# Patient Record
Sex: MALE | Race: WHITE | NOT HISPANIC OR LATINO | Employment: OTHER | URBAN - METROPOLITAN AREA
[De-identification: names, ages, dates, MRNs, and addresses within clinical notes are randomized per-mention and may not be internally consistent; named-entity substitution may affect disease eponyms.]

---

## 2018-05-18 LAB — CARCINOEMBRYONIC ANTIGEN (HISTORICAL): 3.2

## 2018-06-18 LAB
ABSOL LYMPHOCYTES (HISTORICAL): 0.7 K/UL (ref 0.5–4)
ANION GAP SERPL CALCULATED.3IONS-SCNC: 9 MMOL/L (ref 5–14)
BASOPHILS # BLD AUTO: 0 K/UL (ref 0–0.1)
BASOPHILS # BLD AUTO: 1 % (ref 0–1)
BUN SERPL-MCNC: 20 MG/DL (ref 5–25)
CALCIUM SERPL-MCNC: 8.6 MG/DL (ref 8.4–10.2)
CHLORIDE SERPL-SCNC: 101 MEQ/L (ref 97–108)
CO2 SERPL-SCNC: 27 MMOL/L (ref 22–30)
CREATINE, SERUM (HISTORICAL): 1 MG/DL (ref 0.7–1.5)
DEPRECATED RDW RBC AUTO: 12.9 %
EGFR (HISTORICAL): >60 ML/MIN/1.73 M2
EOSINOPHIL # BLD AUTO: 0.2 K/UL (ref 0–0.4)
EOSINOPHIL NFR BLD AUTO: 3 % (ref 0–6)
GLUCOSE SERPL-MCNC: 109 MG/DL (ref 70–99)
HCT VFR BLD AUTO: 40.6 % (ref 41–53)
HGB BLD-MCNC: 13.6 G/DL (ref 13.5–17.5)
LYMPHOCYTES NFR BLD AUTO: 11 % (ref 25–45)
MCH RBC QN AUTO: 32.5 PG (ref 26–34)
MCHC RBC AUTO-ENTMCNC: 33.4 % (ref 31–36)
MCV RBC AUTO: 97 FL (ref 80–100)
MONOCYTES # BLD AUTO: 0.8 K/UL (ref 0.2–0.9)
MONOCYTES NFR BLD AUTO: 12 % (ref 1–10)
NEUTROPHILS ABS COUNT (HISTORICAL): 4.8 K/UL (ref 1.8–7.8)
NEUTS SEG NFR BLD AUTO: 73 % (ref 45–65)
PLATELET # BLD AUTO: 182 K/MCL (ref 150–450)
POTASSIUM SERPL-SCNC: 4 MEQ/L (ref 3.6–5)
RBC # BLD AUTO: 4.18 M/MCL (ref 4.5–5.9)
SODIUM SERPL-SCNC: 137 MEQ/L (ref 137–147)
WBC # BLD AUTO: 6.5 K/MCL (ref 4.5–11)

## 2018-08-24 ENCOUNTER — TELEPHONE (OUTPATIENT)
Dept: FAMILY MEDICINE CLINIC | Facility: CLINIC | Age: 83
End: 2018-08-24

## 2018-08-24 NOTE — TELEPHONE ENCOUNTER
Patient called he would like to know if he had the T dap done he can be reached at [facility]

## 2018-08-26 ENCOUNTER — APPOINTMENT (EMERGENCY)
Dept: CT IMAGING | Facility: HOSPITAL | Age: 83
End: 2018-08-26
Payer: MEDICARE

## 2018-08-26 ENCOUNTER — OFFICE VISIT (OUTPATIENT)
Dept: URGENT CARE | Age: 83
End: 2018-08-26
Payer: MEDICARE

## 2018-08-26 ENCOUNTER — HOSPITAL ENCOUNTER (EMERGENCY)
Facility: HOSPITAL | Age: 83
Discharge: HOME | End: 2018-08-26
Attending: EMERGENCY MEDICINE | Admitting: EMERGENCY MEDICINE
Payer: MEDICARE

## 2018-08-26 VITALS
SYSTOLIC BLOOD PRESSURE: 190 MMHG | OXYGEN SATURATION: 97 % | DIASTOLIC BLOOD PRESSURE: 90 MMHG | TEMPERATURE: 97.9 F | WEIGHT: 167 LBS | HEIGHT: 70 IN | HEART RATE: 60 BPM | BODY MASS INDEX: 23.91 KG/M2 | RESPIRATION RATE: 16 BRPM

## 2018-08-26 VITALS
BODY MASS INDEX: 23.82 KG/M2 | WEIGHT: 166 LBS | DIASTOLIC BLOOD PRESSURE: 103 MMHG | HEART RATE: 53 BPM | TEMPERATURE: 98.1 F | RESPIRATION RATE: 18 BRPM | SYSTOLIC BLOOD PRESSURE: 195 MMHG | OXYGEN SATURATION: 98 %

## 2018-08-26 DIAGNOSIS — I16.0 HYPERTENSIVE URGENCY: Primary | ICD-10-CM

## 2018-08-26 DIAGNOSIS — I10 HYPERTENSION, UNSPECIFIED TYPE: ICD-10-CM

## 2018-08-26 LAB
ALBUMIN SERPL BCP-MCNC: 4 G/DL (ref 3–5.2)
ALP SERPL-CCNC: 90 U/L (ref 43–122)
ALT SERPL W P-5'-P-CCNC: 42 U/L (ref 9–52)
ANION GAP SERPL CALCULATED.3IONS-SCNC: 8 MMOL/L (ref 5–14)
APTT PPP: 26 SECONDS (ref 23–34)
AST SERPL W P-5'-P-CCNC: 35 U/L (ref 17–59)
BILIRUB SERPL-MCNC: 0.6 MG/DL
BILIRUB UR QL STRIP: NEGATIVE
BUN SERPL-MCNC: 21 MG/DL (ref 5–25)
CALCIUM SERPL-MCNC: 8.8 MG/DL (ref 8.4–10.2)
CHLORIDE SERPL-SCNC: 102 MMOL/L (ref 97–108)
CLARITY UR: CLEAR
CO2 SERPL-SCNC: 29 MMOL/L (ref 22–30)
COLOR UR: NORMAL
CREAT SERPL-MCNC: 0.88 MG/DL (ref 0.7–1.5)
EOSINOPHIL # BLD AUTO: 0.42 THOUSAND/UL (ref 0–0.4)
EOSINOPHIL NFR BLD MANUAL: 7 % (ref 0–6)
ERYTHROCYTE [DISTWIDTH] IN BLOOD BY AUTOMATED COUNT: 12.9 %
GFR SERPL CREATININE-BSD FRML MDRD: 78 ML/MIN/1.73SQ M
GLUCOSE SERPL-MCNC: 96 MG/DL (ref 70–99)
GLUCOSE UR STRIP-MCNC: NEGATIVE MG/DL
HCT VFR BLD AUTO: 40.8 % (ref 41–53)
HGB BLD-MCNC: 13.9 G/DL (ref 13.5–17.5)
HGB UR QL STRIP.AUTO: NEGATIVE
INR PPP: 1.06 (ref 0.89–1.1)
KETONES UR STRIP-MCNC: NEGATIVE MG/DL
LEUKOCYTE ESTERASE UR QL STRIP: NEGATIVE
LYMPHOCYTES # BLD AUTO: 1.14 THOUSAND/UL (ref 0.5–4)
LYMPHOCYTES # BLD AUTO: 19 % (ref 20–50)
MCH RBC QN AUTO: 32.6 PG (ref 26.8–34.3)
MCHC RBC AUTO-ENTMCNC: 34.2 G/DL (ref 31.4–37.4)
MCV RBC AUTO: 95 FL (ref 80–100)
MONOCYTES # BLD AUTO: 0.6 THOUSAND/UL (ref 0.2–0.9)
MONOCYTES NFR BLD AUTO: 10 % (ref 1–10)
NEUTS BAND NFR BLD MANUAL: 3 % (ref 0–8)
NEUTS SEG # BLD: 3.84 THOUSAND/UL (ref 1.8–7.8)
NEUTS SEG NFR BLD AUTO: 61 %
NITRITE UR QL STRIP: NEGATIVE
PH UR STRIP.AUTO: 5 [PH] (ref 4.5–8)
PLATELET # BLD AUTO: 178 THOUSANDS/UL (ref 150–450)
PLATELET BLD QL SMEAR: ADEQUATE
PMV BLD AUTO: 8.1 FL (ref 8.9–12.7)
POTASSIUM SERPL-SCNC: 4.2 MMOL/L (ref 3.6–5)
PROT SERPL-MCNC: 6.9 G/DL (ref 5.9–8.4)
PROT UR STRIP-MCNC: NEGATIVE MG/DL
PROTHROMBIN TIME: 11.2 SECONDS (ref 9.5–11.6)
RBC # BLD AUTO: 4.28 MILLION/UL (ref 4.5–5.9)
RBC MORPH BLD: NORMAL
SODIUM SERPL-SCNC: 139 MMOL/L (ref 137–147)
SP GR UR STRIP.AUTO: 1.01 (ref 1–1.04)
TOTAL CELLS COUNTED SPEC: 100
TROPONIN I SERPL-MCNC: <0.01 NG/ML (ref 0–0.03)
UROBILINOGEN UA: NEGATIVE MG/DL
WBC # BLD AUTO: 6 THOUSAND/UL (ref 4.31–10.16)

## 2018-08-26 PROCEDURE — 80053 COMPREHEN METABOLIC PANEL: CPT | Performed by: EMERGENCY MEDICINE

## 2018-08-26 PROCEDURE — 85610 PROTHROMBIN TIME: CPT | Performed by: EMERGENCY MEDICINE

## 2018-08-26 PROCEDURE — 85027 COMPLETE CBC AUTOMATED: CPT | Performed by: EMERGENCY MEDICINE

## 2018-08-26 PROCEDURE — 93005 ELECTROCARDIOGRAM TRACING: CPT

## 2018-08-26 PROCEDURE — 99213 OFFICE O/P EST LOW 20 MIN: CPT | Performed by: PHYSICIAN ASSISTANT

## 2018-08-26 PROCEDURE — 36415 COLL VENOUS BLD VENIPUNCTURE: CPT | Performed by: EMERGENCY MEDICINE

## 2018-08-26 PROCEDURE — 85730 THROMBOPLASTIN TIME PARTIAL: CPT | Performed by: EMERGENCY MEDICINE

## 2018-08-26 PROCEDURE — 70450 CT HEAD/BRAIN W/O DYE: CPT

## 2018-08-26 PROCEDURE — 84484 ASSAY OF TROPONIN QUANT: CPT | Performed by: EMERGENCY MEDICINE

## 2018-08-26 PROCEDURE — 85007 BL SMEAR W/DIFF WBC COUNT: CPT | Performed by: EMERGENCY MEDICINE

## 2018-08-26 PROCEDURE — 99284 EMERGENCY DEPT VISIT MOD MDM: CPT

## 2018-08-26 PROCEDURE — G0463 HOSPITAL OUTPT CLINIC VISIT: HCPCS | Performed by: PHYSICIAN ASSISTANT

## 2018-08-26 RX ORDER — METOPROLOL TARTRATE 50 MG/1
TABLET, FILM COATED ORAL
Status: COMPLETED
Start: 2018-08-26 | End: 2018-08-26

## 2018-08-26 RX ORDER — SIMVASTATIN 20 MG
20 TABLET ORAL
COMMUNITY
End: 2019-01-16 | Stop reason: SDUPTHER

## 2018-08-26 RX ORDER — METOPROLOL TARTRATE 50 MG/1
50 TABLET, FILM COATED ORAL ONCE
Status: COMPLETED | OUTPATIENT
Start: 2018-08-26 | End: 2018-08-26

## 2018-08-26 RX ORDER — OMEPRAZOLE 20 MG/1
20 CAPSULE, DELAYED RELEASE ORAL DAILY
COMMUNITY
End: 2021-08-25

## 2018-08-26 RX ORDER — METOPROLOL TARTRATE 50 MG/1
25 TABLET, FILM COATED ORAL EVERY 12 HOURS SCHEDULED
COMMUNITY
End: 2019-06-21 | Stop reason: SDUPTHER

## 2018-08-26 RX ORDER — UREA 10 %
400 LOTION (ML) TOPICAL DAILY
COMMUNITY

## 2018-08-26 RX ORDER — LISINOPRIL AND HYDROCHLOROTHIAZIDE 20; 12.5 MG/1; MG/1
1 TABLET ORAL DAILY
COMMUNITY
End: 2018-10-25 | Stop reason: HOSPADM

## 2018-08-26 RX ORDER — ASPIRIN 81 MG/1
81 TABLET ORAL DAILY
COMMUNITY
End: 2018-08-27

## 2018-08-26 RX ADMIN — METOPROLOL TARTRATE 50 MG: 50 TABLET ORAL at 18:00

## 2018-08-26 RX ADMIN — METOPROLOL TARTRATE 50 MG: 50 TABLET, FILM COATED ORAL at 18:00

## 2018-08-26 NOTE — PROGRESS NOTES
[facility]        NAME: Ron Deleon is a 80 y o  male  : 1932    MRN: [id number]  DATE: 2018  TIME: 3:35 PM    Assessment and Plan   Hypertensive urgency [I16 0]  1  Hypertensive urgency  Transfer to other facility         Patient Instructions       Follow up with PCP in 3-5 days  Proceed to  ER if symptoms worsen  Chief Complaint     Chief Complaint   Patient presents with    Hypertension     felt ill at 0200; took bp at home over 200;  feels a little better but bp still high         History of Present Illness       80year-old male presents with high blood pressure problems  Patient reports this morning around 1 or 2 o'clock in the morning he was awoke in and was not feeling that well at time  Went to use the restroom like he usually does and felt like he was off and having trouble with his balance  Patient thought that he could just sleep it off so he went back to bed and woke up this morning still feeling off thinking was kind of foggy and mild headache  Took his blood pressure realize it was the 200s/100s  He took his morning medications and then was tracking it through the course of the day  Blood pressures have not got below 170 is  Patient presents in the clinic still feeling very foggy the type of thought process and a mild headache  No blurry vision double vision  No ringing of the ears or decreased hearing  No nausea vomiting  No dizziness  No chest pain shortness of breath  Hypertension   This is a chronic problem  The current episode started today  The problem has been waxing and waning since onset  The problem is uncontrolled  Associated symptoms include headaches  (Having trouble with his thoughts  Feels very foggy  ) There are no associated agents to hypertension  There are no known risk factors for coronary artery disease  Past treatments include ACE inhibitors and beta blockers  The current treatment provides mild improvement   There are no compliance problems  Review of Systems   Review of Systems   Constitutional: Negative  HENT: Negative  Eyes: Negative  Respiratory: Negative  Cardiovascular: Negative  Gastrointestinal: Negative  Genitourinary: Negative  Musculoskeletal: Negative  Skin: Negative  Neurological: Positive for headaches  Current Medications       Current Outpatient Prescriptions:     aspirin (ECOTRIN LOW STRENGTH) 81 mg EC tablet, Take 81 mg by mouth daily, Disp: , Rfl:     folic acid (FOLVITE) 462 MCG tablet, Take 400 mcg by mouth daily, Disp: , Rfl:     lisinopril-hydrochlorothiazide (PRINZIDE,ZESTORETIC) 20-12 5 MG per tablet, Take 1 tablet by mouth daily, Disp: , Rfl:     metoprolol tartrate (LOPRESSOR) 50 mg tablet, Take 25 mg by mouth every 12 (twelve) hours, Disp: , Rfl:     omeprazole (PriLOSEC) 20 mg delayed release capsule, Take 20 mg by mouth daily, Disp: , Rfl:     simvastatin (ZOCOR) 20 mg tablet, Take 20 mg by mouth daily at bedtime, Disp: , Rfl:     Current Allergies     Allergies as of 08/26/2018    (No Known Allergies)            The following portions of the patient's history were reviewed and updated as appropriate: allergies, current medications, past family history, past medical history, past social history, past surgical history and problem list      Past Medical History:   Diagnosis Date    Anemia, iron deficiency     Drug Therapy, poor absorbtion documented    Appendicitis     Colon cancer ([facility] [address] ) 12/2013    Resected: BREANNA    Congenital nystagmus     Left    Hypertension     Inguinal hernia     Sciatica 2010    resolved from problem list-2011       Past Surgical History:   Procedure Laterality Date    APPENDECTOMY      Appendicitis    COLON SIGMOID RESECTION  12/2013    INGUINAL HERNIA REPAIR      SHOULDER ARTHROCENTESIS Right     sub-acromial bursa area       No family history on file  Medications have been verified          Objective   BP (!) 190/90 Comment: manual left arm  Pulse 60   Temp 97 9 °F (36 6 °C)   Resp 16   Ht 5' 10" (1 778 m)   Wt 75 8 kg (167 lb)   SpO2 97%   BMI 23 96 kg/m²        Physical Exam     Physical Exam   Constitutional: He is oriented to person, place, and time  He appears well-developed and well-nourished  No distress  HENT:   Head: Normocephalic and atraumatic  Right Ear: External ear normal    Left Ear: External ear normal    Nose: Nose normal    Mouth/Throat: Oropharynx is clear and moist  No oropharyngeal exudate  Eyes: Conjunctivae are normal  Right eye exhibits no discharge  Left eye exhibits no discharge  Neck: Normal range of motion  Neck supple  Cardiovascular: Normal rate, regular rhythm, normal heart sounds and intact distal pulses  No murmur heard  Pulmonary/Chest: Effort normal and breath sounds normal  No respiratory distress  He has no wheezes  He has no rales  Abdominal: Soft  Bowel sounds are normal  There is no tenderness  Musculoskeletal: Normal range of motion  Lymphadenopathy:     He has no cervical adenopathy  Neurological: He is alert and oriented to person, place, and time  He has normal reflexes  He displays normal reflexes  No cranial nerve deficit  He exhibits normal muscle tone  Coordination normal    Skin: Skin is warm and dry  Psychiatric: He has a normal mood and affect  Nursing note and vitals reviewed

## 2018-08-26 NOTE — PATIENT INSTRUCTIONS
Go straight to the ER for evaluation    Hypertensive Crisis   WHAT YOU NEED TO KNOW:   A hypertensive crisis is a sudden spike in blood pressure to 180/120 or higher  It is also known as acute hypertension  A hypertensive crisis is a medical emergency  It could lead to organ damage or be life-threatening  DISCHARGE INSTRUCTIONS:   Medicines: The following medicines may be ordered for you:  · Blood pressure medicine  is given to lower your blood pressure  There are many different types of blood pressure medicine, and you may need more than one type  · Diuretics  help decrease extra fluid that collects in your blood vessels  This lowers your blood pressure by reducing pressure in your arteries  Diuretics are often called water pills  You may urinate more often while you take this medicine  · Take your medicine as directed  Contact your healthcare provider if you think your medicine is not helping or if you have side effects  Tell him of her if you are allergic to any medicine  Keep a list of the medicines, vitamins, and herbs you take  Include the amounts, and when and why you take them  Bring the list or the pill bottles to follow-up visits  Carry your medicine list with you in case of an emergency  Follow up with your healthcare provider within 1 to 5 days or as directed: You will need to return to have your blood pressure checked and other tests  Your healthcare provider may also refer to you a cardiologist  Write down your questions so you remember to ask them during your visits  Monitor your blood pressure at home:  Take your blood pressure while in a seated position  Take it at least twice a day, such as morning and evening  Keep a log of your blood pressure readings and bring it to your follow-up visits  Help prevent another hypertensive crisis:   · Manage other health conditions  such as diabetes, thyroid disease, or adrenal problems  These conditions can cause or worsen a hypertensive crisis  · Eat a variety of healthy foods  including fruits, vegetables, whole-grain breads, low-fat dairy products, beans, lean meats, and fish  You will need to limit how much sodium and fat you eat  You also may need to eat more potassium  Ask if you need to be on a special diet  Changes to your diet will help lower your blood pressure  · Ask if you are at a healthy weight  Ask your healthcare provider to help you create a weight loss plan if you are overweight  Even a little weight loss can help lower your blood pressure  · Ask your healthcare provider about the best exercise plan for you  Exercise may help lower your blood pressure  · Limit alcohol  to 1 drink a day if you are a woman  A man should limit alcohol to 2 drinks a day  A drink of alcohol is 12 ounces of beer, 5 ounces of wine, or 1½ ounces of liquor  · Do not smoke  Smoking causes heart disease and may also raise your blood pressure  If you smoke, it is never too late to quit  Ask your healthcare provider for information if you need help to stop smoking  Contact your healthcare provider if:   · You run out of medicine  · You have questions or concerns about your condition or care  Return to the emergency department if:   · You take your blood pressure and it is 180/110 or higher  · You have a severe headache  · You have chest pain or shortness of breath  · You have weakness or numbness in your face, arms, or legs  · You cannot see or talk as well as usual   © 2017 [address] is for End User's use only and may not be sold, redistributed or otherwise used for commercial purposes  All illustrations and images included in CareNotes® are the copyrighted property of [employer]  or [employer]  The above information is an  only  It is not intended as medical advice for individual conditions or treatments   Talk to your doctor, nurse or pharmacist before following any medical regimen to see if it is safe and effective for you

## 2018-08-26 NOTE — ED PROVIDER NOTES
History  Chief Complaint   Patient presents with    High Blood Pressure     "I woke up this morning I was dizzy so I thought my BP was high and it was  On my machine at home, it came up 237/115  I did take my meds today so I don't know why they're not working all of the sudden"  Seen at [facility] and sent her for eval     At 2 in the morning as he ambulated to go to the bathroom, he felt very lightheaded  Denies any visual disturbances nausea vomiting headaches  States he took his blood pressure was markedly elevated systolic was however but 816 diastolic about 399  He is compliant with his antihypertensive medications  States he has not had any recent changes in his antihypertensive medications  States visit cardiologist with initially started him on his antihypertensive medications  But he is followed regular by his cardiologist and his family practice doctor  At this time he is asymptomatic  He also recorded his blood pressure is an 88 come down to systolic about 705 diastolic roughly between   History provided by:  Patient (Daughter at the bedside who lives in [place])   used: No    Dizziness   Quality:  Lightheadedness  Severity:  Moderate      Prior to Admission Medications   Prescriptions Last Dose Informant Patient Reported?  Taking?   folic acid (FOLVITE) 530 MCG tablet   Yes Yes   Sig: Take 400 mcg by mouth daily   lisinopril-hydrochlorothiazide (PRINZIDE,ZESTORETIC) 20-12 5 MG per tablet   Yes Yes   Sig: Take 1 tablet by mouth daily   metoprolol tartrate (LOPRESSOR) 50 mg tablet   Yes Yes   Sig: Take 25 mg by mouth every 12 (twelve) hours   omeprazole (PriLOSEC) 20 mg delayed release capsule   Yes Yes   Sig: Take 20 mg by mouth daily   simvastatin (ZOCOR) 20 mg tablet   Yes Yes   Sig: Take 20 mg by mouth daily at bedtime      Facility-Administered Medications: None       Past Medical History:   Diagnosis Date    Anemia, iron deficiency     Drug Therapy, poor absorbtion documented    Appendicitis     Colon cancer ([facility] [address] ) 12/2013    Resected: BREANNA    Congenital nystagmus     Left    Hypertension     Inguinal hernia     Sciatica 2010    resolved from problem list-2011       Past Surgical History:   Procedure Laterality Date    APPENDECTOMY      Appendicitis    COLON SIGMOID RESECTION  12/2013    INGUINAL HERNIA REPAIR      SHOULDER ARTHROCENTESIS Right     sub-acromial bursa area       History reviewed  No pertinent family history  I have reviewed and agree with the history as documented  Social History   Substance Use Topics    Smoking status: Never Smoker    Smokeless tobacco: Never Used    Alcohol use Not on file        Review of Systems   Constitutional: Negative  HENT: Negative  Eyes: Negative  Respiratory: Negative  Cardiovascular: Negative  Gastrointestinal: Negative  Endocrine: Negative  Genitourinary: Negative  Musculoskeletal: Negative  Skin: Negative  Allergic/Immunologic: Negative  Neurological: Positive for dizziness  Hematological: Negative  Psychiatric/Behavioral: Negative  All other systems reviewed and are negative  Physical Exam  Physical Exam   Constitutional: He is oriented to person, place, and time  He appears well-developed and well-nourished  No distress  Smiling very pleasant individual resting very comfortably on the stretcher laughing very easily   HENT:   Head: Normocephalic and atraumatic  Right Ear: External ear normal    Left Ear: External ear normal    Nose: Nose normal    Mouth/Throat: Oropharynx is clear and moist    Eyes: Conjunctivae and EOM are normal  Pupils are equal, round, and reactive to light  Neck: Normal range of motion  Neck supple  No JVD present  No tracheal deviation present  No thyromegaly present  Cardiovascular: Normal rate, regular rhythm, normal heart sounds and intact distal pulses  Exam reveals no gallop and no friction rub      No murmur heard   Pulmonary/Chest: Effort normal and breath sounds normal  No stridor  No respiratory distress  He has no wheezes  He has no rales  He exhibits no tenderness  Abdominal: Soft  Bowel sounds are normal  He exhibits no distension and no mass  There is no tenderness  There is no rebound and no guarding  No hernia  Genitourinary: Rectal exam shows guaiac negative stool  Musculoskeletal: Normal range of motion  He exhibits no edema, tenderness or deformity  Lymphadenopathy:     He has no cervical adenopathy  Neurological: He is alert and oriented to person, place, and time  He displays normal reflexes  No cranial nerve deficit or sensory deficit  He exhibits normal muscle tone  Coordination normal    Skin: Skin is warm and dry  Capillary refill takes less than 2 seconds  No rash noted  He is not diaphoretic  No erythema  No pallor  Psychiatric: He has a normal mood and affect  His behavior is normal  Judgment and thought content normal    Nursing note and vitals reviewed        Vital Signs  ED Triage Vitals   Temperature Pulse Respirations Blood Pressure SpO2   08/26/18 1546 08/26/18 1546 08/26/18 1546 08/26/18 1546 08/26/18 1546   98 1 °F (36 7 °C) 60 18 (!) 197/99 97 %      Temp src Heart Rate Source Patient Position - Orthostatic VS BP Location FiO2 (%)   -- 08/26/18 1733 08/26/18 1546 08/26/18 1546 --    Monitor Sitting Right arm       Pain Score       --                  Vitals:    08/26/18 1546 08/26/18 1733 08/26/18 1923   BP: (!) 197/99 (!) 192/102 (!) 195/103   Pulse: 60 (!) 54 (!) 53   Patient Position - Orthostatic VS: Sitting  Sitting       Visual Acuity      ED Medications  Medications   metoprolol tartrate (LOPRESSOR) tablet 50 mg (50 mg Oral Given 8/26/18 1800)       Diagnostic Studies  Results Reviewed     Procedure Component Value Units Date/Time    UA w Reflex to Microscopic [[id number]]  (Normal) Collected:  08/26/18 1840    Lab Status:  Final result Specimen:  Urine from Urine, Clean Catch Updated:  08/26/18 1900     Color, UA Straw     Clarity, UA Clear     Specific Gravity, UA 1 010     pH, UA 5 0     Leukocytes, UA Negative     Nitrite, UA Negative     Protein, UA Negative mg/dl      Glucose, UA Negative mg/dl      Ketones, UA Negative mg/dl      Bilirubin, UA Negative     Blood, UA Negative     UROBILINOGEN UA Negative mg/dL     APTT [[id number]]  (Normal) Collected:  08/26/18 1636    Lab Status:  Final result Specimen:  Blood from Arm, Right Updated:  08/26/18 1719     PTT 26 seconds     Narrative:       PTT:      Protime-INR [[id number]]  (Normal) Collected:  08/26/18 1636    Lab Status:  Final result Specimen:  Blood from Arm, Right Updated:  08/26/18 1719     Protime 11 2 seconds      INR 1 06    Narrative:       INR:  ,PROTIME:      Troponin I [[id number]]  (Normal) Collected:  08/26/18 1636    Lab Status:  Final result Specimen:  Blood from Arm, Right Updated:  08/26/18 1719     Troponin I <0 01 ng/mL     Comprehensive metabolic panel [[id number]]  (Normal) Collected:  08/26/18 1636    Lab Status:  Final result Specimen:  Blood from Arm, Right Updated:  08/26/18 1657     Sodium 139 mmol/L      Potassium 4 2 mmol/L      Chloride 102 mmol/L      CO2 29 mmol/L      ANION GAP 8 mmol/L      BUN 21 mg/dL      Creatinine 0 88 mg/dL      Glucose 96 mg/dL      Calcium 8 8 mg/dL      AST 35 U/L      ALT 42 U/L      Alkaline Phosphatase 90 U/L      Total Protein 6 9 g/dL      Albumin 4 0 g/dL      Total Bilirubin 0 60 mg/dL      eGFR 78 ml/min/1 73sq m     Narrative:         National Kidney Disease Education Program recommendations are as follows:  GFR calculation is accurate only with a steady state creatinine  Chronic Kidney disease less than 60 ml/min/1 73 sq  meters  Kidney failure less than 15 ml/min/1 73 sq  meters      CBC and differential [[id number]]  (Abnormal) Collected:  08/26/18 1636    Lab Status:  Final result Specimen:  Blood from Arm, Right Updated:  08/26/18 1650     WBC 6 00 Thousand/uL RBC 4 28 (L) Million/uL      Hemoglobin 13 9 g/dL      Hematocrit 40 8 (L) %      MCV 95 fL      MCH 32 6 pg      MCHC 34 2 g/dL      RDW 12 9 %      MPV 8 1 (L) fL      Platelets 640 Thousands/uL                  CT head without contrast   Final Result by Susu Faust MD (08/26 1802)      No acute intracranial abnormality  Microangiopathic changes  Workstation performed: [id number]                   EKG:   Regular sinus rhythm at 60 per no acute ischemia noted    Procedures  Procedures       Phone Contacts  ED Phone Contact    ED Course  ED Course as of Aug 27 1900   Davidyudichaya Roth Aug 26, 2018   1738 CT head without contrast   1911 Patient resting very comfortably  Discussed results with the patient and patient's daughter was still a bedside  Discharge the patient home and follow up with his family doctor tomorrow morning for repeat blood pressure check any possible blood pressure medication adjustments  Both the patient and his daughter comfortable with this plan  Final diagnosis hypertensive urgency                                MDM  Number of Diagnoses or Management Options  Hypertension, unspecified type:   Hypertensive urgency:      Amount and/or Complexity of Data Reviewed  Clinical lab tests: ordered and reviewed  Tests in the radiology section of CPT®: ordered and reviewed    Patient Progress  Patient progress: stable    CritCare Time    Disposition  Final diagnoses:   Hypertensive urgency   Hypertension, unspecified type     Time reflects when diagnosis was documented in both MDM as applicable and the Disposition within this note     Time User Action Codes Description Comment    8/26/2018  7:14 PM Sylwia Harvey Add [I16 0] Hypertensive urgency     8/26/2018  7:14 PM Sylwia Harvey Add [I10] Hypertension, unspecified type       ED Disposition     ED Disposition Condition Comment    Discharge  Kathyleen Jehovah's witness discharge to home/self care      Condition at discharge: Stable        Follow-up Information     Follow up With Specialties Details Why [facility] In 1 day As discussed follow-up with the primary care doctor for possible blood pressure medication adjustment [address]  [phone]            Discharge Medication List as of 8/26/2018  7:15 PM      CONTINUE these medications which have NOT CHANGED    Details   folic acid (FOLVITE) 716 MCG tablet Take 400 mcg by mouth daily, Historical Med      lisinopril-hydrochlorothiazide (PRINZIDE,ZESTORETIC) 20-12 5 MG per tablet Take 1 tablet by mouth daily, Historical Med      metoprolol tartrate (LOPRESSOR) 50 mg tablet Take 25 mg by mouth every 12 (twelve) hours, Historical Med      omeprazole (PriLOSEC) 20 mg delayed release capsule Take 20 mg by mouth daily, Historical Med      simvastatin (ZOCOR) 20 mg tablet Take 20 mg by mouth daily at bedtime, Historical Med      aspirin (ECOTRIN LOW STRENGTH) 81 mg EC tablet Take 81 mg by mouth daily, Historical Med           No discharge procedures on file      ED Provider  Electronically Signed by           Kelly Cook MD  08/27/18 1980

## 2018-08-26 NOTE — DISCHARGE INSTRUCTIONS

## 2018-08-27 ENCOUNTER — TRANSITIONAL CARE MANAGEMENT (OUTPATIENT)
Dept: FAMILY MEDICINE CLINIC | Facility: CLINIC | Age: 83
End: 2018-08-27

## 2018-08-27 ENCOUNTER — OFFICE VISIT (OUTPATIENT)
Dept: FAMILY MEDICINE CLINIC | Facility: CLINIC | Age: 83
End: 2018-08-27
Payer: MEDICARE

## 2018-08-27 ENCOUNTER — TELEPHONE (OUTPATIENT)
Dept: FAMILY MEDICINE CLINIC | Facility: CLINIC | Age: 83
End: 2018-08-27

## 2018-08-27 VITALS
SYSTOLIC BLOOD PRESSURE: 142 MMHG | DIASTOLIC BLOOD PRESSURE: 78 MMHG | HEART RATE: 66 BPM | WEIGHT: 167.5 LBS | BODY MASS INDEX: 23.98 KG/M2 | HEIGHT: 70 IN

## 2018-08-27 DIAGNOSIS — I10 ESSENTIAL HYPERTENSION: Primary | ICD-10-CM

## 2018-08-27 LAB
ATRIAL RATE: 54 BPM
P AXIS: 51 DEGREES
PR INTERVAL: 168 MS
QRS AXIS: 4 DEGREES
QRSD INTERVAL: 88 MS
QT INTERVAL: 462 MS
QTC INTERVAL: 438 MS
T WAVE AXIS: 56 DEGREES
VENTRICULAR RATE: 54 BPM

## 2018-08-27 PROCEDURE — 99496 TRANSJ CARE MGMT HIGH F2F 7D: CPT | Performed by: NURSE PRACTITIONER

## 2018-08-27 PROCEDURE — 93010 ELECTROCARDIOGRAM REPORT: CPT | Performed by: INTERNAL MEDICINE

## 2018-08-27 RX ORDER — TRIAMCINOLONE ACETONIDE 1 MG/G
CREAM TOPICAL
COMMUNITY
Start: 2017-11-17 | End: 2018-10-24

## 2018-08-27 RX ORDER — ASPIRIN 81 MG/1
81 TABLET, CHEWABLE ORAL
COMMUNITY

## 2018-08-27 NOTE — PROGRESS NOTES
Assessment/Plan:    No problem-specific Assessment & Plan notes found for this encounter  Reviewed Signs and Symptoms of stroke and when to call 911  Diagnoses and all orders for this visit:    Essential hypertension  Comments:  Check BP twice daily and when not feeling well and keep record  If BP continues to be elevated in AM call Thursday for appointment  Increase fluid intake  Other orders  -     NITROGLYCERIN ER PO; place 1 tablet by sublingual route at the 1st sign of attack; may repeat every 5 min until relief; if pain persists after 3 tablets in 15 min, prompt medical attention is recommended          Subjective:   Chief Complaint   Patient presents with    Follow-up     Post Hospital/Hypertension        Patient ID: Lokesh Garcia is a 80 y o  male  Presents today with complaints of elevated blood pressure, feeling foggy, and headache  Was seen in the ED yesterday with elevated blood pressure  He was worked up with CT of the head, troponins, PTT and blood work  CT was negative as well as troponin and blood work  He states he woke this morning again with a headache and feeling foggy his blood pressure this morning was 160/98  Patient is compliant with taking his BP medication  He take metoprolol at 8 AM and PM and his Lisinopril/HCTZ at 8 am as well  Pressure in the office today is within normal limits  Discussed with patient medication at this time appears to be keeping his blood pressure at ranges consistent with his age, and adding any more would not be reasonable at this time  Discussed with patient's checking his blood pressure twice daily, if he is not feeling well, and if blood pressure continues to be elevated in the early morning hours to call on Thursday  Encourage patient to increase water intake, as he does not drink much during the day  Reviewed signs and symptoms of stroke and when to call 911          The following portions of the patient's history were reviewed and updated as appropriate: allergies, current medications, past family history, past medical history, past social history, past surgical history and problem list     Review of Systems   Constitutional: Negative for chills and fever  Eyes: Negative for visual disturbance  Respiratory: Negative for cough  Cardiovascular: Negative for chest pain, palpitations and leg swelling  Neurological: Positive for headaches  Psychiatric/Behavioral: Negative for dysphoric mood  The patient is not nervous/anxious  Objective:  Vitals:    08/27/18 1512 08/27/18 1540 08/27/18 1609   BP: 110/70 142/78 142/78   BP Location: Left arm     Patient Position: Sitting     Cuff Size: Standard     Pulse: 66     Weight: 76 kg (167 lb 8 oz)     Height: 5' 10" (1 778 m)        Physical Exam   Constitutional: He is oriented to person, place, and time  He appears well-developed and well-nourished  Cardiovascular: Normal rate, regular rhythm and normal heart sounds  Pulmonary/Chest: Effort normal and breath sounds normal    Neurological: He is alert and oriented to person, place, and time  Skin: Skin is warm and dry  Psychiatric: He has a normal mood and affect   His behavior is normal

## 2018-08-27 NOTE — PATIENT INSTRUCTIONS

## 2018-09-04 ENCOUNTER — OFFICE VISIT (OUTPATIENT)
Dept: FAMILY MEDICINE CLINIC | Facility: CLINIC | Age: 83
End: 2018-09-04
Payer: MEDICARE

## 2018-09-04 VITALS
DIASTOLIC BLOOD PRESSURE: 70 MMHG | HEIGHT: 70 IN | SYSTOLIC BLOOD PRESSURE: 110 MMHG | BODY MASS INDEX: 24.34 KG/M2 | RESPIRATION RATE: 18 BRPM | WEIGHT: 170 LBS

## 2018-09-04 DIAGNOSIS — I10 BENIGN ESSENTIAL HTN: Primary | ICD-10-CM

## 2018-09-04 PROCEDURE — 99213 OFFICE O/P EST LOW 20 MIN: CPT | Performed by: NURSE PRACTITIONER

## 2018-09-04 NOTE — PROGRESS NOTES
Assessment/Plan:    No problem-specific Assessment & Plan notes found for this encounter  Diagnoses and all orders for this visit:    Benign essential HTN  Comments:  Bring in home BP cuff for calibration  Subjective:   Chief Complaint   Patient presents with    Follow-up     Blood Pressure        Patient ID: Jeferson Rivera is a 80 y o  male  Presents today for 1 week follow-up of elevated blood pressures primarily in the early a m  Patricia Hidalgo Patient has been recording his blood pressures in the evening and in the morning with the highest systolic at 9:56 a m  And the highest diastolic at 499  He is no longer feeling the complaints of fogginess in his head  He is feeling well just very concerned about the elevated blood pressures  Discussed with patient bringing in his BP machine to make sure it is calibrated correctly  The following portions of the patient's history were reviewed and updated as appropriate: allergies, current medications, past family history, past medical history, past social history, past surgical history and problem list     Review of Systems   Constitutional: Negative for chills and fever  Respiratory: Negative for cough and shortness of breath  Cardiovascular: Negative for chest pain, palpitations and leg swelling  Neurological: Positive for headaches (mild tension)  Psychiatric/Behavioral: Negative for dysphoric mood  The patient is not nervous/anxious  Objective:  Vitals:    09/04/18 1435   BP: 110/70   BP Location: Left arm   Patient Position: Sitting   Cuff Size: Standard   Resp: 18   Weight: 77 1 kg (170 lb)   Height: 5' 10" (1 778 m)      Physical Exam   Constitutional: He is oriented to person, place, and time  He appears well-developed and well-nourished  Cardiovascular: Normal rate, regular rhythm and intact distal pulses      Pulmonary/Chest: Effort normal and breath sounds normal    Neurological: He is alert and oriented to person, place, and time    Skin: Skin is warm and dry  Psychiatric: He has a normal mood and affect   His behavior is normal

## 2018-09-11 ENCOUNTER — APPOINTMENT (OUTPATIENT)
Dept: LAB | Facility: HOSPITAL | Age: 83
End: 2018-09-11

## 2018-09-11 ENCOUNTER — TRANSCRIBE ORDERS (OUTPATIENT)
Dept: ADMINISTRATIVE | Facility: HOSPITAL | Age: 83
End: 2018-09-11

## 2018-09-11 DIAGNOSIS — Z01.84 IMMUNITY STATUS TESTING: ICD-10-CM

## 2018-09-11 DIAGNOSIS — Z11.1 SCREENING EXAMINATION FOR PULMONARY TUBERCULOSIS: ICD-10-CM

## 2018-09-11 DIAGNOSIS — Z01.84 IMMUNITY STATUS TESTING: Primary | ICD-10-CM

## 2018-09-11 PROCEDURE — 86480 TB TEST CELL IMMUN MEASURE: CPT

## 2018-09-11 PROCEDURE — 86735 MUMPS ANTIBODY: CPT

## 2018-09-11 PROCEDURE — 86765 RUBEOLA ANTIBODY: CPT

## 2018-09-11 PROCEDURE — 36415 COLL VENOUS BLD VENIPUNCTURE: CPT

## 2018-09-11 PROCEDURE — 86787 VARICELLA-ZOSTER ANTIBODY: CPT

## 2018-09-12 ENCOUNTER — CLINICAL SUPPORT (OUTPATIENT)
Dept: FAMILY MEDICINE CLINIC | Facility: CLINIC | Age: 83
End: 2018-09-12
Payer: MEDICARE

## 2018-09-12 DIAGNOSIS — Z23 IMMUNIZATION DUE: Primary | ICD-10-CM

## 2018-09-12 PROCEDURE — 90471 IMMUNIZATION ADMIN: CPT

## 2018-09-12 PROCEDURE — 90715 TDAP VACCINE 7 YRS/> IM: CPT

## 2018-09-13 LAB
ANNOTATION COMMENT IMP: NORMAL
GAMMA INTERFERON BACKGROUND BLD IA-ACNC: 0.03 IU/ML
M TB IFN-G BLD-IMP: NEGATIVE
M TB IFN-G CD4+ BCKGRND COR BLD-ACNC: 0 IU/ML
M TB IFN-G CD4+ T-CELLS BLD-ACNC: 0.03 IU/ML
MEV IGG SER QL: NORMAL
MITOGEN IGNF BLD-ACNC: 6.87 IU/ML
MUV IGG SER QL: NORMAL
QUANTIFERON-TB GOLD IN TUBE: NORMAL
SERVICE CMNT-IMP: NORMAL
VZV IGG SER IA-ACNC: NORMAL

## 2018-10-08 ENCOUNTER — OFFICE VISIT (OUTPATIENT)
Dept: FAMILY MEDICINE CLINIC | Facility: CLINIC | Age: 83
End: 2018-10-08
Payer: MEDICARE

## 2018-10-08 VITALS
TEMPERATURE: 95.5 F | SYSTOLIC BLOOD PRESSURE: 130 MMHG | HEART RATE: 58 BPM | HEIGHT: 70 IN | WEIGHT: 170.2 LBS | RESPIRATION RATE: 18 BRPM | BODY MASS INDEX: 24.37 KG/M2 | DIASTOLIC BLOOD PRESSURE: 90 MMHG

## 2018-10-08 DIAGNOSIS — I10 BENIGN ESSENTIAL HTN: Primary | ICD-10-CM

## 2018-10-08 PROCEDURE — 99212 OFFICE O/P EST SF 10 MIN: CPT | Performed by: NURSE PRACTITIONER

## 2018-10-08 NOTE — PROGRESS NOTES
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  Encouraged him continuing to get out and having that both mental and physical stimulation  He is scheduled for his annual physical with Dr Burt Owen in December  Patient was encouraged to check his pressure approximately 2 to 3 times a week and bring with him for his physical appointment  He stated that he is having some periods of anxiety related to both his and his wife's health  This often causes him to not be able to sleep well at night  Discussed trying some Tylenol p m  To help with sleeping  Also discussed other breathing techniques and visualization techniques to reduce anxiety  The following portions of the patient's history were reviewed and updated as appropriate: allergies, current medications, past family history, past medical history, past social history, past surgical history and problem list     Review of Systems   Constitutional: Negative for chills and fever  Respiratory: Negative for cough  Cardiovascular: Negative for chest pain, palpitations and leg swelling  Neurological: Negative for dizziness and headaches  Psychiatric/Behavioral: Negative for dysphoric mood  The patient is nervous/anxious (mild)            Objective:  Vitals:    10/08/18 0915   BP: 130/90   BP Location: Left arm   Patient Position: Sitting   Cuff Size: Standard   Pulse: 58   Resp: 18   Temp: (!) 95 5 °F (35 3 °C)   TempSrc: Oral   Weight: 77 2 kg (170 lb 3 2 oz)   Height: 5' 10" (1 778 m)      Physical Exam   Constitutional: He is oriented to person, place, and time  He appears well-developed and well-nourished  Neck: Normal range of motion  Neck supple  Cardiovascular: Normal rate, regular rhythm and intact distal pulses  Pulmonary/Chest: Effort normal and breath sounds normal    Musculoskeletal: He exhibits no edema  Lymphadenopathy:     He has no cervical adenopathy  Neurological: He is alert and oriented to person, place, and time  Skin: Skin is warm and dry  Psychiatric: He has a normal mood and affect  His behavior is normal          Assessment/Plan:    No problem-specific Assessment & Plan notes found for this encounter         Diagnoses and all orders for this visit:    Benign essential HTN  Comments:  Controlled

## 2018-10-24 ENCOUNTER — APPOINTMENT (EMERGENCY)
Dept: RADIOLOGY | Facility: HOSPITAL | Age: 83
DRG: 312 | End: 2018-10-24
Payer: MEDICARE

## 2018-10-24 ENCOUNTER — HOSPITAL ENCOUNTER (INPATIENT)
Facility: HOSPITAL | Age: 83
LOS: 1 days | Discharge: HOME | DRG: 312 | End: 2018-10-25
Attending: EMERGENCY MEDICINE | Admitting: FAMILY MEDICINE
Payer: MEDICARE

## 2018-10-24 ENCOUNTER — APPOINTMENT (EMERGENCY)
Dept: CT IMAGING | Facility: HOSPITAL | Age: 83
DRG: 312 | End: 2018-10-24
Payer: MEDICARE

## 2018-10-24 DIAGNOSIS — R27.8 DYSMETRIA: ICD-10-CM

## 2018-10-24 DIAGNOSIS — E87.6 HYPOKALEMIA: ICD-10-CM

## 2018-10-24 DIAGNOSIS — R42 DIZZINESS: ICD-10-CM

## 2018-10-24 DIAGNOSIS — R55 SYNCOPE: Primary | ICD-10-CM

## 2018-10-24 DIAGNOSIS — D72.829 LEUKOCYTOSIS: ICD-10-CM

## 2018-10-24 DIAGNOSIS — G45.9 TIA (TRANSIENT ISCHEMIC ATTACK): ICD-10-CM

## 2018-10-24 DIAGNOSIS — I10 BENIGN ESSENTIAL HTN: ICD-10-CM

## 2018-10-24 DIAGNOSIS — H55.00 NYSTAGMUS: ICD-10-CM

## 2018-10-24 LAB
ALBUMIN SERPL BCP-MCNC: 4.3 G/DL (ref 3–5.2)
ALP SERPL-CCNC: 103 U/L (ref 43–122)
ALT SERPL W P-5'-P-CCNC: 35 U/L (ref 9–52)
ANION GAP SERPL CALCULATED.3IONS-SCNC: 10 MMOL/L (ref 5–14)
APTT PPP: 25 SECONDS (ref 23–34)
AST SERPL W P-5'-P-CCNC: 34 U/L (ref 17–59)
ATRIAL RATE: 66 BPM
BASOPHILS # BLD AUTO: 0.2 THOUSANDS/ΜL (ref 0–0.1)
BASOPHILS NFR BLD AUTO: 1 % (ref 0–1)
BILIRUB SERPL-MCNC: 0.7 MG/DL
BUN SERPL-MCNC: 27 MG/DL (ref 5–25)
CALCIUM SERPL-MCNC: 8.8 MG/DL (ref 8.4–10.2)
CHLORIDE SERPL-SCNC: 104 MMOL/L (ref 97–108)
CO2 SERPL-SCNC: 26 MMOL/L (ref 22–30)
CREAT SERPL-MCNC: 0.98 MG/DL (ref 0.7–1.5)
EOSINOPHIL # BLD AUTO: 0.1 THOUSAND/ΜL (ref 0–0.4)
EOSINOPHIL NFR BLD AUTO: 1 % (ref 0–6)
ERYTHROCYTE [DISTWIDTH] IN BLOOD BY AUTOMATED COUNT: 12.6 %
GFR SERPL CREATININE-BSD FRML MDRD: 70 ML/MIN/1.73SQ M
GLUCOSE SERPL-MCNC: 112 MG/DL (ref 70–99)
GLUCOSE SERPL-MCNC: 158 MG/DL (ref 70–99)
GLUCOSE SERPL-MCNC: 160 MG/DL (ref 70–99)
HCT VFR BLD AUTO: 43.2 % (ref 41–53)
HGB BLD-MCNC: 14.6 G/DL (ref 13.5–17.5)
INR PPP: 1.05 (ref 0.89–1.1)
LIPASE SERPL-CCNC: 114 U/L (ref 23–300)
LYMPHOCYTES # BLD AUTO: 0.3 THOUSANDS/ΜL (ref 0.5–4)
LYMPHOCYTES NFR BLD AUTO: 3 % (ref 20–50)
MCH RBC QN AUTO: 32.6 PG (ref 26.8–34.3)
MCHC RBC AUTO-ENTMCNC: 33.7 G/DL (ref 31.4–37.4)
MCV RBC AUTO: 97 FL (ref 80–100)
MONOCYTES # BLD AUTO: 1 THOUSAND/ΜL (ref 0.2–0.9)
MONOCYTES NFR BLD AUTO: 7 % (ref 1–10)
NEUTROPHILS # BLD AUTO: 11.8 THOUSANDS/ΜL (ref 1.8–7.8)
NEUTS SEG NFR BLD AUTO: 88 % (ref 45–65)
P AXIS: 55 DEGREES
PLATELET # BLD AUTO: 167 THOUSANDS/UL (ref 150–450)
PMV BLD AUTO: 8.8 FL (ref 8.9–12.7)
POTASSIUM SERPL-SCNC: 4.1 MMOL/L (ref 3.6–5)
PR INTERVAL: 144 MS
PROT SERPL-MCNC: 6.8 G/DL (ref 5.9–8.4)
PROTHROMBIN TIME: 11.1 SECONDS (ref 9.5–11.6)
QRS AXIS: 0 DEGREES
QRSD INTERVAL: 116 MS
QT INTERVAL: 448 MS
QTC INTERVAL: 469 MS
RBC # BLD AUTO: 4.47 MILLION/UL (ref 4.5–5.9)
SODIUM SERPL-SCNC: 140 MMOL/L (ref 137–147)
T WAVE AXIS: 51 DEGREES
TROPONIN I SERPL-MCNC: <0.01 NG/ML (ref 0–0.03)
TROPONIN I SERPL-MCNC: <0.01 NG/ML (ref 0–0.03)
VENTRICULAR RATE: 66 BPM
WBC # BLD AUTO: 13.4 THOUSAND/UL (ref 4.31–10.16)

## 2018-10-24 PROCEDURE — 99223 1ST HOSP IP/OBS HIGH 75: CPT | Performed by: FAMILY MEDICINE

## 2018-10-24 PROCEDURE — 85610 PROTHROMBIN TIME: CPT | Performed by: PHYSICIAN ASSISTANT

## 2018-10-24 PROCEDURE — 99285 EMERGENCY DEPT VISIT HI MDM: CPT

## 2018-10-24 PROCEDURE — 85025 COMPLETE CBC W/AUTO DIFF WBC: CPT

## 2018-10-24 PROCEDURE — 93005 ELECTROCARDIOGRAM TRACING: CPT

## 2018-10-24 PROCEDURE — 84484 ASSAY OF TROPONIN QUANT: CPT | Performed by: PHYSICIAN ASSISTANT

## 2018-10-24 PROCEDURE — 90662 IIV NO PRSV INCREASED AG IM: CPT | Performed by: FAMILY MEDICINE

## 2018-10-24 PROCEDURE — 85730 THROMBOPLASTIN TIME PARTIAL: CPT | Performed by: PHYSICIAN ASSISTANT

## 2018-10-24 PROCEDURE — 80053 COMPREHEN METABOLIC PANEL: CPT

## 2018-10-24 PROCEDURE — 96360 HYDRATION IV INFUSION INIT: CPT

## 2018-10-24 PROCEDURE — G0008 ADMIN INFLUENZA VIRUS VAC: HCPCS | Performed by: FAMILY MEDICINE

## 2018-10-24 PROCEDURE — 84484 ASSAY OF TROPONIN QUANT: CPT

## 2018-10-24 PROCEDURE — 36415 COLL VENOUS BLD VENIPUNCTURE: CPT

## 2018-10-24 PROCEDURE — 71046 X-RAY EXAM CHEST 2 VIEWS: CPT

## 2018-10-24 PROCEDURE — 70496 CT ANGIOGRAPHY HEAD: CPT

## 2018-10-24 PROCEDURE — 93010 ELECTROCARDIOGRAM REPORT: CPT | Performed by: INTERNAL MEDICINE

## 2018-10-24 PROCEDURE — 70498 CT ANGIOGRAPHY NECK: CPT

## 2018-10-24 PROCEDURE — 83690 ASSAY OF LIPASE: CPT | Performed by: FAMILY MEDICINE

## 2018-10-24 PROCEDURE — 82948 REAGENT STRIP/BLOOD GLUCOSE: CPT

## 2018-10-24 RX ORDER — ATORVASTATIN CALCIUM 40 MG/1
40 TABLET, FILM COATED ORAL EVERY EVENING
Status: DISCONTINUED | OUTPATIENT
Start: 2018-10-24 | End: 2018-10-25 | Stop reason: HOSPADM

## 2018-10-24 RX ORDER — SODIUM CHLORIDE 9 MG/ML
125 INJECTION, SOLUTION INTRAVENOUS CONTINUOUS
Status: DISCONTINUED | OUTPATIENT
Start: 2018-10-24 | End: 2018-10-25

## 2018-10-24 RX ORDER — ASPIRIN 81 MG/1
TABLET, CHEWABLE ORAL
Status: COMPLETED
Start: 2018-10-24 | End: 2018-10-24

## 2018-10-24 RX ORDER — PRAVASTATIN SODIUM 20 MG
20 TABLET ORAL
Status: CANCELLED | OUTPATIENT
Start: 2018-10-24

## 2018-10-24 RX ORDER — ASPIRIN 81 MG/1
81 TABLET, CHEWABLE ORAL DAILY
Status: CANCELLED | OUTPATIENT
Start: 2018-10-24

## 2018-10-24 RX ORDER — ACETAMINOPHEN 325 MG/1
650 TABLET ORAL EVERY 6 HOURS PRN
Status: DISCONTINUED | OUTPATIENT
Start: 2018-10-24 | End: 2018-10-25 | Stop reason: HOSPADM

## 2018-10-24 RX ORDER — ASPIRIN 81 MG/1
81 TABLET, CHEWABLE ORAL DAILY
Status: DISCONTINUED | OUTPATIENT
Start: 2018-10-25 | End: 2018-10-25 | Stop reason: HOSPADM

## 2018-10-24 RX ORDER — ASPIRIN 81 MG/1
324 TABLET, CHEWABLE ORAL ONCE
Status: COMPLETED | OUTPATIENT
Start: 2018-10-24 | End: 2018-10-24

## 2018-10-24 RX ORDER — ONDANSETRON 2 MG/ML
4 INJECTION INTRAMUSCULAR; INTRAVENOUS EVERY 6 HOURS PRN
Status: DISCONTINUED | OUTPATIENT
Start: 2018-10-24 | End: 2018-10-25 | Stop reason: HOSPADM

## 2018-10-24 RX ORDER — FOLIC ACID 1 MG/1
500 TABLET ORAL DAILY
Status: DISCONTINUED | OUTPATIENT
Start: 2018-10-25 | End: 2018-10-25 | Stop reason: HOSPADM

## 2018-10-24 RX ADMIN — METOPROLOL TARTRATE 25 MG: 25 TABLET, FILM COATED ORAL at 21:08

## 2018-10-24 RX ADMIN — ASPIRIN 81 MG 324 MG: 81 TABLET ORAL at 14:17

## 2018-10-24 RX ADMIN — SODIUM CHLORIDE 125 ML/HR: 9 INJECTION, SOLUTION INTRAVENOUS at 17:40

## 2018-10-24 RX ADMIN — ASPIRIN 324 MG: 81 TABLET, CHEWABLE ORAL at 14:17

## 2018-10-24 RX ADMIN — IOHEXOL 100 ML: 350 INJECTION, SOLUTION INTRAVENOUS at 11:52

## 2018-10-24 RX ADMIN — ATORVASTATIN CALCIUM 40 MG: 40 TABLET, FILM COATED ORAL at 17:40

## 2018-10-24 RX ADMIN — INFLUENZA A VIRUS A/MICHIGAN/45/2015 X-275 (H1N1) ANTIGEN (FORMALDEHYDE INACTIVATED), INFLUENZA A VIRUS A/SINGAPORE/INFIMH-16-0019/2016 IVR-186 (H3N2) ANTIGEN (FORMALDEHYDE INACTIVATED), AND INFLUENZA B VIRUS B/MARYLAND/15/2016 BX-69A (A B/COLORADO/6/2017-LIKE VIRUS) ANTIGEN (FORMALDEHYDE INACTIVATED) 0.5 ML: 60; 60; 60 INJECTION, SUSPENSION INTRAMUSCULAR at 19:08

## 2018-10-24 RX ADMIN — SODIUM CHLORIDE 500 ML: 9 INJECTION, SOLUTION INTRAVENOUS at 10:50

## 2018-10-24 NOTE — ED PROVIDER NOTES
History  Chief Complaint   Patient presents with    Syncope     pt reports having a syncopal episode from standing position this morning, denies head/neck/back pain, reports vomiting/diarrea since yesterday     Patient is 59-year-old male with history of hypertension, hyperlipidemia, cardiac disease, CABG x2, presents emergency department for evaluation of syncope and dizziness  Patient states he was walking around normal eating breakfast and getting dressed  He states that while his wife was waking up you sitting on a couch  He states he stood up and walked to the kitchen  He states that while he was standing in the kitchen after couple minutes he started to feel dizzy  He reached out for the handle of the freezer, which is lasting members  He then woke up on the kitchen floor  States he had emesis on his sure  He then woke up went to the bathroom and had 2-3 more episodes of vomiting  He states he had persistent dizziness  EMS was called  Upon arrival to emergency department all symptoms have resolved  Patient no longer having dizziness or nausea  Patient with no suspicious food intake  Patient was not feeling sick or any signs of gastritis prior to this episode  Prior to Admission Medications   Prescriptions Last Dose Informant Patient Reported? Taking?    Multiple Vitamins-Minerals (MULTIVITAL-M PO) Not Taking at Unknown time  Yes No   Sig: Take 1 tablet by mouth   NITROGLYCERIN ER PO Not Taking at Unknown time  Yes No   Sig: place 1 tablet by sublingual route at the 1st sign of attack; may repeat every 5 min until relief; if pain persists after 3 tablets in 15 min, prompt medical attention is recommended   aspirin 81 mg chewable tablet 10/24/2018 at Unknown time  Yes Yes   Sig: Chew 81 mg   folic acid (FOLVITE) 520 MCG tablet 10/23/2018 at Unknown time  Yes Yes   Sig: Take 400 mcg by mouth daily   lisinopril-hydrochlorothiazide (PRINZIDE,ZESTORETIC) 20-12 5 MG per tablet 10/23/2018 at Unknown time  Yes Yes   Sig: Take 1 tablet by mouth daily   metoprolol tartrate (LOPRESSOR) 50 mg tablet 10/23/2018 at Unknown time  Yes Yes   Sig: Take 25 mg by mouth every 12 (twelve) hours   omeprazole (PriLOSEC) 20 mg delayed release capsule 10/23/2018 at Unknown time  Yes Yes   Sig: Take 20 mg by mouth daily   simvastatin (ZOCOR) 20 mg tablet 10/23/2018 at Unknown time  Yes Yes   Sig: Take 20 mg by mouth daily at bedtime      Facility-Administered Medications: None       Past Medical History:   Diagnosis Date    Anemia, iron deficiency     Drug Therapy, poor absorbtion documented    Appendicitis     Cardiac disease     Colon cancer ([facility] [address] ) 12/2013    Resected: BREANNA    Congenital nystagmus     Left    Hyperlipidemia     Hypertension     Inguinal hernia     Sciatica 2010    resolved from problem list-2011       Past Surgical History:   Procedure Laterality Date    APPENDECTOMY      Appendicitis    CHOLECYSTECTOMY      COLON SIGMOID RESECTION  12/2013    COLON SURGERY      CORONARY ARTERY BYPASS GRAFT      x 2    CORONARY ARTERY BYPASS GRAFT      x2    CORONARY ARTERY BYPASS GRAFT      CORONARY ARTERY BYPASS GRAFT      INGUINAL HERNIA REPAIR      SHOULDER ARTHROCENTESIS Right     sub-acromial bursa area       Family History   Problem Relation Age of Onset    Hypertension Father     Heart disease Father     Heart disease Mother     Cancer Sister     Cancer Brother      I have reviewed and agree with the history as documented  Social History   Substance Use Topics    Smoking status: Never Smoker    Smokeless tobacco: Never Used    Alcohol use No        Review of Systems   Constitutional: Negative for activity change, appetite change, chills, fatigue and fever  HENT: Negative for ear pain, sneezing and sore throat  Eyes: Negative for pain and visual disturbance  Respiratory: Negative for cough and shortness of breath  Cardiovascular: Negative for chest pain and palpitations  Gastrointestinal: Positive for nausea and vomiting  Negative for abdominal pain, blood in stool, constipation and diarrhea  Genitourinary: Negative for dysuria and hematuria  Musculoskeletal: Negative for arthralgias, neck pain and neck stiffness  Skin: Negative for rash and wound  Neurological: Positive for dizziness and syncope  Negative for weakness, light-headedness, numbness and headaches  All other systems reviewed and are negative  Physical Exam  Physical Exam   Constitutional: He is oriented to person, place, and time  He appears well-developed and well-nourished  No distress  HENT:   Head: Normocephalic and atraumatic  Nose: Nose normal    Eyes: Pupils are equal, round, and reactive to light  Conjunctivae and EOM are normal    Neck: Normal range of motion  Neck supple  Cardiovascular: Normal rate, regular rhythm, normal heart sounds and intact distal pulses  Exam reveals no gallop and no friction rub  No murmur heard  Pulmonary/Chest: Effort normal and breath sounds normal  No respiratory distress  He has no wheezes  He has no rales  Abdominal: Soft  He exhibits no distension  There is no tenderness  Musculoskeletal: Normal range of motion  He exhibits no edema, tenderness or deformity  Lymphadenopathy:     He has no cervical adenopathy  Neurological: He is alert and oriented to person, place, and time  He displays normal reflexes  A cranial nerve deficit is present  No sensory deficit  He exhibits normal muscle tone  Coordination abnormal    Bilateral nystagmus, nonfatiguable noted  Skin: Skin is warm and dry  Capillary refill takes less than 2 seconds  He is not diaphoretic  No erythema  No pallor  Nursing note and vitals reviewed        Vital Signs  ED Triage Vitals [10/24/18 1023]   Temperature Pulse Respirations Blood Pressure SpO2   (!) 96 °F (35 6 °C) 70 18 147/64 96 %      Temp Source Heart Rate Source Patient Position - Orthostatic VS BP Location FiO2 (%) Tympanic Monitor Sitting Right arm --      Pain Score       No Pain           Vitals:    10/25/18 0955 10/25/18 1113 10/25/18 1535 10/25/18 1623   BP: 168/92 170/81 (!) 180/90 142/78   Pulse: 63 58 62    Patient Position - Orthostatic VS: Sitting - Orthostatic VS Sitting Sitting Sitting       Visual Acuity  Visual Acuity      Most Recent Value   L Pupil Size (mm)  2   R Pupil Size (mm)  2          ED Medications  Medications   sodium chloride 0 9 % bolus 500 mL (0 mL Intravenous Stopped 10/24/18 1213)   iohexol (OMNIPAQUE) 350 MG/ML injection (MULTI-DOSE) 100 mL (100 mL Intravenous Given 10/24/18 1152)   aspirin chewable tablet 324 mg (324 mg Oral Given 10/24/18 1417)   influenza vaccine, high-dose (FLUZONE HIGH-DOSE) IM injection TAIWO 0 5 mL (0 5 mL Intramuscular Given 10/24/18 1908)   amLODIPine (NORVASC) tablet 2 5 mg (2 5 mg Oral Given 10/25/18 1540)       Diagnostic Studies  Results Reviewed     Procedure Component Value Units Date/Time    Lipase [[id number]]  (Normal) Collected:  10/24/18 1050    Lab Status:  Final result Specimen:  Blood from Arm, Right Updated:  10/24/18 1615     Lipase 114 u/L     Troponin I [[id number]]  (Normal) Collected:  10/24/18 1421    Lab Status:  Final result Specimen:  Blood from Arm, Right Updated:  10/24/18 1450     Troponin I <0 01 ng/mL     Fingerstick Glucose (POCT) [[id number]]  (Abnormal) Collected:  10/24/18 1400    Lab Status:  Final result Updated:  10/24/18 1410     POC Glucose 112 (H) mg/dl     Troponin I [[id number]]  (Normal) Collected:  10/24/18 1050    Lab Status:  Final result Specimen:  Blood from Arm, Right Updated:  10/24/18 1123     Troponin I <0 01 ng/mL     Protime-INR [[id number]]  (Normal) Collected:  10/24/18 1050    Lab Status:  Final result Specimen:  Blood from Arm, Right Updated:  10/24/18 1113     Protime 11 1 seconds      INR 1 05    Narrative:       INR:  ,PROTIME:      APTT [[id number]]  (Normal) Collected:  10/24/18 1050    Lab Status:  Final result Specimen:  Blood from Arm, Right Updated:  10/24/18 1113     PTT 25 seconds     Narrative:       PTT:      CBC and differential [[id number]]  (Abnormal) Collected:  10/24/18 1050    Lab Status:  Final result Specimen:  Blood from Arm, Right Updated:  10/24/18 1112     WBC 13 40 (H) Thousand/uL      RBC 4 47 (L) Million/uL      Hemoglobin 14 6 g/dL      Hematocrit 43 2 %      MCV 97 fL      MCH 32 6 pg      MCHC 33 7 g/dL      RDW 12 6 %      MPV 8 8 (L) fL      Platelets 877 Thousands/uL      Neutrophils Relative 88 (H) %      Lymphocytes Relative 3 (L) %      Monocytes Relative 7 %      Eosinophils Relative 1 %      Basophils Relative 1 %      Neutrophils Absolute 11 80 (H) Thousands/µL      Lymphocytes Absolute 0 30 (L) Thousands/µL      Monocytes Absolute 1 00 (H) Thousand/µL      Eosinophils Absolute 0 10 Thousand/µL      Basophils Absolute 0 20 (H) Thousands/µL     Comprehensive metabolic panel [[id number]]  (Abnormal) Collected:  10/24/18 1050    Lab Status:  Final result Specimen:  Blood from Arm, Right Updated:  10/24/18 1112     Sodium 140 mmol/L      Potassium 4 1 mmol/L      Chloride 104 mmol/L      CO2 26 mmol/L      ANION GAP 10 mmol/L      BUN 27 (H) mg/dL      Creatinine 0 98 mg/dL      Glucose 158 (H) mg/dL      Calcium 8 8 mg/dL      AST 34 U/L      ALT 35 U/L      Alkaline Phosphatase 103 U/L      Total Protein 6 8 g/dL      Albumin 4 3 g/dL      Total Bilirubin 0 70 mg/dL      eGFR 70 ml/min/1 73sq m     Narrative:         National Kidney Disease Education Program recommendations are as follows:  GFR calculation is accurate only with a steady state creatinine  Chronic Kidney disease less than 60 ml/min/1 73 sq  meters  Kidney failure less than 15 ml/min/1 73 sq  meters      Fingerstick Glucose (POCT) [[id number]]  (Abnormal) Collected:  10/24/18 1044    Lab Status:  Final result Updated:  10/24/18 1055     POC Glucose 160 (H) mg/dl                  MRI brain wo contrast   Final Result by Valeria Dunham MD (10/25 7031)      1  No acute stroke or other intracranial acute MRI findings  2   Age appropriate cerebral atrophy and mild chronic microangiopathic changes of the brain  Workstation performed: [id number]         CTA head and neck with and without contrast   Final Result by Aditi Barrientos DO (10/24 4442)      No large vessel occlusion or intracranial aneurysm identified  Mild atherosclerotic calcification at the carotid bifurcation without significant stenosis  Workstation performed: [id number]         XR chest 2 views   Final Result by lAona Paula MD (10/24 8263)      No acute cardiopulmonary disease  Workstation performed: [id number]                    Procedures  Procedures       Phone Contacts  ED Phone Contact    ED Course  ED Course as of Nov 02 0625   Wed Oct 24, 2018   1045 Procedure Note: EKG  Date/Time: 10/24/18 10:45 AM   Performed by: Wale Goodrich  Authorized by: Wale Goodrich  ECG interpreted by me, the ED Provider: yes   The EKG demonstrates:  Rate 66  Rhythm NSR  QTc 469  No ST elevations/depressions                                  MDM  Number of Diagnoses or Management Options  Dizziness: new and requires workup  Dysmetria: new and requires workup  Leukocytosis: new and requires workup  Nystagmus: new and requires workup  Syncope: new and requires workup  Diagnosis management comments: Patient is an 49-year-old male with past medical history of hypertension, hyperlipidemia, anemia, cardiac disease presents to the emergency department for evaluation of dizziness  Patient reports an episode dizziness upon waking up this morning  Patient states that episode last for about an hour  Dizziness was company by gait/balance disturbance  Patient was never complained unilateral numbness tingling or weakness  No slurred speech or facial droop  Upon EMS arrival symptoms had resolved  Currently patient has no symptoms    On evaluation patient in no acute distress  No local signs of stroke  NIH stroke scale 1 secondary to the mild dysmetria  After discussion with attending decision was made to not call stroke alert as patient currently asymptomatic and NIH 1  Will perform TIA workup and re-evaluate  Likely admission for MRI       Amount and/or Complexity of Data Reviewed  Clinical lab tests: ordered and reviewed  Tests in the radiology section of CPT®: ordered and reviewed    Risk of Complications, Morbidity, and/or Mortality  Presenting problems: moderate  Diagnostic procedures: moderate  Management options: moderate    Patient Progress  Patient progress: stable    CritCare Time    Disposition  Final diagnoses:   Syncope   Dizziness   Leukocytosis   Dysmetria   Nystagmus     Time reflects when diagnosis was documented in both MDM as applicable and the Disposition within this note     Time User Action Codes Description Comment    10/24/2018  1:23 PM Davy Schwartz Add [R55] Syncope     10/24/2018  1:23 PM Davy Schwartz Add [R42] Dizziness     10/24/2018  1:23 PM Davy Schwartz Add [D72 829] Leukocytosis     10/24/2018  1:23 PM Davy Schwartz Add [R27 8] Dysmetria     10/24/2018  1:23 PM Lissette Tenorio Add [H55 00] Nystagmus     10/24/2018  1:54 PM Minerva Pardo Add [G45 9] TIA (transient ischemic attack)     10/25/2018  5:16 PM Cindy Obregon Add [E87 6] Hypokalemia     10/25/2018  5:16 PM Cindy Obregon Add [I10] Benign essential HTN       ED Disposition     ED Disposition Condition Comment    Admit  Case was discussed with Elena and the patient's admission status was agreed to be Admission Status: inpatient status to the service of Dr Trung Avelar           Follow-up Information    None         Discharge Medication List as of 10/25/2018  5:25 PM      START taking these medications    Details   lisinopril (ZESTRIL) 20 mg tablet Take 1 tablet (20 mg total) by mouth daily, Starting Fri 10/26/2018, Normal      amLODIPine (NORVASC) 5 mg tablet Take 1 tablet (5 mg total) by mouth 2 (two) times a day, Starting Thu 10/25/2018, Normal      potassium chloride (K-DUR,KLOR-CON) 20 mEq tablet Take 1 tablet (20 mEq total) by mouth daily, Starting Thu 10/25/2018, Normal         CONTINUE these medications which have NOT CHANGED    Details   aspirin 81 mg chewable tablet Chew 81 mg, Historical Med      folic acid (FOLVITE) 545 MCG tablet Take 400 mcg by mouth daily, Historical Med      metoprolol tartrate (LOPRESSOR) 50 mg tablet Take 25 mg by mouth every 12 (twelve) hours, Historical Med      Multiple Vitamins-Minerals (MULTIVITAL-M PO) Take 1 tablet by mouth, Historical Med      NITROGLYCERIN ER PO place 1 tablet by sublingual route at the 1st sign of attack; may repeat every 5 min until relief; if pain persists after 3 tablets in 15 min, prompt medical attention is recommended, Historical Med      omeprazole (PriLOSEC) 20 mg delayed release capsule Take 20 mg by mouth daily, Historical Med      simvastatin (ZOCOR) 20 mg tablet Take 20 mg by mouth daily at bedtime, Historical Med         STOP taking these medications       lisinopril-hydrochlorothiazide (PRINZIDE,ZESTORETIC) 20-12 5 MG per tablet Comments:   Reason for Stopping:               Outpatient Discharge Orders  Basic metabolic panel   Standing Status: Future  Standing Exp   Date: 10/25/19     Discharge Diet     Activity as tolerated     Call provider for:  persistent nausea or vomiting     Call provider for:  severe uncontrolled pain     Call provider for:  redness, tenderness, or signs of infection (pain, swelling, redness, odor or green/yellow discharge around incision site)     Call provider for: active or persistent bleeding     Call provider for:  difficulty breathing, headache or visual disturbances     Call provider for:  hives     Call provider for:  persistent dizziness or light-headedness     Call provider for:  extreme fatigue         ED Provider  Electronically Signed by           Anthony Javed PA-C  11/02/18 6956

## 2018-10-24 NOTE — ASSESSMENT & PLAN NOTE
Patient has coronary artery disease  Will continue aspirin, statin, beta-blocker and lisinopril  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  No evidence of ischemia on EKG

## 2018-10-24 NOTE — H&P
H&P- Gaynel Rising 5/14/1932, 80 y o  male MRN: [id number]    Unit/Bed#: ED 11 Encounter: [id number]    Primary Care Provider: Lydia Domingo MD   Date and time admitted to hospital: 10/24/2018 10:14 AM        TIA (transient ischemic attack)   Assessment & Plan    Patient woke up this morning in his usual state of health  He was having an episode loose stool and then passed out in the kitchen near the refrigerator  When he woke up he saw vomited on himself  He had another few episodes of nausea vomiting and diarrhea  He denied any chest pain or palpitations during the episode or any weakness of any part of his body  He states that he has never had an episode like this before  He has not had any recent medication changes  Any came to the emergency room his NIH stroke scale was 1 secondary to some nystagmus  On my exam his NIH stroke scale is 0  He is not a candidate for tP A  CT brain and CTA were negative for acute bleed or occlusion  Will proceed with doing an MRI of his brain tomorrow and also 2D echo to evaluate LV function  Neurology and Cardiology will be consulted     * Syncopal episodes   Assessment & Plan    Patient has syncopal episode today  It was accompanied by nausea vomiting and diarrhea  We will check orthostatic vitals and also check a 2D echo in keep him on a telemetry monitor to rule out arrhythmia or any structural heart disease  Will also ask Cardiology for evaluation  Mixed hyperlipidemia   Assessment & Plan    Continue statin therapy  Check lipid panel in the morning     CAD (coronary artery disease), native coronary artery   Assessment & Plan    Patient has coronary artery disease  Will continue aspirin, statin, beta-blocker and lisinopril  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  No evidence of ischemia on EKG  Benign essential HTN   Assessment & Plan    Patient's blood pressure is currently controlled    Will resume his home blood pressure medications with holding parameters  VTE Pharmacologic Prophylaxis:   Pharmacologic: Pharmacologic VTE Prophylaxis contraindicated due to low risk  Mechanical VTE Prophylaxis in Place: Yes    Patient Centered Rounds: I have performed bedside rounds with nursing staff today  Discussions with Specialists or Other Care Team Provider:  Discussed with ER provider    Education and Discussions with Family / Patient:  Discussed with patient at bedside about his hospital course  Time Spent for Care: 45 minutes  More than 50% of total time spent on counseling and coordination of care as described above  Current Length of Stay: 0 day(s)    Current Patient Status: Inpatient   Certification Statement: The patient will continue to require additional inpatient hospital stay due to tia    Discharge Plan:  Discharge to home when medically cleared  Code Status: No Order      Subjective:   Patient denies any chest pain or shortness of breath today  He stated that he woke up in his usual state of health and he had something to eat following which she started to have episodes of diarrhea and passed out  He woke up there was vomiting around him  He had a couple more episodes of nausea vomiting and diarrhea and then called 911  Nothing like this has ever happened to him before  He denied any palpitations or weakness or chest pain during the episode  A his symptoms completely resolved when he reached the emergency room  Objective:     Vitals:   Temp (24hrs), Av °F (35 6 °C), Min:96 °F (35 6 °C), Max:96 °F (35 6 °C)    Temp:  [96 °F (35 6 °C)] 96 °F (35 6 °C)  HR:  [63-76] 76  Resp:  [18-20] 18  BP: (137-186)/() 172/99  SpO2:  [96 %-99 %] 97 %  Body mass index is 23 82 kg/m²  Input and Output Summary (last 24 hours):        Intake/Output Summary (Last 24 hours) at 10/24/18 1432  Last data filed at 10/24/18 1213   Gross per 24 hour   Intake              500 ml   Output                0 ml   Net              500 ml Physical Exam:     Physical Exam   Constitutional: He is oriented to person, place, and time  He appears well-developed and well-nourished  HENT:   Head: Normocephalic and atraumatic  Right Ear: External ear normal    Left Ear: External ear normal    Mouth/Throat: Oropharynx is clear and moist    Eyes: Pupils are equal, round, and reactive to light  Conjunctivae and EOM are normal    Positive corrective nystagmus on the right eye   Neck: Normal range of motion  Neck supple  Cardiovascular: Normal rate, regular rhythm, normal heart sounds and intact distal pulses  Pulmonary/Chest: Effort normal and breath sounds normal    Abdominal: Soft  Bowel sounds are normal  He exhibits no mass  There is no tenderness  There is no rebound and no guarding  Genitourinary:   Genitourinary Comments: deferred   Musculoskeletal: Normal range of motion  Neurological: He is alert and oriented to person, place, and time  He has normal reflexes  Skin: Skin is warm and dry  No rash noted  Psychiatric: He has a normal mood and affect  Nursing note and vitals reviewed  Additional Data:     Labs:      Results from last 7 days  Lab Units 10/24/18  1050   WBC Thousand/uL 13 40*   HEMOGLOBIN g/dL 14 6   HEMATOCRIT % 43 2   PLATELETS Thousands/uL 167   NEUTROS PCT % 88*   LYMPHS PCT % 3*   MONOS PCT % 7   EOS PCT % 1       Results from last 7 days  Lab Units 10/24/18  1050   SODIUM mmol/L 140   POTASSIUM mmol/L 4 1   CHLORIDE mmol/L 104   CO2 mmol/L 26   BUN mg/dL 27*   CREATININE mg/dL 0 98   ANION GAP mmol/L 10   CALCIUM mg/dL 8 8   ALBUMIN g/dL 4 3   TOTAL BILIRUBIN mg/dL 0 70   ALK PHOS U/L 103   ALT U/L 35   AST U/L 34       Results from last 7 days  Lab Units 10/24/18  1050   INR  1 05       Results from last 7 days  Lab Units 10/24/18  1400 10/24/18  1044   POC GLUCOSE mg/dl 112* 160*                   * I Have Reviewed All Lab Data Listed Above  * Additional Pertinent Lab Tests Reviewed:  All Labs For Current Hospital Admission Reviewed    Imaging:    Imaging Reports Reviewed Today Include: ct brain   Imaging Personally Reviewed by Myself Includes:  none    Recent Cultures (last 7 days):           Last 24 Hours Medication List:        Today, Patient Was Seen By: Maria Luz Carter MD    ** Please Note: Dictation voice to text software may have been used in the creation of this document   **

## 2018-10-24 NOTE — ASSESSMENT & PLAN NOTE
Patient has syncopal episode today  It was accompanied by nausea vomiting and diarrhea  We will check orthostatic vitals and also check a 2D echo in keep him on a telemetry monitor to rule out arrhythmia or any structural heart disease  Will also ask Cardiology for evaluation

## 2018-10-24 NOTE — NURSING NOTE
Admitted pt from ER  Pt awake, alert and oriented  Speech clear  Pt passed swallow evaluation  Denies pain  Pt in NSR  BS clear  Call bell within pts reach  Stroke packet provided and discussed with pt

## 2018-10-24 NOTE — ASSESSMENT & PLAN NOTE
Patient's blood pressure is currently controlled  Will resume his home blood pressure medications with holding parameters

## 2018-10-24 NOTE — PLAN OF CARE
Activity Intolerance/Impaired Mobility     Mobility/activity is maintained at optimum level for patient Progressing        Communication Impairment     Ability to express needs and understand communication [address] Velazquez Discharge to home or other facility with appropriate resources Progressing        INFECTION - ADULT     Absence or prevention of progression during hospitalization Progressing     Absence of fever/infection during neutropenic period Progressing        Knowledge Deficit     Patient/family/caregiver demonstrates understanding of disease process, treatment plan, medications, and discharge instructions Progressing        Neurological Deficit     Neurological status is stable or improving Progressing        Nutrition     Nutrition/Hydration status is improving Progressing        PAIN - ADULT     Verbalizes/displays adequate comfort level or baseline comfort level Progressing        Potential for Aspiration     Non-ventilated patient's risk of aspiration is minimized Progressing     Ventilated patient's risk of aspiration is minimized Progressing        Potential for Falls     Patient will remain free of falls Progressing        SAFETY ADULT     Maintain or return to baseline ADL function Progressing     Maintain or return mobility status to optimal level Progressing     Patient will remain free of falls Progressing

## 2018-10-24 NOTE — ASSESSMENT & PLAN NOTE
Patient woke up this morning in his usual state of health  He was having an episode loose stool and then passed out in the kitchen near the refrigerator  When he woke up he saw vomited on himself  He had another few episodes of nausea vomiting and diarrhea  He denied any chest pain or palpitations during the episode or any weakness of any part of his body  He states that he has never had an episode like this before  He has not had any recent medication changes  Any came to the emergency room his NIH stroke scale was 1 secondary to some nystagmus  On my exam his NIH stroke scale is 0  He is not a candidate for tP A  CT brain and CTA were negative for acute bleed or occlusion  Will proceed with doing an MRI of his brain tomorrow and also 2D echo to evaluate LV function    Neurology and Cardiology will be consulted

## 2018-10-25 ENCOUNTER — APPOINTMENT (INPATIENT)
Dept: MRI IMAGING | Facility: HOSPITAL | Age: 83
DRG: 312 | End: 2018-10-25
Payer: MEDICARE

## 2018-10-25 ENCOUNTER — APPOINTMENT (INPATIENT)
Dept: NON INVASIVE DIAGNOSTICS | Facility: HOSPITAL | Age: 83
DRG: 312 | End: 2018-10-25
Payer: MEDICARE

## 2018-10-25 VITALS
DIASTOLIC BLOOD PRESSURE: 78 MMHG | SYSTOLIC BLOOD PRESSURE: 142 MMHG | WEIGHT: 165.12 LBS | HEART RATE: 62 BPM | RESPIRATION RATE: 18 BRPM | TEMPERATURE: 97.8 F | OXYGEN SATURATION: 98 % | BODY MASS INDEX: 23.64 KG/M2 | HEIGHT: 70 IN

## 2018-10-25 LAB
ANION GAP SERPL CALCULATED.3IONS-SCNC: 8 MMOL/L (ref 5–14)
BASOPHILS # BLD AUTO: 0 THOUSANDS/ΜL (ref 0–0.1)
BASOPHILS NFR BLD AUTO: 1 % (ref 0–1)
BUN SERPL-MCNC: 16 MG/DL (ref 5–25)
CALCIUM SERPL-MCNC: 8 MG/DL (ref 8.4–10.2)
CHLORIDE SERPL-SCNC: 109 MMOL/L (ref 97–108)
CHOLEST SERPL-MCNC: 82 MG/DL
CO2 SERPL-SCNC: 23 MMOL/L (ref 22–30)
CREAT SERPL-MCNC: 0.83 MG/DL (ref 0.7–1.5)
EOSINOPHIL # BLD AUTO: 0.1 THOUSAND/ΜL (ref 0–0.4)
EOSINOPHIL NFR BLD AUTO: 2 % (ref 0–6)
ERYTHROCYTE [DISTWIDTH] IN BLOOD BY AUTOMATED COUNT: 13 %
EST. AVERAGE GLUCOSE BLD GHB EST-MCNC: 117 MG/DL
GFR SERPL CREATININE-BSD FRML MDRD: 80 ML/MIN/1.73SQ M
GLUCOSE SERPL-MCNC: 122 MG/DL (ref 70–99)
HBA1C MFR BLD: 5.7 % (ref 4.2–6.3)
HCT VFR BLD AUTO: 37.7 % (ref 41–53)
HDLC SERPL-MCNC: 24 MG/DL (ref 40–59)
HGB BLD-MCNC: 13 G/DL (ref 13.5–17.5)
LDLC SERPL CALC-MCNC: 36 MG/DL
LYMPHOCYTES # BLD AUTO: 0.7 THOUSANDS/ΜL (ref 0.5–4)
LYMPHOCYTES NFR BLD AUTO: 8 % (ref 20–50)
MAGNESIUM SERPL-MCNC: 2 MG/DL (ref 1.6–2.3)
MCH RBC QN AUTO: 33.1 PG (ref 26.8–34.3)
MCHC RBC AUTO-ENTMCNC: 34.4 G/DL (ref 31.4–37.4)
MCV RBC AUTO: 96 FL (ref 80–100)
MONOCYTES # BLD AUTO: 0.9 THOUSAND/ΜL (ref 0.2–0.9)
MONOCYTES NFR BLD AUTO: 10 % (ref 1–10)
NEUTROPHILS # BLD AUTO: 7.3 THOUSANDS/ΜL (ref 1.8–7.8)
NEUTS SEG NFR BLD AUTO: 80 % (ref 45–65)
PLATELET # BLD AUTO: 160 THOUSANDS/UL (ref 150–450)
PMV BLD AUTO: 8.9 FL (ref 8.9–12.7)
POTASSIUM SERPL-SCNC: 3.5 MMOL/L (ref 3.6–5)
RBC # BLD AUTO: 3.93 MILLION/UL (ref 4.5–5.9)
SODIUM SERPL-SCNC: 140 MMOL/L (ref 137–147)
TRIGL SERPL-MCNC: 108 MG/DL
WBC # BLD AUTO: 9.1 THOUSAND/UL (ref 4.31–10.16)

## 2018-10-25 PROCEDURE — 93306 TTE W/DOPPLER COMPLETE: CPT | Performed by: INTERNAL MEDICINE

## 2018-10-25 PROCEDURE — 99239 HOSP IP/OBS DSCHRG MGMT >30: CPT | Performed by: FAMILY MEDICINE

## 2018-10-25 PROCEDURE — 93005 ELECTROCARDIOGRAM TRACING: CPT

## 2018-10-25 PROCEDURE — 99222 1ST HOSP IP/OBS MODERATE 55: CPT | Performed by: INTERNAL MEDICINE

## 2018-10-25 PROCEDURE — 80048 BASIC METABOLIC PNL TOTAL CA: CPT | Performed by: FAMILY MEDICINE

## 2018-10-25 PROCEDURE — 85025 COMPLETE CBC W/AUTO DIFF WBC: CPT | Performed by: FAMILY MEDICINE

## 2018-10-25 PROCEDURE — G8982 BODY POS GOAL STATUS: HCPCS

## 2018-10-25 PROCEDURE — 97161 PT EVAL LOW COMPLEX 20 MIN: CPT

## 2018-10-25 PROCEDURE — 83036 HEMOGLOBIN GLYCOSYLATED A1C: CPT | Performed by: FAMILY MEDICINE

## 2018-10-25 PROCEDURE — 83735 ASSAY OF MAGNESIUM: CPT | Performed by: FAMILY MEDICINE

## 2018-10-25 PROCEDURE — 70551 MRI BRAIN STEM W/O DYE: CPT

## 2018-10-25 PROCEDURE — G8983 BODY POS D/C STATUS: HCPCS

## 2018-10-25 PROCEDURE — G8981 BODY POS CURRENT STATUS: HCPCS

## 2018-10-25 PROCEDURE — 99231 SBSQ HOSP IP/OBS SF/LOW 25: CPT | Performed by: PSYCHIATRY & NEUROLOGY

## 2018-10-25 PROCEDURE — 80061 LIPID PANEL: CPT | Performed by: FAMILY MEDICINE

## 2018-10-25 PROCEDURE — 93306 TTE W/DOPPLER COMPLETE: CPT

## 2018-10-25 RX ORDER — AMLODIPINE BESYLATE 2.5 MG/1
2.5 TABLET ORAL ONCE
Status: COMPLETED | OUTPATIENT
Start: 2018-10-25 | End: 2018-10-25

## 2018-10-25 RX ORDER — AMLODIPINE BESYLATE 5 MG/1
5 TABLET ORAL 2 TIMES DAILY
Qty: 60 TABLET | Refills: 0 | Status: SHIPPED | OUTPATIENT
Start: 2018-10-25 | End: 2018-11-01 | Stop reason: SDUPTHER

## 2018-10-25 RX ORDER — LISINOPRIL 20 MG/1
20 TABLET ORAL DAILY
Status: DISCONTINUED | OUTPATIENT
Start: 2018-10-26 | End: 2018-10-25 | Stop reason: HOSPADM

## 2018-10-25 RX ORDER — POTASSIUM CHLORIDE 20 MEQ/1
20 TABLET, EXTENDED RELEASE ORAL
Status: DISCONTINUED | OUTPATIENT
Start: 2018-10-25 | End: 2018-10-25 | Stop reason: HOSPADM

## 2018-10-25 RX ORDER — POTASSIUM CHLORIDE 20 MEQ/1
20 TABLET, EXTENDED RELEASE ORAL DAILY
Qty: 14 TABLET | Refills: 0 | Status: SHIPPED | OUTPATIENT
Start: 2018-10-25 | End: 2018-11-01 | Stop reason: SDUPTHER

## 2018-10-25 RX ORDER — AMLODIPINE BESYLATE 2.5 MG/1
2.5 TABLET ORAL 2 TIMES DAILY
Status: DISCONTINUED | OUTPATIENT
Start: 2018-10-25 | End: 2018-10-25 | Stop reason: HOSPADM

## 2018-10-25 RX ORDER — LISINOPRIL 20 MG/1
20 TABLET ORAL DAILY
Qty: 30 TABLET | Refills: 0 | Status: SHIPPED | OUTPATIENT
Start: 2018-10-26 | End: 2018-11-19 | Stop reason: SDUPTHER

## 2018-10-25 RX ADMIN — POTASSIUM CHLORIDE 20 MEQ: 20 TABLET, EXTENDED RELEASE ORAL at 15:40

## 2018-10-25 RX ADMIN — ACETAMINOPHEN 650 MG: 325 TABLET ORAL at 00:07

## 2018-10-25 RX ADMIN — POTASSIUM CHLORIDE 20 MEQ: 20 TABLET, EXTENDED RELEASE ORAL at 14:49

## 2018-10-25 RX ADMIN — SODIUM CHLORIDE 125 ML/HR: 9 INJECTION, SOLUTION INTRAVENOUS at 01:40

## 2018-10-25 RX ADMIN — ASPIRIN 81 MG 81 MG: 81 TABLET ORAL at 09:10

## 2018-10-25 RX ADMIN — AMLODIPINE BESYLATE 2.5 MG: 2.5 TABLET ORAL at 15:40

## 2018-10-25 RX ADMIN — FOLIC ACID 500 MCG: 1 TABLET ORAL at 09:09

## 2018-10-25 RX ADMIN — AMLODIPINE BESYLATE 2.5 MG: 2.5 TABLET ORAL at 10:42

## 2018-10-25 RX ADMIN — POTASSIUM CHLORIDE 20 MEQ: 20 TABLET, EXTENDED RELEASE ORAL at 09:10

## 2018-10-25 RX ADMIN — METOPROLOL TARTRATE 25 MG: 25 TABLET, FILM COATED ORAL at 09:09

## 2018-10-25 RX ADMIN — SODIUM CHLORIDE 125 ML/HR: 9 INJECTION, SOLUTION INTRAVENOUS at 09:16

## 2018-10-25 NOTE — ASSESSMENT & PLAN NOTE
Patient remains hypertensive throughout the day  Will adjust medications - continue metoprolol and lisinopril, d/c HCTZ and add amlodipine 5 mg BID  This, however, is unlikely to be related to the syncopal episode

## 2018-10-25 NOTE — NURSING NOTE
Pt c/o 3 /10 frontal HA  Requested Tylenol  Dr Moses Aver made aware  Tylenol order obtained and given  Will continue to monitor and reassess  Call bell in reach

## 2018-10-25 NOTE — NURSING NOTE
Pt  Left with all personal belongings  Insisted on waiting for son down in [facility]  AVS teaching done, Pt  understands the importance of blood draw and given script  Pt  Ambulated with CNA to [facility]

## 2018-10-25 NOTE — NURSING NOTE
No changes in assessment  No changes neurologically  Pt denies any c/o  No acute distress noted  Call bell in reach, will continue to mointor

## 2018-10-25 NOTE — NURSING NOTE
Pt  transported to MRI  VSS prior to leaving the unit  Transported via wheelchair with transportation staff

## 2018-10-25 NOTE — CONSULTS
ENCOUNTER DATE: 10/25/18 9:25 AM  PATIENT NAME: Darling Mesa   5/14/1932    [id number]  Age: 80 y o  Sex: male  AUTHOR: Jake House MD  PRIMARYCARE PHYSICIAN: Christopher Chin MD  INPATIENT ATTENDING PHYSICIAN: Fadi Gross MD  *-*-*-*-*-*-*-*-*-*-*-*-*-*-*-*-*-*-*-*-*-*-*-*-*-*-*-*-*-*-*-*-*-*-*-*-*-*-*-*-*-*-*-*-*-*-*-*-*-*-*-*-*-*-  REASON FORCONSULTATION:  Syncope    *-*-*-*-*-*-*-*-*-*-*-*-*-*-*-*-*-*-*-*-*-*-*-*-*-*-*-*-*-*-*-*-*-*-*-*-*-*-*-*-*-*-*-*-*-*-*-*-*-*-*-*-*-*-  HISTORY OF PRESENT ILLNESS:    Patient is a pleasant 51-year-old  gentleman past medical history significant for:  1  History of coronary artery disease/2 vessel CABG in 2005 infectious (LIMA to LAD and SVG to unknown)  2  Benign Essential hypertension  3  Mixed Dyslipidemia  4  Anemia  5  History of colon CA  6  History of appendicitis in the past  7  History of inguinal hernia repair    Patient presented to the emergency room yesterday from home after having witnessed syncopal event  He reports that he was in apparent normal state of health with no significant recent problems until yesterday morning  Apparently he woke up 8 his usual time early in the morning and was doing his morning shows  He did have 1 large volume bowel movement which was slightly unusual   Following that he ate his normal breakfast and read his newspaper  He was going to kitchen to put away his cup of coffee in the sink when he had sudden bout of feeling lightheadedness and dizzy  As he turned to hold on to the handle of the freezer in the kitchen he passed out and does not remember anything of associated with the event  He apparently was seen by his wife  He woke up on the kitchen floor [place] a few moments and noted that he had an emesis during the episode  He was able to stand up and go to the bathroom where he had 2 more episodes of vomiting  Was feeling dizzy throughout these episodes but had no palpitations    His wife called EMS and patient was brought to emergency  His dizziness and his other symptoms resolved completely after he had the 2nd emesis episode  He denies any suspicious food intake prior to these events  Other than these events he had no recent symptoms of chest pain, shortness of breath or other symptoms suggestive of angina  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 with no recent decline in his exercise tolerance  He reports that about a month and have back he had an ER visit when he noted headache and feeling foggy and had elevated blood pressure  In the ER at that time he was noted to have blood pressure of 160/98  He was discharged from the ER 6 hr later and was advised to follow with PCP  He recorded his home blood pressures which were reviewed by his primary physicians staff and it was noted that overall blood pressure was satisfactory controlled  No changes were made to his medications  Overnight he has had no significant events on the telemetry  His workup so far is negative for any acute CNS or cardiac events  He is comfortable at this time without significant symptoms      *-*-*-*-*-*-*-*-*-*-*-*-*-*-*-*-*-*-*-*-*-*-*-*-*-*-*-*-*-*-*-*-*-*-*-*-*-*-*-*-*-*-*-*-*-*-*-*-*-*-*-*-*-*  PAST MEDICAL HISTORY:   Past Medical History:   Diagnosis Date    Anemia, iron deficiency     Drug Therapy, poor absorbtion documented    Appendicitis     Cardiac disease     Colon cancer ([facility] [address] ) 12/2013    Resected: BREANNA    Congenital nystagmus     Left    Hyperlipidemia     Hypertension     Inguinal hernia     Sciatica 2010    resolved from problem list-2011       PAST SURGICAL HISTORY:   Past Surgical History:   Procedure Laterality Date    APPENDECTOMY      Appendicitis    CHOLECYSTECTOMY      COLON SIGMOID RESECTION  12/2013    COLON SURGERY      CORONARY ARTERY BYPASS GRAFT      x 2    CORONARY ARTERY BYPASS GRAFT      x2    CORONARY ARTERY BYPASS GRAFT      CORONARY ARTERY BYPASS GRAFT      INGUINAL HERNIA REPAIR      SHOULDER ARTHROCENTESIS Right     sub-acromial bursa area       FAMILY HISTORY:  Family History   Problem Relation Age of Onset    Hypertension Father     Heart disease Father     Heart disease Mother    Maxwell Hernandez Cancer Sister     Cancer Brother        SOCIAL HISTORY:  [unfilled]  History   Smoking Status    Never Smoker   Smokeless Tobacco    Never Used     History   Alcohol Use No     History   Drug Use No     [unfilled]    *-*-*-*-*-*-*-*-*-*-*-*-*-*-*-*-*-*-*-*-*-*-*-*-*-*-*-*-*-*-*-*-*-*-*-*-*-*-*-*-*-*-*-*-*-*-*-*-*-*-*-*-*-*  ALLERGIES:  Allergies   Allergen Reactions    Pregabalin      Other reaction(s): M/S CHANGE     *-*-*-*-*-*-*-*-*-*-*-*-*-*-*-*-*-*-*-*-*-*-*-*-*-*-*-*-*-*-*-*-*-*-*-*-*-*-*-*-*-*-*-*-*-*-*-*-*-*-*-*-*-*  CURRENT HOSPITAL MEDICATIONS:     Current Facility-Administered Medications:     acetaminophen (TYLENOL) tablet 650 mg, 650 mg, Oral, Q6H PRN, Martin Whitt MD, 650 mg at 10/25/18 0007    aspirin chewable tablet 81 mg, 81 mg, Oral, Daily, Elizabeth Juarez MD, 81 mg at 10/25/18 0910    atorvastatin (LIPITOR) tablet 40 mg, 40 mg, Oral, QPM, Elizabeth Juarez MD, 40 mg at 64/65/49 7359    folic acid (FOLVITE) tablet 500 mcg, 500 mcg, Oral, Daily, Elizabeth Juarez MD, 500 mcg at 10/25/18 0909    lisinopril-hydrochlorothiazide (PRINZIDE 20/12  5) combo dose, , Oral, Daily, Elizabeth Juarez MD    metoprolol tartrate (LOPRESSOR) tablet 25 mg, 25 mg, Oral, Q12H [address], Elizabeth Juarez MD, 25 mg at 10/25/18 0909    ondansetron (ZOFRAN) injection 4 mg, 4 mg, Intravenous, Q6H PRN, Elizabeth Juarez MD    potassium chloride (K-DUR,KLOR-CON) CR tablet 20 mEq, 20 mEq, Oral, TID With Meals, Merline Richters, MD, 20 mEq at 10/25/18 0910    sodium chloride 0 9 % infusion, 125 mL/hr, Intravenous, Continuous, Elizabeth Juarez MD, Last Rate: 125 mL/hr at 10/25/18 0916, 125 mL/hr at 10/25/18 6832    *-*-*-*-*-*-*-*-*-*-*-*-*-*-*-*-*-*-*-*-*-*-*-*-*-*-*-*-*-*-*-*-*-*-*-*-*-*-*-*-*-*-*-*-*-*-*-*-*-*-*-*-*-*  REVIEW OF SYMPTOMS:    Positive for:  Witnessed syncope event, nausea and vomiting, diarrhea yesterday  Symptoms resolved  Negative for: All remaining as reviewed below and in HPI  SYSTEM SYMPTOMS REVIEWED:  Generalweight change, fever, night sweats  Respirato  Cardiovascularchest pain, syncope, dyspnea on exertion, edema, decline in exercise tolerance, claudication   Gastrointestinalpersistent vomiting, diarrhea, abdominal distention, blood in stool   Muscular or skeletaljoint pain or swelling   Neurologicheadaches, syncope, abnormal movement  Hematologichistory of easy bruising and bleeding   Endocrinethyroid enlargement, heat or cold intolerance, polyuria   Psychiatricanxiety, depression     *-*-*-*-*-*-*-*-*-*-*-*-*-*-*-*-*-*-*-*-*-*-*-*-*-*-*-*-*-*-*-*-*-*-*-*-*-*-*-*-*-*-*-*-*-*-*-*-*-*-*-*-*-*-  VITAL SIGNS:  Vitals:    10/25/18 0500 10/25/18 0735 10/25/18 0800 10/25/18 0815   BP:  (!) 180/92 157/99 157/99   BP Location:  Left arm  Left arm   Pulse:  67     Resp:  16     Temp:  98 8 °F (37 1 °C)     TempSrc:  Temporal     SpO2:  97%     Weight: 74 9 kg (165 lb 2 oz)      Height:           *-*-*-*-*-*-*-*-*-*-*-*-*-*-*-*-*-*-*-*-*-*-*-*-*-*-*-*-*-*-*-*-*-*-*-*-*-*-*-*-*-*-*-*-*-*-*-*-*-*-*-*-*-*-  PHYSICAL EXAM:  General Appearance:    Alert, cooperative, no distress, appears stated age, well built   Head, Eyes, ENT:    No obvious abnormality, moist mucous mebranes  Neck:   Supple, no carotid bruit or JVD   Back:     Symmetric, no curvature  Lungs:     Respirations unlabored  Clear to auscultation bilaterally,    Chest wall:    No tenderness or deformity   Heart:    Regular rate and rhythm, S1 and S2 normal, no murmur, rub  or gallop     Abdomen:     Soft, non-tender, No obvious masses, or organomegaly   Extremities:   Extremities normal, no cyanosis or edema    Skin:   Skin color, texture, turgor normal, no rashes or lesions     *-*-*-*-*-*-*-*-*-*-*-*-*-*-*-*-*-*-*-*-*-*-*-*-*-*-*-*-*-*-*-*-*-*-*-*-*-*-*-*-*-*-*-*-*-*-*-*-*-*-*-*-*-*-  LABORATORY DATA:  I have personally reviewed pertinent labs      Sodium   Date Value Ref Range Status   10/25/2018 140 137 - 147 mmol/L Final   10/24/2018 140 137 - 147 mmol/L Final   08/26/2018 139 137 - 147 mmol/L Final   06/18/2018 137 137 - 147 MEQ/L Final     Potassium   Date Value Ref Range Status   10/25/2018 3 5 (L) 3 6 - 5 0 mmol/L Final   10/24/2018 4 1 3 6 - 5 0 mmol/L Final   08/26/2018 4 2 3 6 - 5 0 mmol/L Final   06/18/2018 4 0 3 6 - 5 0 MEQ/L Final     Chloride   Date Value Ref Range Status   10/25/2018 109 (H) 97 - 108 mmol/L Final   10/24/2018 104 97 - 108 mmol/L Final   08/26/2018 102 97 - 108 mmol/L Final   06/18/2018 101 97 - 108 MEQ/L Final     CO2   Date Value Ref Range Status   10/25/2018 23 22 - 30 mmol/L Final   10/24/2018 26 22 - 30 mmol/L Final   08/26/2018 29 22 - 30 mmol/L Final   06/18/2018 27 22 - 30 MMOL/L Final     Anion Gap   Date Value Ref Range Status   06/18/2018 9 5 - 14 MMOL/L Final     BUN   Date Value Ref Range Status   10/25/2018 16 5 - 25 mg/dL Final   10/24/2018 27 (H) 5 - 25 mg/dL Final   08/26/2018 21 5 - 25 mg/dL Final   06/18/2018 20 5 - 25 MG/DL Final     Creatinine   Date Value Ref Range Status   10/25/2018 0 83 0 70 - 1 50 mg/dL Final     Comment:     Standardized to IDMS reference method   10/24/2018 0 98 0 70 - 1 50 mg/dL Final     Comment:     Standardized to IDMS reference method   08/26/2018 0 88 0 70 - 1 50 mg/dL Final     Comment:     Standardized to IDMS reference method     eGFR   Date Value Ref Range Status   10/25/2018 80 >60 ml/min/1 73sq m Final   10/24/2018 70 >60 ml/min/1 73sq m Final   08/26/2018 78 >60 ml/min/1 73sq m Final     Glucose   Date Value Ref Range Status   06/18/2018 109 (H) 70 - 99 MG/DL Final     Calcium   Date Value Ref Range Status   10/25/2018 8 0 (L) 8 4 - 10 2 mg/dL Final   10/24/2018 8 8 8 4 - 10 2 mg/dL Final   08/26/2018 8 8 8 4 - 10 2 mg/dL Final   06/18/2018 8 6 8 4 - 10 2 MG/DL Final     AST   Date Value Ref Range Status   10/24/2018 34 17 - 59 U/L Final     Comment:       Specimen collection should occur prior to Sulfasalazine administration due to the potential for falsely depressed results  08/26/2018 35 17 - 59 U/L Final     Comment:       Specimen collection should occur prior to Sulfasalazine administration due to the potential for falsely depressed results  ALT   Date Value Ref Range Status   10/24/2018 35 9 - 52 U/L Final     Comment:       Specimen collection should occur prior to Sulfasalazine administration due to the potential for falsely depressed results  08/26/2018 42 9 - 52 U/L Final     Comment:       Specimen collection should occur prior to Sulfasalazine administration due to the potential for falsely depressed results        Alkaline Phosphatase   Date Value Ref Range Status   10/24/2018 103 43 - 122 U/L Final   08/26/2018 90 43 - 122 U/L Final     Magnesium   Date Value Ref Range Status   10/25/2018 2 0 1 6 - 2 3 mg/dL Final     WBC   Date Value Ref Range Status   10/25/2018 9 10 4 31 - 10 16 Thousand/uL Final   10/24/2018 13 40 (H) 4 31 - 10 16 Thousand/uL Final   08/26/2018 6 00 4 31 - 10 16 Thousand/uL Final   06/18/2018 6 5 4 5 - 11 0 K/MCL Final     Hemoglobin   Date Value Ref Range Status   10/25/2018 13 0 (L) 13 5 - 17 5 g/dL Final   10/24/2018 14 6 13 5 - 17 5 g/dL Final   08/26/2018 13 9 13 5 - 17 5 g/dL Final   06/18/2018 13 6 13 5 - 17 5 G/DL Final     Platelets   Date Value Ref Range Status   10/25/2018 160 150 - 450 Thousands/uL Final   10/24/2018 167 150 - 450 Thousands/uL Final   08/26/2018 178 150 - 450 Thousands/uL Final   06/18/2018 182 150 - 450 K/MCL Final     PTT   Date Value Ref Range Status   10/24/2018 25 23 - 34 seconds Final     Comment:     Therapeutic Heparin Range =  52-80 seconds   08/26/2018 26 23 - 34 seconds Final     Comment:     Therapeutic Heparin Range =  52-80 seconds     INR   Date Value Ref Range Status   10/24/2018 1 05 0 89 - 1 10 Final   08/26/2018 1 06 0 89 - 1 10 Final     No results found for: CKMB, BNP, DIGOXIN  No results found for: TSH, T4, T3  HDL, Direct   Date Value Ref Range Status   10/25/2018 24 (L) 40 - 59 mg/dL Final     Comment:       HDL Cholesterol:       High    >60 mg/dL       Low     <41 mg/dL  Specimen collection should occur prior to Metamizole administration due to the potential for falsley depressed results  Triglycerides   Date Value Ref Range Status   10/25/2018 108 <150 mg/dL Final     Comment:       Triglyceride:     Normal          <150 mg/dl     Borderline High 150-199 mg/dl     High            200-499 mg/dl        Very High       >499 mg/dl    Specimen collection should occur prior to N-Acetylcysteine or Metamizole administration due to the potential for falsely depressed results  No results found for: HGBA1C  No results found for: Didi Carias, GRAMSTAIN, URINECX, WOUNDCULT, BODYFLUIDCUL, MRSACULTURE, INFLUAPCR, INFLUBPCR, RSVPCR, LEGIONELLAUR, CDIFFTOXINB    *-*-*-*-*-*-*-*-*-*-*-*-*-*-*-*-*-*-*-*-*-*-*-*-*-*-*-*-*-*-*-*-*-*-*-*-*-*-*-*-*-*-*-*-*-*-*-*-*-*-*-*-*-*-  RADIOLOGY RESULTS:  Cta Head And Neck With And Without Contrast    Result Date: 10/24/2018  Impression: No large vessel occlusion or intracranial aneurysm identified  Mild atherosclerotic calcification at the carotid bifurcation without significant stenosis  Workstation performed: [id number]     Xr Chest 2 Views    Result Date: 10/24/2018  Impression: No acute cardiopulmonary disease   Workstation performed: [id number]       *-*-*-*-*-*-*-*-*-*-*-*-*-*-*-*-*-*-*-*-*-*-*-*-*-*-*-*-*-*-*-*-*-*-*-*-*-*-*-*-*-*-*-*-*-*-*-*-*-*-*-*-*-*-  CURRENT ECG:  ECG performed at the time of admission shows sinus rhythm with incomplete right bundle-branch block with no significant ST T wave abnormalities,    Repeat ECG performed this morning shows sinus rhythm with incomplete right bundle-branch block, borderline QT prolongation, nonspecific ST T wave abnormalities  ECHOCARDIOGRAM AND OTHER CARDIOLOGY RESULTS:  Stress echocardiogram in June 2016 showed normal function capacity, hypertensive response to exercise and no evidence of ischemia or infarction  *-*-*-*-*-*-*-*-*-*-*-*-*-*-*-*-*-*-*-*-*-*-*-*-*-*-*-*-*-*-*-*-*-*-*-*-*-*-*-*-*-*-*-*-*-*-*-*-*-*-*-*-*-*-  CARDIOLOGY ASSESSMENT & PLAN:  1  Transient loss of consciousness, suspicious for vasovagal syncope  2  Coronary a coronary artery diseasert of native vesselse without angina  3   Uncontrolled hypertension    Patient's description of symptom and the associated events of vomiting dizziness and large volume bowel movement is suspicious for vasovagal syncope although it is reasonable to rule out CNS and cardiac causes  His blood pressure is noted to be significantly elevated and needs better control      - check orthostatic blood pressures  - ambulate the patient and assess heart rate response to activity and check  blood pressure following exercise  - will follow-up with the exercise  - Changing Lisinopril HCTZ to lisinopril only, adding amlodipine 2 5mg po twice daily   - PT evaluation and traning with physical counter pressure measures such as arm muscle tensing, and sitting down and leg crossing during presyncope to avoid syncope  - Will need followup with Dr Lubertha Closs upon discharge  *-*-*-*-*-*-*-*-*-*-*-*-*-*-*-*-*-*-*-*-*-*-*-*-*-*-*-*-*-*-*-*-*-*-*-*-*-*-*-*-*-*-*-*-*-*-*-*-*-*-*-*-*-*-  SIGNATURES:   [unfilled]   Lacie Camejo MD     CC:   Maylin Vallejo MD   No ref   provider found

## 2018-10-25 NOTE — NURSING NOTE
Patient negative for orthostatic hypotension  Patient ambulated independently, wide based gait  Patient did not complain of dizziness or light headedness when asked  Patient is currently sitting in the recliner  Will continue to monitor

## 2018-10-25 NOTE — UTILIZATION REVIEW
Initial Clinical Review    Admission: Date/Time/Statement: 10/24/18 @ 1326 INPATIENT    Orders Placed This Encounter   Procedures    Inpatient Admission (expected length of stay for this patient is greater than two midnights)     Standing Status:   Standing     Number of Occurrences:   1     Order Specific Question:   Admitting Physician     Answer:   Sue Lay [[id number]]     Order Specific Question:   Level of Care     Answer:   Level 1 Stepdown [13]     Order Specific Question:   Estimated length of stay     Answer:   More than 2 Midnights     Order Specific Question:   Certification     Answer:   I certify that inpatient services are medically necessary for this patient for a duration of greater than two midnights  See H&P and MD Progress Notes for additional information about the patient's course of treatment  ED: Date/Time/Mode of Arrival:   ED Arrival Information     Expected Arrival Acuity Means of Arrival Escorted By Service Admission Type    - 10/24/2018 10:13 Urgent [address] General Medicine Urgent    Arrival Complaint    syncope/palpitation          Chief Complaint:   Chief Complaint   Patient presents with    Syncope     pt reports having a syncopal episode from standing position this morning, denies head/neck/back pain, reports vomiting/diarrea since yesterday       History of Illness:      Patient is 54-year-old male with history of hypertension, hyperlipidemia, cardiac disease, CABG x2, presents emergency department for evaluation of syncope and dizziness  Patient states he was walking around normal eating breakfast and getting dressed  He states he stood up and walked to the kitchen  He states that while he was standing in the kitchen after couple minutes he started to feel dizzy  He reached out for the handle of the freezer, which is the last thing he remembers  He then woke up on the kitchen floor    States he had emesis on his shirt  He then woke up went to the bathroom and had 2-3 more episodes of vomiting  He states he had persistent dizziness  EMS was called  Upon arrival to emergency department all symptoms have resolved  Patient no longer having dizziness or nausea  Patient with no suspicious food intake  Patient was not feeling sick or any signs of gastritis prior to this episode  ED Vital Signs:   ED Triage Vitals [10/24/18 1023]   Temperature Pulse Respirations Blood Pressure SpO2   (!) 96 °F (35 6 °C) 70 18 147/64 96 %      Temp Source Heart Rate Source Patient Position - Orthostatic VS BP Location FiO2 (%)   Tympanic Monitor Sitting Right arm --      Pain Score       No Pain        Wt Readings from Last 1 Encounters:   10/25/18 74 9 kg (165 lb 2 oz)       Vital Signs (abnormal):     MAP (mmHg)  SpO2  O2 Device  Patient Position - Orthostatic VS         10/25/18 1535  97 8 °F  62  18   180/90    Sitting   10/25/18 0800  --  --  --  157/99    --   10/25/18 0735  98 8 °  67  16   180/92    Lying   10/24/18 1857   100 6 °  75  20  160/95    Sitting   10/24/18 1805  100 1 °  81  18  156/81    Sitting   10/24/18 1742  100 2   78  20  146/94    Sitting   10/24/18 1610  99 2 °  82  18   179/91    Lying - Orthostatic VS   10/24/18 1400  --  76  18   172/99    Lying   10/24/18 1226  --  71  20  149/92    Standing - Orthostatic VS   10/24/18 1225  --  70  18   183/101    Sitting - Orthostatic VS   10/24/18 1219  --  63  18   186/102    Lying - Orthostatic VS   10/24/18 1106  --  69  20  141/83    Standing   10/24/18 1105  --  67  20  158/87    Sitting   10/24/18 1104  --  64  20  137/78    Lying   10/24/18 1045  --  --  --  --    --       Abnormal Labs/Diagnostic Test ResultsWBC = 13 40, ANC = 11 80    MRI brain: no acute stroke    CTA head and neck: No large vessel occlusion or intracranial aneurysm identified  Mild atherosclerotic calcification at the carotid bifurcation without significant stenosis    Chest x-ray: no acute cardiopulmonary disease      EKG: Ventricular Rate BPM 66    Atrial Rate BPM 66    MS Interval ms 144    QRSD Interval ms 116    QT Interval ms 448    QTC Interval ms 469    P Axis degrees 55    QRS Axis degrees 0    T Wave Axis degrees 51      Normal sinus rhythm  Incomplete right bundle branch block  Borderline ECG  When compared with ECG of 26-AUG-2018 16:49,  Incomplete right bundle branch block is now Present          ED Treatment:   Medication Administration from 10/24/2018 1013 to 10/24/2018 1615       Date/Time Order Dose Route Action Action by Comments     10/24/2018 1213 sodium chloride 0 9 % bolus 500 mL 0 mL Intravenous Stopped Kiara Solo RN      10/24/2018 1050 sodium chloride 0 9 % bolus 500 mL 500 mL Intravenous [facility] [address] [place] [address]      10/24/2018 1152 iohexol (OMNIPAQUE) 350 MG/ML injection (MULTI-DOSE) 100 mL 100 mL Intravenous Given Tony Bridges      10/24/2018 1417 aspirin chewable tablet 324 mg 324 mg Oral Given Uziel Garner RN         Past Medical/Surgical History:    Active Ambulatory Problems     Diagnosis Date Noted    Anemia 05/21/2014    Benign essential HTN 05/21/2014    CAD (coronary artery disease), native coronary artery 05/13/2015    Mixed hyperlipidemia 05/13/2015    History of colon cancer 05/13/2015    Elevated serum creatinine 07/09/2016    Leukocytosis 07/08/2016    Hypokalemia 07/08/2016     Resolved Ambulatory Problems     Diagnosis Date Noted    No Resolved Ambulatory Problems     Past Medical History:   Diagnosis Date    Anemia, iron deficiency     Appendicitis     Cardiac disease     Colon cancer ([facility] [address] ) 12/2013    Congenital nystagmus     Hyperlipidemia     Hypertension     Inguinal hernia     Sciatica 2010       Admitting Diagnosis: TIA (transient ischemic attack) [G45 9]  Dizziness [R42]  Leukocytosis [D72 829]  Syncope [R55]  Dysmetria [R27 8]  Nystagmus [H55 00]    Age/Sex: 80 y o  male    Assessment/Plan:     TIA (transient ischemic attack)   Assessment & Plan     Patient woke up this morning in his usual state of health  He was having an episode loose stool and then passed out in the kitchen near the refrigerator  When he woke up he saw vomited on himself  He had another few episodes of nausea vomiting and diarrhea  He denied any chest pain or palpitations during the episode or any weakness of any part of his body  He states that he has never had an episode like this before  He has not had any recent medication changes  Any came to the emergency room his NIH stroke scale was 1 secondary to some nystagmus  On my exam his NIH stroke scale is 0  He is not a candidate for tP A  CT brain and CTA were negative for acute bleed or occlusion  Will proceed with doing an MRI of his brain tomorrow and also 2D echo to evaluate LV function  Neurology and Cardiology will be consulted      * Syncopal episodes   Assessment & Plan     Patient has syncopal episode today  It was accompanied by nausea vomiting and diarrhea  We will check orthostatic vitals and also check a 2D echo in keep him on a telemetry monitor to rule out arrhythmia or any structural heart disease  Will also ask Cardiology for evaluation       Mixed hyperlipidemia   Assessment & Plan     Continue statin therapy  Check lipid panel in the morning      CAD (coronary artery disease), native coronary artery   Assessment & Plan     Patient has coronary artery disease  Will continue aspirin, statin, beta-blocker and lisinopril  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  No evidence of ischemia on EKG      Benign essential HTN   Assessment & Plan     Patient's blood pressure is currently controlled  Will resume his home blood pressure medications with holding parameters          VTE Pharmacologic Prophylaxis:   Pharmacologic: Pharmacologic VTE Prophylaxis contraindicated due to low risk  Mechanical VTE Prophylaxis in Place:  Yes     Patient Centered Rounds: I have performed bedside rounds with nursing staff today      Discussions with Specialists or Other Care Team Provider:  Discussed with ER provider     Education and Discussions with Family / Patient:  Discussed with patient at bedside about his hospital course      Time Spent for Care: 45 minutes  More than 50% of total time spent on counseling and coordination of care as described above      Current Length of Stay: 0 day(s)     Current Patient Status: Inpatient     Certification Statement: The patient will continue to require additional inpatient hospital stay due to tia    Admission Orders: continuous cardiac monitoring, neuro checks Q1H x 4, Q2H x 4, then Q4H, NIH stroke scale, Neurology consult, ECHO, sequential compression device, Cardiology consult, PT/OT eval and treat       Scheduled Meds:   Current Facility-Administered Medications:  acetaminophen 650 mg Oral Q6H PRN   amLODIPine 2 5 mg Oral BID   aspirin 81 mg Oral Daily   atorvastatin 40 mg Oral QPM   folic acid 765 mcg Oral Daily   [START ON 10/26/2018] lisinopril 20 mg Oral Daily   metoprolol tartrate 25 mg Oral Q12H ISIDRO   ondansetron 4 mg Intravenous Q6H PRN   potassium chloride 20 mEq Oral TID With Meals     Continuous Infusions:   sodium chloride 125 mL/hr Last Rate: 125 mL/hr (10/25/18 0916)     PRN Meds:   acetaminophen    ondansetron

## 2018-10-25 NOTE — ASSESSMENT & PLAN NOTE
Mild, possibly due to GI losses were described episode of large loose bowel movement and vomiting  Has not recurred during admission  Oral replacement  Discharge on KCl 20 mEq daily  Check BMP on 10/29

## 2018-10-25 NOTE — NURSING NOTE
Pt  Given AVS  Per request will go over AVS with Pt  And son when he arrives  Pt  IV site DC  WNL  Removed from tele monitor  Will continue to monitor until DC

## 2018-10-25 NOTE — CONSULTS
Assessment/Plan     Assessment:  1  An episode of loss of consciousness  Given the surrounding         symptomatic issues I would agree with Cardiology that this episode most likely represented a vasovagal syncopal event  No support here for an epileptogenic origin  Not at all typical for a cerebrovascular ischemic event and no evident acute event noted on follow-up brain MRI  2   Nystagmus, by historical account old and unassociated with his presenting event  Plan:  1  Appropriate supportive measures  2   Discussed with Dr Kandy Scott  Neurology remains available to advise        Reason for Consultation:   Dizziness and syncope  HPI:   Suze Cobian is a 80 y o  right handed male who presents with an episode of loss of consciousness  Patient relates that he cameron yesterday morning feeling well  However, shortly thereafter he had a sense of bowel urgency  He made his way to the bathroom where he had apparently a copious amount of diarrheal stool  He then stood up and as he moved he recognized the fact that he was [employer]    By dizziness he described not a feeling of spinning or vertigo but more a sense of intense lightheadedness  At that point he apparently did lose consciousness  There was no associated tongue bite or urinary incontinence  However, he apparently did vomit  Afterwards he again required the use of the bathroom for on this occasion both diarrhea and vomiting  Admission head CT was unremarkable for any acute event  Head and neck CT aids were unrevealing as well  A follow-up brain MRI just completed  On initial personal review and follow-up formal report from Radiology, no evidence to support an acute stroke event  He denies any prior history of similar episodes  He has no past history of seizure, stroke or TIA  Interestingly, he does have an apparent history of chronic nystagmus which is not new to this current event         Review of Systems   Constitutional: Negative  HENT: Negative  Eyes:        Known chronic nystagmus  Respiratory: Negative  Cardiovascular: Negative  Gastrointestinal: Positive for diarrhea, nausea and vomiting  Endocrine: Negative  Genitourinary: Negative  Musculoskeletal:        Generalized arthritis of the hands  Skin: Negative for rash  Allergic/Immunologic: Negative  Neurological:        As above  Hematological: Negative  Psychiatric/Behavioral: Negative              Historical Information     Medical History        Past Medical History:   Diagnosis Date    Anemia, iron deficiency       Drug Therapy, poor absorbtion documented    Appendicitis      Cardiac disease      Colon cancer ([facility] [address] ) 12/2013     Resected: BREANNA    Congenital nystagmus       Left    Hyperlipidemia      Hypertension      Inguinal hernia      Sciatica 2010     resolved from problem list-2011         Surgical History         Past Surgical History:   Procedure Laterality Date    APPENDECTOMY         Appendicitis    CHOLECYSTECTOMY        COLON SIGMOID RESECTION   12/2013    COLON SURGERY        CORONARY ARTERY BYPASS GRAFT         x 2    CORONARY ARTERY BYPASS GRAFT         x2    CORONARY ARTERY BYPASS GRAFT        CORONARY ARTERY BYPASS GRAFT        INGUINAL HERNIA REPAIR        SHOULDER ARTHROCENTESIS Right       sub-acromial bursa area            Social History            Family History   Problem Relation Age of Onset    Hypertension Father      Heart disease Father      Heart disease Mother      Cancer Sister      Cancer Brother                 Allergies   Allergen Reactions    Pregabalin         Other reaction(s): M/S CHANGE          PTA meds:   Prior to Admission Medications   Prescriptions Last Dose Informant Patient Reported? Taking?    Multiple Vitamins-Minerals (MULTIVITAL-M PO) Not Taking at Unknown time   Yes No   Sig: Take 1 tablet by mouth   NITROGLYCERIN ER PO Not Taking at Unknown time   Yes No   Sig: place 1 tablet by sublingual route at the 1st sign of attack; may repeat every 5 min until relief; if pain persists after 3 tablets in 15 min, prompt medical attention is recommended   aspirin 81 mg chewable tablet 10/24/2018 at Unknown time   Yes Yes   Sig: Chew 81 mg   folic acid (FOLVITE) 976 MCG tablet 10/23/2018 at Unknown time   Yes Yes   Sig: Take 400 mcg by mouth daily   lisinopril-hydrochlorothiazide (PRINZIDE,ZESTORETIC) 20-12 5 MG per tablet 10/23/2018 at Unknown time   Yes Yes   Sig: Take 1 tablet by mouth daily   metoprolol tartrate (LOPRESSOR) 50 mg tablet 10/23/2018 at Unknown time   Yes Yes   Sig: Take 25 mg by mouth every 12 (twelve) hours   omeprazole (PriLOSEC) 20 mg delayed release capsule 10/23/2018 at Unknown time   Yes Yes   Sig: Take 20 mg by mouth daily   simvastatin (ZOCOR) 20 mg tablet 10/23/2018 at Unknown time   Yes Yes   Sig: Take 20 mg by mouth daily at bedtime      Facility-Administered Medications: None               Objective      Vitals:Blood pressure 170/81, pulse 58, temperature 98 3 °F (36 8 °C), temperature source Temporal, resp  rate 18, height 5' 10" (1 778 m), weight 74 9 kg (165 lb 2 oz), SpO2 98 %      Physical Exam  Head normocephalic  Eyes nonicteric  No audible anterior neck bruits  Lungs clear to auscultation  Rhythm regular with systolic murmur appreciated  No significant lower extremity edema  Neurologic Exam  Alert  Pleasantly and appropriately conversational   Fully oriented  No obvious dysarthria  No evident symbolic language difficulties in general conversation  Unremarkable spontaneous gait  Romberg maneuver performed unremarkably  Cranial Nerves:   I: Olfactory sense intact bilaterally  II: Visual fields full to confrontation  Pupils equal, round, reactive to light with normal accomodation  Fundi with bilaterally marginated discs       III,IV,VI: Extraocular muscles EOMI, with evidence of a heart resolved total beating nystagmus in right lateral gaze (as per patient a chronic presence)  V: Masseter and pterygoid strength  Sensation in the V1 through V3 distributions intact to pinprick and light touch bilaterally  VII:  Mild right lower facial asymmetry noted  VIII:   Unable to appreciate finger rub bilaterally  IX/X: Uvula midline  Soft palate elevation symmetric  XI: Trapezius and SCM strength 5/5 bilaterally  XII: Tongue midline with no atrophy or fasciculations with appropriate movement  Accurate with finger-to-nose and heel-to-shin maneuvers bilaterally  Essentially full symmetrical strength throughout the 4 extremities with no upper extremity drift  With sensory testing, patient did relate a modest diminished appreciation of pin in the distal lower extremities bilaterally  Position sense maintained in the distal 4 extremities  Muscle stretch reflexes bilaterally 1 throughout the upper extremities and bilaterally trace to absent at the knees and ankles  Toe responses downgoing bilaterally         Lab Results:   CBC:   Results from last 7 days  Lab Units 10/25/18  0500 10/24/18  1050   WBC Thousand/uL 9 10 13 40*   RBC Million/uL 3 93* 4 47*   HEMOGLOBIN g/dL 13 0* 14 6   HEMATOCRIT % 37 7* 43 2   MCV fL 96 97   PLATELETS Thousands/uL 160 167   , BMP/CMP:   Results from last 7 days  Lab Units 10/25/18  0500 10/24/18  1050   SODIUM mmol/L 140 140   POTASSIUM mmol/L 3 5* 4 1   CHLORIDE mmol/L 109* 104   CO2 mmol/L 23 26   BUN mg/dL 16 27*   CREATININE mg/dL 0 83 0 98   CALCIUM mg/dL 8 0* 8 8   AST U/L  --  34   ALT U/L  --  35   ALK PHOS U/L  --  103   EGFR ml/min/1 73sq m 80 70   , Lipid Profile:   Results from last 7 days  Lab Units 10/25/18  0500   HDL mg/dL 24*   LDL CALC mg/dL 36   TRIGLYCERIDES mg/dL 108      Imaging Studies: I have personally reviewed pertinent reports     and I have personally reviewed pertinent films in PACS     Counseling / Coordination of Care  I spent a total of 40 min with the patient with greater than 50% of that time spent counseling and coordinating his care, specifically discussing his diagnosis, additional tests, and discussing the case with his care team, as detailed above

## 2018-10-25 NOTE — ASSESSMENT & PLAN NOTE
The episode of loss of consciousness appears to be a vasovagal origin and related to possible mild dehydration due to an episode loose stools and nausea/vomiting rather than TIA  Stroke workup was initiated and negative  CTA noted for atherosclerotic disease but no evidence of thrombus, MRI negative for stroke, 2D echo unremarkable  Continue home regimen with aspirin statin and other cardiac medications  Blood pressure medication was adjusted  Patient functional status is independent, no further therapies with PT OT recommended  Follow-up with PCP in 1 week

## 2018-10-25 NOTE — PHYSICAL THERAPY NOTE
PHYSICAL THERAPY EVALUATION    Time In: 14:45  Time Out: 15:05  Total Time: 20 min  MRN: [id number]    Patient is an 80 y o  male evaluated by Physical Therapy s/p admit to [address] [place] [address] on 10/24/2018 with admitting diagnosis(es) of: TIA (transient ischemic attack) [G45 9]  Dizziness [R42]  Leukocytosis [D72 829]  Syncope [R55]  Dysmetria [R27 8]  Nystagmus [H55 00] and with principal problem(s) of: Syncopal episodes  Patient Active Problem List   Diagnosis    Anemia    Benign essential HTN    CAD (coronary artery disease), native coronary artery    Mixed hyperlipidemia    History of colon cancer    Elevated serum creatinine    Leukocytosis    Hypokalemia    Syncopal episodes    TIA (transient ischemic attack)     Please refer to H & P for further details  Past Medical History:   Diagnosis Date    Anemia, iron deficiency     Drug Therapy, poor absorbtion documented    Appendicitis     Cardiac disease     Colon cancer ([address] ) 12/2013    Resected: BREANNA    Congenital nystagmus     Left    Hyperlipidemia     Hypertension     Inguinal hernia     Sciatica 2010    resolved from problem list-2011       Past Surgical History:   Procedure Laterality Date    APPENDECTOMY      Appendicitis    CHOLECYSTECTOMY      COLON SIGMOID RESECTION  12/2013    COLON SURGERY      CORONARY ARTERY BYPASS GRAFT      x 2    CORONARY ARTERY BYPASS GRAFT      x2    CORONARY ARTERY BYPASS GRAFT      CORONARY ARTERY BYPASS GRAFT      INGUINAL HERNIA REPAIR      SHOULDER ARTHROCENTESIS Right     sub-acromial bursa area     Current Length Of Hospital Stay: 1 day(s)    PT was consulted to assess patient's functional mobility and discharge needs  Ordered are PT Evaluation and treatment with activity level of: activity as tolerated  Chart reviewed  RN cleared patient for PT       Comorbidities affecting patient's physical performance at time of assessment include: CAD, HTN and cancer history and/or treatment  Personal factors affecting the patient at time of Initial Evaluation include: limited home support, history of fall(s) and visual impairments  Please locate the subjective and objective findings outlined below:   10/25/18 8090   Note Type   Note type Eval only   Pain Assessment   Pain Assessment No/denies pain   Pain Score No Pain   Home Living   Type of Home Apartment; Other (Comment)  (States 1st fl apt, 0 [address])   Home Layout One level   Bathroom Shower/Tub Walk-in shower   Bathroom Toilet Raised   Bathroom Equipment Shower chair;Built-in shower seat   Home Equipment Other (Comment)  (None per patient)   Additional Comments Patient states he was independent with ambulation without an assistive device  States he sleeps on a regular bed  Prior Function   Level of [place] Independent with ADLs and functional mobility   Lives With Spouse   ADL Assistance Independent   IADLs Independent  (States he does the cleaning, laudry & shopping)   Falls in the last 6 months 1 to 4  (1, this current admission)   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[employer])   Restrictions/Precautions   [employer] Precautions Per Order No   Other Precautions Fall Risk;Telemetry; Visual impairment   General   Family/Caregiver Present No   Cognition   Overall Cognitive Status WFL   Arousal/Participation Alert   Orientation Level Oriented X4   Following Commands Follows all commands and directions without difficulty   RLE Assessment   RLE Assessment WFL  (Hip flex 4+/5, knee ext & ankle DF 5/5)   LLE Assessment   LLE Assessment WFL  (Hip flex 4+/5, knee ext & ankle DF 5/5)   Coordination   Movements are Fluid and Coordinated 0   Coordination and Movement Description Slight tremor of hands noted   Transfers   Sit to Stand 7  Independent   Additional items Armrests   Stand to Sit 7  Independent   Additional items Armrests   Ambulation/Elevation   Gait pattern (Decreased lakesha)   Gait Assistance 7  Independent   Additional items Other (Comment)  (Able to turn, look back without LOB)   Assistive Device None   Distance (>200 ft)   Balance   Static Standing Good   Dynamic Standing (Fair (+) to Good)   Endurance Deficit   Endurance Deficit No   Activity Tolerance   Activity Tolerance Patient tolerated treatment well   Assessment   Problem List Decreased mobility; Impaired vision   Assessment In summary, guiding factors including patient history, examination of body system(s), clinical presentation and clinical decision making were considered  Patient presents with comorbid conditions that impact function and limited physical support  Patient also presents with impaired vital sign response  Clinical presentation is stable and/or uncomplicated at this time  The assigned level of complexity is: low  PT not indicated at this time as patient at independent level of functional mobility  D/C PT; reorder PT if patient's functional status changes or declines  From PT/mobility standpoint, preliminary discharge recommendation would be: home independently with wife, in order to facilitate increased independence in home environment, return to apartment and return to community activities     Barriers to Discharge Decreased caregiver support   Goals   Patient Goals "I'll be glad to get out of here!"   LTG Expiration Date (N/A)   Long Term Goal #1 N/A   Treatment Day 0   Plan   Treatment/Interventions Patient/family training;Spoke to MD   PT Frequency One time visit   Recommendation   Recommendation Home independently   Equipment Recommended Other (Comment)  (None at this time)   PT - OK to Discharge (When medically cleared)   Barthel Index   Feeding 10   Bathing 5   Grooming Score 5   Dressing Score 10   Bladder Score 10   Bowels Score 10   Toilet Use Score 10   Transfers (Bed/Chair) Score 15   Mobility (Level Surface) Score 15   Stairs Score 5   Barthel Index Score 95     Vitals: Seated post-ambulation and right UE BP = 184/96, Heart Rate = 62 bpm, SpO2 = % on RA  Kera RN made aware  Patient returned to recliner chair at bedside; patient positioned with all needs, including call bell, within reach      Francisco Javier Arevalo, PT, DPT

## 2018-10-25 NOTE — ASSESSMENT & PLAN NOTE
Patient has coronary artery disease status post 2 vessel coronary artery bypass grafting in 2002  Has been stable on same medical regimen  Stress test done within 2 years per patient was normal  Patient follows with [facility] Cardiology  Continue continue aspirin, statin, beta-blocker and lisinopril    Amlodipine 5 mg b i d  added for improved blood pressure control

## 2018-10-25 NOTE — PROGRESS NOTES
Pharmacist did medication education with patient  Discussed medication indications, dose times, and side effects of metoprolol, lisinopril, hydrochlorothiazide, and atorvastatin

## 2018-10-25 NOTE — DISCHARGE SUMMARY
Discharge- Anna Simms 5/14/1932, 80 y o  male MRN: [id number]    Unit/Bed#:  Encounter: [id number]    Primary Care Provider: Christophe Dixon MD   Date and time admitted to hospital: 10/24/2018 10:14 AM        * Syncopal episodes   Assessment & Plan    Suspected to be vasovagal in setting of an episode of large loose bowel movement and nausea/vomiting with mild dehydration  Patient did have a mild hypokalemia  Patient is not orthostatics  CTA/MRI unremarkable other than mild atherosclerosis  No events on telemetry  Discussed with Cardiology, 2D echo unremarkable  Patient asymptomatic during PT/OT and per PT OT evaluation did not require additional therapy  Appreciate Cardiology and Neurology input     TIA (transient ischemic attack)   Assessment & Plan    The episode of loss of consciousness appears to be a vasovagal origin and related to possible mild dehydration due to an episode loose stools and nausea/vomiting rather than TIA  Stroke workup was initiated and negative  CTA noted for atherosclerotic disease but no evidence of thrombus, MRI negative for stroke, 2D echo unremarkable  Continue home regimen with aspirin statin and other cardiac medications  Blood pressure medication was adjusted  Patient functional status is independent, no further therapies with PT OT recommended  Follow-up with PCP in 1 week     Benign essential HTN   Assessment & Plan    Patient remains hypertensive throughout the day  Will adjust medications - continue metoprolol and lisinopril, d/c HCTZ and add amlodipine 5 mg BID  This, however, is unlikely to be related to the syncopal episode     CAD (coronary artery disease), native coronary artery   Assessment & Plan    Patient has coronary artery disease status post 2 vessel coronary artery bypass grafting in 2002  Has been stable on same medical regimen  Stress test done within 2 years per patient was normal  Patient follows with [facility]  Continue continue aspirin, statin, beta-blocker and lisinopril  Amlodipine 5 mg b i d  added for improved blood pressure control     Hypokalemia   Assessment & Plan    Mild, possibly due to GI losses were described episode of large loose bowel movement and vomiting  Has not recurred during admission  Oral replacement  Discharge on KCl 20 mEq daily  Check BMP on 10/29     Dyslipidemia   Assessment & Plan    LDL controlled, low HDL  Continue statin therapy       Discharging Physician / Practitioner: Ita Chen MD  PCP: Richard Olsen MD  Admission Date:   Admission Orders     Ordered        10/24/18 1326  Inpatient Admission (expected length of stay for this patient is greater than two midnights)  Once             Discharge Date: 10/25/18    Resolved Problems  Date Reviewed: 10/25/2018    None          Consultations During Hospital Stay:  · Neurology  · Cardiology    Procedures Performed:     MRI brain wo contrast   Final Result by May Thapa MD (10/25 5831)      1  No acute stroke or other intracranial acute MRI findings  2   Age appropriate cerebral atrophy and mild chronic microangiopathic changes of the brain  Workstation performed: [id number]         CTA head and neck with and without contrast   Final Result by Aidti Barrientos DO (10/24 1306)      No large vessel occlusion or intracranial aneurysm identified  Mild atherosclerotic calcification at the carotid bifurcation without significant stenosis  Workstation performed: [id number]         XR chest 2 views   Final Result by Winsome Dave MD (10/24 7156)      No acute cardiopulmonary disease  Workstation performed: [id number]           · 2D Echo    SUMMARY     LEFT VENTRICLE:  Normal left ventricular cavity size, mildly increased wall thickness, normal left ventricle systolic function and wall motion  Ejection fraction is estimated as around 60-65%   Normal diastolic function      RIGHT VENTRICLE:  Normal right ventricular size and systolic function  Normal estimated right ventricular systolic pressure      LEFT ATRIUM:  Normal left atrial cavity size  Intact interatrial septum      RIGHT ATRIUM:  Normal right atrial cavity size      MITRAL VALVE:  Trace mitral regurgitation      AORTIC VALVE:  Tricuspid aortic valve with mild sclerosis  No aortic valve stenosis or regurgitation      TRICUSPID VALVE:  Trace tricuspid valve regurgitation      PULMONIC VALVE:  No significant pulmonic valve regurgitation      AORTA:  Aortic root and proximal ascending aorta are normal in size on 2D imaging      IVC, HEPATIC VEINS:  Inferior vena cava is normal in size and demonstrates appropriate respiratory phasic changes in diameter      PERICARDIUM:  No pericardial effusion      HISTORY: PRIOR HISTORY: Hypertension, coronary artery disease, syncope, transient ischemic attack      PROCEDURE: The study was performed in the [address]  This was a routine study  The transthoracic approach was used  The study included complete 2D imaging, M-mode, complete spectral Doppler, and color Doppler  The heart rate was  61 bpm, at the start of the study  Images were obtained from the parasternal, apical, subcostal, and suprasternal notch acoustic windows  Image quality was adequate      LEFT VENTRICLE: Normal left ventricular cavity size, mildly increased wall thickness, normal left ventricle systolic function and wall motion  Ejection fraction is estimated as around 60-65%  Normal diastolic function      RIGHT VENTRICLE: Normal right ventricular size and systolic function  Normal estimated right ventricular systolic pressure      LEFT ATRIUM: Normal left atrial cavity size  Intact interatrial septum      RIGHT ATRIUM: Normal right atrial cavity size      MITRAL VALVE: Trace mitral regurgitation      AORTIC VALVE: Tricuspid aortic valve with mild sclerosis   No aortic valve stenosis or regurgitation      TRICUSPID VALVE: Trace tricuspid valve regurgitation      PULMONIC VALVE: No significant pulmonic valve regurgitation      PERICARDIUM: No pericardial effusion      AORTA: Aortic root and proximal ascending aorta are normal in size on 2D imaging      SYSTEMIC VEINS: IVC: Inferior vena cava is normal in size and demonstrates appropriate respiratory phasic changes in diameter        Significant Findings / Test Results:       Results from last 7 days  Lab Units 10/25/18  0500   WBC Thousand/uL 9 10   HEMOGLOBIN g/dL 13 0*   HEMATOCRIT % 37 7*   PLATELETS Thousands/uL 160       Results from last 7 days  Lab Units 10/25/18  0500   SODIUM mmol/L 140   POTASSIUM mmol/L 3 5*   CHLORIDE mmol/L 109*   CO2 mmol/L 23   BUN mg/dL 16   CREATININE mg/dL 0 83   CALCIUM mg/dL 8 0*       Incidental Findings:   · None     Test Results Pending at Discharge (will require follow up): · None     Outpatient Tests Requested:  · [facility] 16/15    Complications:  None    Reason for Admission: syncopal episode    Hospital Course:     Farooq Rodriguez is a 80 y o  male patient with history of coronary artery disease status post 2 vessel CABG, hypertension, dyslipidemia who originally presented to the hospital on 10/24/2018 due to a syncopal episode experienced at home  The patient described the episode as proceeding with a large loose bowel movement, felt lightheaded and passed out for unknown period of time, woke up with vomitus on himself  The patient was admitted for TIA/stroke workup  A CTA of the brain was noted for atherosclerosis at bifurcation of carotid arteries otherwise was unremarkable  An MRI of the brain was negative for stroke  A 2D echo did not reveal significant findings  The patient was noted for elevated blood pressure for which his medications were adjusted as above  He has borderline low potassium was also replaced  The patient was evaluated by Cardiology, Neurology and Physical and Occupational therapy    The patient remained asymptomatic throughout his hospitalization with normal exam   It was suspected, that the patient's syncopal episode was related to a vasovagal episode rather than a neurologic or cardiac origin  The patient is to follow up with his primary care provider  Other than blood pressure medication adjustments, no other changes to patient care were made  The patient was noted to have excellent functional capacity  Please see above list of diagnoses and related plan for additional information  Condition at Discharge: good     Discharge Day Visit / Exam:     Subjective:  Patient seen and examined  He reports feeling well and has no complaints  He denies recurrence of loose stools or nausea or vomiting  He denies abdominal pain  He is reporting good appetite  He denies chest pain, shortness of breath or palpitations  He denies lightheadedness or dizziness  He was able to ambulate with very staff members including PT and OT  Vitals: Blood Pressure: 142/78 (10/25/18 1623)  Pulse: 62 (10/25/18 1535)  Temperature: 97 8 °F (36 6 °C) (10/25/18 1535)  Temp Source: Temporal (10/25/18 1535)  Respirations: 18 (10/25/18 1535)  Height: 5' 10" (177 8 cm) (10/24/18 1610)  Weight - Scale: 74 9 kg (165 lb 2 oz) (10/25/18 0500)  SpO2: 98 % (10/25/18 1113)    Exam:     Physical Exam   Constitutional: He is oriented to person, place, and time  No distress  HENT:   Head: Normocephalic and atraumatic  Eyes: Conjunctivae are normal    Neck: No JVD present  Cardiovascular: Normal rate and regular rhythm  No murmur heard  Pulmonary/Chest: Effort normal  No respiratory distress  He has no wheezes  He has no rales  Abdominal: Soft  He exhibits no distension  There is no tenderness  There is no guarding  Musculoskeletal: He exhibits no edema  Neurological: He is alert and oriented to person, place, and time  Skin: Skin is warm and dry  Psychiatric: He has a normal mood and affect         Discussion with Family:   Attempted to contact wife unsuccessfully  Unable to leave a message  Discharge instructions/Information to patient and family:   See after visit summary for information provided to patient and family  Provisions for Follow-Up Care:  See after visit summary for information related to follow-up care and any pertinent home health orders  Disposition:     Home    For Discharges to [facility] SNF:   · Not Applicable to this Patient - Not Applicable to this Patient    Planned Readmission:   No     Discharge Statement:  I spent 35 minutes discharging the patient  This time was spent on the day of discharge  I had direct contact with the patient on the day of discharge  Greater than 50% of the total time was spent examining patient, answering all patient questions, arranging and discussing plan of care with patient as well as directly providing post-discharge instructions  Additional time then spent on discharge activities  Discharge Medications:  See after visit summary for reconciled discharge medications provided to patient and family        ** Please Note: This note has been constructed using a voice recognition system **

## 2018-10-25 NOTE — CONSULTS
Consultation - PMR   Noreen Warren 80 y o  male MRN: [id number]  Unit/Bed#:  Encounter: [id number]    Assessment/Plan     Assessment:  Syncope with fall  Peripheral polyneuropathy  Hypertension  Plan:  Regarding rehabilitation therapies, the patient did participate in evaluation with physical therapy and demonstrated his independence in transfers and walking  Similarly, on my evaluation he was independent with standing from the chair and marching in place without loss of balance or stumbling, using no upper limb assistive device  It does not appear that rehabilitation therapies are indicated now  Education regarding safety and fall prevention can be helpful  Some diminished joint position sense was noted in the 1st toes bilaterally on examination  The patient denies difficulties with ambulation including tripping or falling or stumbling  Education regarding awareness of this abnormality will be helpful  Hypertension management is ongoing by the primary team     History of Present Illness   HPI:  Noreen Warren is a 80 y o  male who presents with a syncopal episode  He reports that yesterday morning, after eating breakfast and having a bowel movement, he suffered an episode of syncope and nausea  He states that he felt lightheaded but not dizzy  There is no chest pain or shortness of breath associated  There is no loss of continence  He was taken to the hospital where evaluation has not revealed definite cause  Brain imaging does not reveal acute hemorrhagic or ischemic abnormality  Labs revealed mild leukocytosis and mild elevation of BUN  The patient denies any similar episodes in the past    On my visit today, he reports feeling well with no recurrence of the syncopal episode  He denies any lateralized weakness or sensory loss  He denies any visual changes or difficulties with chewing or swallowing     He denies any chest pain or shortness of breath or lightheadedness or dizziness or nausea or vomiting or other abnormalities now  He states that he feels well and that when cleared medically, he hopes to go home soon  He reports that he did participate in the physical therapy evaluation and was able to perform tasks normally without difficulty  Inpatient consult to Physical Medicine Rehab  Consult performed by: Hiram Hassan  Consult ordered by: Authur Sever          Review of Systems   Constitutional: Negative for activity change, appetite change, chills and diaphoresis  HENT: Negative for drooling and trouble swallowing  Eyes: Negative for photophobia and visual disturbance  Respiratory: Negative for choking and shortness of breath  Cardiovascular: Negative for chest pain and leg swelling  Gastrointestinal: Negative for abdominal distention and abdominal pain  Endocrine: Negative for polyphagia and polyuria  Genitourinary: Negative for difficulty urinating and dysuria  Musculoskeletal: Negative for arthralgias and gait problem  Skin: Negative for color change and pallor  Neurological: Positive for light-headedness  Negative for dizziness  Psychiatric/Behavioral: Negative for confusion         Historical Information   Past Medical History:   Diagnosis Date    Anemia, iron deficiency     Drug Therapy, poor absorbtion documented    Appendicitis     Cardiac disease     [facility]    Resected: BREANNA    Congenital nystagmus     Left    Hyperlipidemia     Hypertension     Inguinal hernia     Sciatica 2010    resolved from problem list-2011     Past Surgical History:   Procedure Laterality Date    APPENDECTOMY      Appendicitis    CHOLECYSTECTOMY      COLON SIGMOID RESECTION  12/2013    COLON SURGERY      CORONARY ARTERY BYPASS GRAFT      x 2    CORONARY ARTERY BYPASS GRAFT      x2    CORONARY ARTERY BYPASS GRAFT      CORONARY ARTERY BYPASS GRAFT      INGUINAL HERNIA REPAIR      SHOULDER ARTHROCENTESIS Right     sub-acromial [facility]     Social History   History   Alcohol Use No     History   Drug Use No     History   Smoking Status    Never Smoker   Smokeless Tobacco    Never Used       Home Setup:   He lives in a single floor apartment with his wife  Functional Status Prior to Admission:   He was independent with bathing and dressing and toileting and walking using no upper limb assistive device  He can ascend and descend stairs independently  He drives a car  Functional Status on Admission:   Generally independent but requiring evaluation for the syncopal episode    Family History   Problem Relation Age of Onset    Hypertension Father     Heart disease Father     Heart disease Mother     Cancer Sister     Cancer Brother        Meds/Allergies   all current active meds have been reviewed  Allergies   Allergen Reactions    Pregabalin      Other reaction(s): M/S CHANGE       Objective   Vitals: Blood pressure 142/78, pulse 62, temperature 97 8 °F (36 6 °C), temperature source Temporal, resp  rate 18, height 5' 10" (1 778 m), weight 74 9 kg (165 lb 2 oz), SpO2 98 %  Invasive Devices          No matching active lines, drains, or airways          Physical Exam   Constitutional: He appears well-developed and well-nourished  No distress  HENT:   Head: Normocephalic and atraumatic  Eyes: Conjunctivae and EOM are normal    Neck: Normal range of motion  Neck supple  Cardiovascular: Normal rate and regular rhythm  Pulmonary/Chest: Effort normal and breath sounds normal  No respiratory distress  He has no wheezes  Abdominal: Soft  Bowel sounds are normal  There is no tenderness  Musculoskeletal: Normal range of motion  He exhibits no edema or deformity  Neurological:   On neurologic examination, the cognitive and cranial nerve portions are unremarkable except for mild horizontal nystagmus not associated with symptoms  There is no significant visual field cut or neglect  There is no facial weakness    Tongue is midline on protrusion  Thought content and speech/language  are unremarkable  Motor strength is generally preserved bilaterally in the upper lower limbs  Sensation is present throughout but with mild decrease in joint position sense in the feet  Coordination is intact for heel-to-shin and finger to chin testing  Muscle stretch reflexes are unremarkable  Functionally, he is able to stand independently from the armchair  He could march independently in place without loss of balance or lightheadedness or other difficulties  Skin: Skin is warm and dry  He is not diaphoretic  Psychiatric: He has a normal mood and affect  Nursing note and vitals reviewed  Lab Results: I have personally reviewed pertinent lab results  Imaging: I have personally reviewed pertinent films in PACS  EKG, Pathology, and Other Studies: I have personally reviewed pertinent reports  Code Status: Level 1 - Full Code  Advance Directive and Living Will:      Power of :    POLST:      Counseling / Coordination of Care  Total floor / unit time spent today 45 minutes  Greater than 50% of total time was spent with the patient and / or family counseling and / or coordination of care  A description of the counseling / coordination of care:   I explained that he should continue his generally active lifestyle, with awareness of his body for any lightheadedness or dizziness episodes or chest pain or shortness of breath  If they occur, I advised that he contact a medical professional promptly  He understands and agrees  Rony Manifold

## 2018-10-25 NOTE — ASSESSMENT & PLAN NOTE
Suspected to be vasovagal in setting of an episode of large loose bowel movement and nausea/vomiting with mild dehydration  Patient did have a mild hypokalemia  Patient is not orthostatics  CTA/MRI unremarkable other than mild atherosclerosis  No events on telemetry  Discussed with Cardiology, 2D echo unremarkable  Patient asymptomatic during PT/OT and per PT OT evaluation did not require additional therapy  Appreciate Cardiology and Neurology input

## 2018-10-26 ENCOUNTER — TRANSITIONAL CARE MANAGEMENT (OUTPATIENT)
Dept: FAMILY MEDICINE CLINIC | Facility: CLINIC | Age: 83
End: 2018-10-26

## 2018-10-26 NOTE — PROGRESS NOTES
Patients file reviewed : does not need tcm call or appt as the hosp issue is done and solved ; no need to schedule

## 2018-10-26 NOTE — PHYSICIAN ADVISOR
Current patient class: Inpatient  The patient is currently on Hospital Day: 2 at [address]      The patient was admitted to the hospital at 26 on 10/24/18 for the following diagnosis:  TIA (transient ischemic attack) [G45 9]  Dizziness [R42]  Leukocytosis [D72 829]  Syncope [R55]  Dysmetria [R27 8]  Nystagmus [H55 00]       There is documentation in the medical record of an expected length of stay of at least 2 midnights  The patient is therefore expected to satisfy the 2 midnight benchmark and given the 2 midnight presumption is appropriate for INPATIENT ADMISSION  Given this expectation of a satisfying stay, CMS instructs us that the patient is most often appropriate for inpatient admission under part A provided medical necessity is documented in the chart  After review of the relevant documentation, labs, vital signs and test results, the patient is appropriate for INPATIENT ADMISSION  Admission to the hospital as an inpatient is a complex decision making process which requires the practitioner to consider the patients presenting complaint, history and physical examination and all relevant testing  With this in mind, in this case, the patient was deemed appropriate for INPATIENT ADMISSION  After review of the documentation and testing available at the time of the admission I concur with this clinical determination of medical necessity  Rationale is as follows: The patient is a 80 yrs old Male who presented to the ED at 10/24/2018 10:14 AM with a chief complaint of Syncope (pt reports having a syncopal episode from standing position this morning, denies head/neck/back pain, reports vomiting/diarrea since yesterday)     Given the need for further hospitalization, and along with the documentation of medical necessity present in the chart, the patient is appropriate for inpatient admission    The patient is expected to satisfy the 2 midnight benchmark, and will require further acute medical care  The patient does have comorbid conditions which increases the risk for significant adverse outcome  Given this the patient is appropriate for inpatient admission        The patients vitals on arrival were ED Triage Vitals [10/24/18 1023]   Temperature Pulse Respirations Blood Pressure SpO2   (!) 96 °F (35 6 °C) 70 18 147/64 96 %      Temp Source Heart Rate Source Patient Position - Orthostatic VS BP Location FiO2 (%)   Tympanic Monitor Sitting Right arm --      Pain Score       No Pain           Past Medical History:   Diagnosis Date    Anemia, iron deficiency     Drug Therapy, poor absorbtion documented    Appendicitis     Cardiac disease     Colon cancer ([facility]) 12/2013    Resected: BREANNA    Congenital nystagmus     Left    Hyperlipidemia     Hypertension     Inguinal hernia     Sciatica 2010    resolved from problem list-2011     Past Surgical History:   Procedure Laterality Date    APPENDECTOMY      Appendicitis    CHOLECYSTECTOMY      COLON SIGMOID RESECTION  12/2013    COLON SURGERY      CORONARY ARTERY BYPASS GRAFT      x 2    CORONARY ARTERY BYPASS GRAFT      x2    CORONARY ARTERY BYPASS GRAFT      CORONARY ARTERY BYPASS GRAFT      INGUINAL HERNIA REPAIR      SHOULDER ARTHROCENTESIS Right     sub-acromial bursa area           Consults have been placed to:   IP CONSULT TO PHYSICAL MEDICINE REHAB  IP CONSULT TO NEUROLOGY  IP CONSULT TO CARDIOLOGY    Vitals:    10/25/18 0955 10/25/18 1113 10/25/18 1535 10/25/18 1623   BP: 168/92 170/81 (!) 180/90 142/78   BP Location:  Right arm Right arm Right arm   Pulse: 63 58 62    Resp:  18 18    Temp:  98 3 °F (36 8 °C) 97 8 °F (36 6 °C)    TempSrc:  Temporal Temporal    SpO2:  98%     Weight:       Height:           Most recent labs:    Recent Labs      10/24/18   1050  10/24/18   1421  10/25/18   0500   WBC  13 40*   --   9 10   HGB  14 6   --   13 0*   HCT  43 2   --   37 7*   PLT  167   --   160   K  4 1   --   3 5* NA  140   --   140   CALCIUM  8 8   --   8 0*   BUN  27*   --   16   CREATININE  0 98   --   0 83   LIPASE  114   --    --    INR  1 05   --    --    TROPONINI  <0 01  <0 01   --    AST  34   --    --    ALT  35   --    --    ALKPHOS  103   --    --        Scheduled Meds:  Continuous Infusions:  No current facility-administered medications for this encounter  PRN Meds:      Surgical procedures (if appropriate):

## 2018-10-26 NOTE — PROGRESS NOTES
Called and notified pt that no appointment was needed pt has a already scheduled appointment  for 12/12/18 at 1 pm

## 2018-10-27 LAB
ATRIAL RATE: 61 BPM
P AXIS: 60 DEGREES
PR INTERVAL: 152 MS
QRS AXIS: 2 DEGREES
QRSD INTERVAL: 114 MS
QT INTERVAL: 472 MS
QTC INTERVAL: 475 MS
T WAVE AXIS: 41 DEGREES
VENTRICULAR RATE: 61 BPM

## 2018-10-27 PROCEDURE — 93010 ELECTROCARDIOGRAM REPORT: CPT | Performed by: INTERNAL MEDICINE

## 2018-10-29 ENCOUNTER — APPOINTMENT (OUTPATIENT)
Dept: LAB | Facility: HOSPITAL | Age: 83
End: 2018-10-29
Attending: FAMILY MEDICINE
Payer: MEDICARE

## 2018-10-29 ENCOUNTER — TRANSCRIBE ORDERS (OUTPATIENT)
Dept: ADMINISTRATIVE | Facility: HOSPITAL | Age: 83
End: 2018-10-29

## 2018-10-29 DIAGNOSIS — E87.6 HYPOKALEMIA: ICD-10-CM

## 2018-10-29 DIAGNOSIS — E87.6 HYPOKALEMIA: Primary | ICD-10-CM

## 2018-10-29 LAB
ANION GAP SERPL CALCULATED.3IONS-SCNC: 7 MMOL/L (ref 5–14)
BUN SERPL-MCNC: 17 MG/DL (ref 5–25)
CALCIUM SERPL-MCNC: 8.2 MG/DL (ref 8.4–10.2)
CHLORIDE SERPL-SCNC: 105 MMOL/L (ref 97–108)
CO2 SERPL-SCNC: 25 MMOL/L (ref 22–30)
CREAT SERPL-MCNC: 0.95 MG/DL (ref 0.7–1.5)
GFR SERPL CREATININE-BSD FRML MDRD: 72 ML/MIN/1.73SQ M
GLUCOSE SERPL-MCNC: 102 MG/DL (ref 70–99)
POTASSIUM SERPL-SCNC: 4 MMOL/L (ref 3.6–5)
SODIUM SERPL-SCNC: 137 MMOL/L (ref 137–147)

## 2018-10-29 PROCEDURE — 36415 COLL VENOUS BLD VENIPUNCTURE: CPT

## 2018-10-29 PROCEDURE — 80048 BASIC METABOLIC PNL TOTAL CA: CPT

## 2018-11-01 ENCOUNTER — OFFICE VISIT (OUTPATIENT)
Dept: FAMILY MEDICINE CLINIC | Facility: CLINIC | Age: 83
End: 2018-11-01
Payer: MEDICARE

## 2018-11-01 VITALS
SYSTOLIC BLOOD PRESSURE: 120 MMHG | DIASTOLIC BLOOD PRESSURE: 70 MMHG | HEART RATE: 63 BPM | OXYGEN SATURATION: 97 % | BODY MASS INDEX: 24.42 KG/M2 | TEMPERATURE: 96.7 F | WEIGHT: 170.2 LBS

## 2018-11-01 DIAGNOSIS — I10 BENIGN ESSENTIAL HTN: Primary | ICD-10-CM

## 2018-11-01 DIAGNOSIS — R79.89 ELEVATED SERUM CREATININE: ICD-10-CM

## 2018-11-01 DIAGNOSIS — R55 SYNCOPE, UNSPECIFIED SYNCOPE TYPE: ICD-10-CM

## 2018-11-01 PROBLEM — G45.9 TIA (TRANSIENT ISCHEMIC ATTACK): Status: RESOLVED | Noted: 2018-10-24 | Resolved: 2018-11-01

## 2018-11-01 PROCEDURE — 99496 TRANSJ CARE MGMT HIGH F2F 7D: CPT | Performed by: FAMILY MEDICINE

## 2018-11-01 RX ORDER — AMLODIPINE BESYLATE 5 MG/1
5 TABLET ORAL DAILY
Qty: 60 TABLET | Refills: 6 | Status: SHIPPED | OUTPATIENT
Start: 2018-11-01 | End: 2018-12-18 | Stop reason: SDUPTHER

## 2018-11-01 RX ORDER — POTASSIUM CHLORIDE 20 MEQ/1
TABLET, EXTENDED RELEASE ORAL
Qty: 14 TABLET | Refills: 0 | Status: SHIPPED | OUTPATIENT
Start: 2018-11-01 | End: 2019-06-21 | Stop reason: ALTCHOICE

## 2018-11-01 NOTE — PROGRESS NOTES
Date and time hospital follow up call was made:  10/26/2018 11:53 AM  Hospital care reviewed:  Records reviewed  Patient was hopsitalized at:  [facility]  Date of admission:  10/24/18  Date of discharge:  10/25/18  Diagnosis:  Syncope; TIA  Disposition:  Home  Were the patients medicaitons reviewed and updated:  Yes  Current symptoms:  None  Post hospital issues:  None  Should patient be enrolled in anticoag monitoring?:  No  Scheduled for follow up?:  Yes  Patients specialists:  Cardiologist  Cardiologist's Name:  Dr Vinay Moreno  Did you obtain your prescribed medications:  Yes  Do you need help managing your perscriptions or medications:  No  Is transportation to your appointments needed:  No  I have advised the patient to call PCP with any new or worsening symptoms (please type in name along with any credentials):  Saeid Nuñez  Living Arrangements:  Family members  Support System:  Family  The type of support provided:  Emotional, Physical  Do you have social support:  No, not at all  Are you recieving outpatient services:  No  Are you recieving home care services:  No  Are you using any community resources:  No  Current waiver service:  No  Have you fallen in the last 12 months:  Yes  How many times:  once  Interperter language line required?:  No  Counseling:  Patient  Counseling topics:  Importance of RX compliance

## 2018-11-01 NOTE — PATIENT INSTRUCTIONS
After you finish the current potassium pills you may stop those  Otherwise continue the medications as listed  Reduce the amlodipine top one daily in am      Must drink more fluids : urine should be light colored

## 2018-11-01 NOTE — PROGRESS NOTES
Assessment/Plan:    No problem-specific Assessment & Plan notes found for this encounter  Diagnoses and all orders for this visit:    Benign essential HTN  -     potassium chloride (K-DUR,KLOR-CON) 20 mEq tablet; Use for 2 wks then stop  -     amLODIPine (NORVASC) 5 mg tablet; Take 1 tablet (5 mg total) by mouth daily    Syncope, unspecified syncope type    Elevated serum creatinine          Subjective:      Patient ID: Modesta Thomas is a 80 y o  male  Lavaun Sophie comes for post hospital visit less than 7  He was admitted for 2 days with a brief syncopal episode after having moved his bowels  This was felt to be a vasovagal syndrome workup was negative and he was discharged with the [facility] as noted            The following portions of the patient's history were reviewed and updated as appropriate: allergies, current medications, past family history, past medical history, past social history, past surgical history and problem list     Review of Systems      Objective:  Vitals:    11/01/18 1547   BP: 120/70   BP Location: Left arm   Patient Position: Sitting   Cuff Size: Adult   Pulse: 63   Temp: (!) 96 7 °F (35 9 °C)   SpO2: 97%   Weight: 77 2 kg (170 lb 3 2 oz)      Physical Exam

## 2018-11-19 DIAGNOSIS — I10 BENIGN ESSENTIAL HTN: ICD-10-CM

## 2018-11-19 RX ORDER — LISINOPRIL 20 MG/1
20 TABLET ORAL DAILY
Qty: 90 TABLET | Refills: 3 | Status: SHIPPED | OUTPATIENT
Start: 2018-11-19 | End: 2019-11-13 | Stop reason: SDUPTHER

## 2018-11-27 ENCOUNTER — TRANSCRIBE ORDERS (OUTPATIENT)
Dept: ADMINISTRATIVE | Facility: HOSPITAL | Age: 83
End: 2018-11-27

## 2018-11-27 ENCOUNTER — APPOINTMENT (OUTPATIENT)
Dept: LAB | Facility: HOSPITAL | Age: 83
End: 2018-11-27

## 2018-11-27 ENCOUNTER — APPOINTMENT (OUTPATIENT)
Dept: LAB | Facility: HOSPITAL | Age: 83
End: 2018-11-27
Attending: COLON & RECTAL SURGERY

## 2018-11-27 DIAGNOSIS — Z85.038 PERSONAL HISTORY OF COLON CANCER: ICD-10-CM

## 2018-11-27 DIAGNOSIS — Z01.84 IMMUNITY STATUS TESTING: ICD-10-CM

## 2018-11-27 DIAGNOSIS — E87.6 HYPOKALEMIA: ICD-10-CM

## 2018-11-27 DIAGNOSIS — Z01.84 IMMUNITY STATUS TESTING: Primary | ICD-10-CM

## 2018-11-27 DIAGNOSIS — Z85.038 PERSONAL HISTORY OF COLON CANCER: Primary | ICD-10-CM

## 2018-11-27 LAB
ANION GAP SERPL CALCULATED.3IONS-SCNC: 11 MMOL/L (ref 5–14)
BUN SERPL-MCNC: 17 MG/DL (ref 5–25)
CALCIUM SERPL-MCNC: 8.8 MG/DL (ref 8.4–10.2)
CEA SERPL-MCNC: 3 NG/ML (ref 0–3)
CHLORIDE SERPL-SCNC: 104 MMOL/L (ref 97–108)
CO2 SERPL-SCNC: 25 MMOL/L (ref 22–30)
CREAT SERPL-MCNC: 0.99 MG/DL (ref 0.7–1.5)
GFR SERPL CREATININE-BSD FRML MDRD: 69 ML/MIN/1.73SQ M
GLUCOSE SERPL-MCNC: 93 MG/DL (ref 70–99)
POTASSIUM SERPL-SCNC: 4.3 MMOL/L (ref 3.6–5)
RUBV IGG SERPL IA-ACNC: >175 IU/ML
SODIUM SERPL-SCNC: 140 MMOL/L (ref 137–147)

## 2018-11-27 PROCEDURE — 86762 RUBELLA ANTIBODY: CPT

## 2018-11-27 PROCEDURE — 36415 COLL VENOUS BLD VENIPUNCTURE: CPT

## 2018-11-27 PROCEDURE — 80048 BASIC METABOLIC PNL TOTAL CA: CPT

## 2018-11-27 PROCEDURE — 82378 CARCINOEMBRYONIC ANTIGEN: CPT

## 2018-12-18 DIAGNOSIS — I10 BENIGN ESSENTIAL HTN: ICD-10-CM

## 2018-12-18 RX ORDER — AMLODIPINE BESYLATE 5 MG/1
5 TABLET ORAL DAILY
Qty: 90 TABLET | Refills: 3 | Status: SHIPPED | OUTPATIENT
Start: 2018-12-18 | End: 2019-12-12 | Stop reason: ALTCHOICE

## 2018-12-21 ENCOUNTER — OFFICE VISIT (OUTPATIENT)
Dept: FAMILY MEDICINE CLINIC | Facility: CLINIC | Age: 83
End: 2018-12-21
Payer: MEDICARE

## 2018-12-21 VITALS
HEART RATE: 58 BPM | BODY MASS INDEX: 23.96 KG/M2 | OXYGEN SATURATION: 97 % | TEMPERATURE: 97.9 F | DIASTOLIC BLOOD PRESSURE: 80 MMHG | SYSTOLIC BLOOD PRESSURE: 150 MMHG | WEIGHT: 167 LBS

## 2018-12-21 DIAGNOSIS — M18.12 ARTHRITIS OF CARPOMETACARPAL (CMC) JOINT OF LEFT THUMB: ICD-10-CM

## 2018-12-21 DIAGNOSIS — Z85.038 HISTORY OF COLON CANCER: ICD-10-CM

## 2018-12-21 DIAGNOSIS — I25.10 CORONARY ARTERY DISEASE INVOLVING NATIVE CORONARY ARTERY OF NATIVE HEART WITHOUT ANGINA PECTORIS: ICD-10-CM

## 2018-12-21 DIAGNOSIS — R32 URINARY INCONTINENCE, UNSPECIFIED TYPE: ICD-10-CM

## 2018-12-21 DIAGNOSIS — I10 BENIGN ESSENTIAL HTN: Primary | ICD-10-CM

## 2018-12-21 DIAGNOSIS — D64.9 ANEMIA, UNSPECIFIED TYPE: ICD-10-CM

## 2018-12-21 PROCEDURE — 99214 OFFICE O/P EST MOD 30 MIN: CPT | Performed by: FAMILY MEDICINE

## 2018-12-21 NOTE — PROGRESS NOTES
Assessment/Plan:    No problem-specific Assessment & Plan notes found for this encounter  Diagnoses and all orders for this visit:    Benign essential HTN    Coronary artery disease involving native coronary artery of native heart without angina pectoris    Anemia, unspecified type    History of colon cancer    Urinary incontinence, unspecified type  -     Ambulatory referral to Urology; Future          Subjective:      Patient ID: Armida Ashby is a 80 y o  male  PATIENT RETURNS FOR FOLLOW-UP OF CHRONIC MEDICAL CONDITIONS  NO HOSPITAL STAYS OR EMERGENCY VISITS RECENTLY  MEDS WERE REVIEWED AND NO SIDE EFFECTS  NO NEW ISSUES  UNLESS NOTED BELOW  NO NEW MEDICAL PROVIDER REPORTED  The following portions of the patient's history were reviewed and updated as appropriate: allergies, current medications, past family history, past medical history, past social history, past surgical history and problem list     Review of Systems   Constitutional: Negative for activity change and appetite change  HENT: Negative for trouble swallowing  Eyes: Negative for visual disturbance  Respiratory: Negative for cough and shortness of breath  Cardiovascular: Negative for chest pain, palpitations and leg swelling  Gastrointestinal: Negative for abdominal pain and blood in stool  Endocrine: Negative for polyuria  Genitourinary: Negative for difficulty urinating and hematuria  Skin: Negative for rash  Neurological: Negative for dizziness  Psychiatric/Behavioral: Negative for behavioral problems  Objective:  Vitals:    12/21/18 1315   BP: 150/80   BP Location: Left arm   Patient Position: Sitting   Cuff Size: Adult   Pulse: 58   Temp: 97 9 °F (36 6 °C)   TempSrc: Temporal   SpO2: 97%   Weight: 75 8 kg (167 lb)      Physical Exam   Constitutional: He appears well-developed and well-nourished  HENT:   Head: Normocephalic and atraumatic     Eyes: Conjunctivae are normal    Neck: Neck supple  No thyromegaly present  Cardiovascular: Normal rate, regular rhythm, normal heart sounds and intact distal pulses  No murmur heard  Pulmonary/Chest: Effort normal and breath sounds normal  No respiratory distress  Musculoskeletal: He exhibits no edema  Lymphadenopathy:     He has no cervical adenopathy  Skin: Skin is warm and dry  Psychiatric: He has a normal mood and affect  His behavior is normal          Patient's chronic problems that were reviewed today are stable  Meds reviewed and no changes made  Appropriate labs and imaging were ordered  Preventive measures appropriate for age and sex were reviewed with patient  Immunizations were updated as appropriate

## 2018-12-21 NOTE — PATIENT INSTRUCTIONS
WE RECOMMEND GETTING THE 2- DOSE SHINGLES VACCINE ([facility]) FROM YOUR PHARMACY  CHECK WITH THEM ON THE COST  YOU SHOULD HAVE THIS IF OVER AGE 48, EVEN IF YOU HAVE HAD THE ORIGINAL VACCINE (ZOSTRIX)   MEASURES THAT HELP PREVENT FALLS:    DRINK PLENTY OF FLUIDS : 2-3 QTS PER DAY : URINE SHOULD BE LIGHT COLOR  GET REGULAR EYE CHECK UP  USE NIGHT LIGHTS  ELIMINATE ALL THROW RUGS  DO SOME WALKING EVERY DAY AND BALANCE EXERCISES  USE NON SLIP FOOT WEARBATH MATS AND GRAB BARS HELP  WHEN RISING: ALWAYS WAIT FOR 15-20 SECONDS BEFORE INITIATING WALK  CONSIDER DOING YOGA OR [facility]

## 2019-01-16 DIAGNOSIS — E78.5 HYPERLIPIDEMIA, UNSPECIFIED HYPERLIPIDEMIA TYPE: Primary | ICD-10-CM

## 2019-01-16 RX ORDER — SIMVASTATIN 20 MG
20 TABLET ORAL
Qty: 90 TABLET | Refills: 3 | Status: SHIPPED | OUTPATIENT
Start: 2019-01-16 | End: 2020-01-24 | Stop reason: SDUPTHER

## 2019-01-25 ENCOUNTER — OFFICE VISIT (OUTPATIENT)
Dept: FAMILY MEDICINE CLINIC | Facility: CLINIC | Age: 84
End: 2019-01-25
Payer: MEDICARE

## 2019-01-25 VITALS
BODY MASS INDEX: 24.12 KG/M2 | HEIGHT: 70 IN | SYSTOLIC BLOOD PRESSURE: 142 MMHG | TEMPERATURE: 98.6 F | RESPIRATION RATE: 16 BRPM | DIASTOLIC BLOOD PRESSURE: 70 MMHG | WEIGHT: 168.5 LBS | HEART RATE: 64 BPM

## 2019-01-25 DIAGNOSIS — L98.9 SKIN LESION: Primary | ICD-10-CM

## 2019-01-25 DIAGNOSIS — M19.90 ARTHRITIS: ICD-10-CM

## 2019-01-25 PROCEDURE — 99213 OFFICE O/P EST LOW 20 MIN: CPT | Performed by: FAMILY MEDICINE

## 2019-01-25 PROCEDURE — 17004 DESTROY PREMAL LESIONS 15/>: CPT | Performed by: FAMILY MEDICINE

## 2019-01-25 NOTE — PATIENT INSTRUCTIONS
Check out the following web site for CBD cream    Use twice a day for at least 3 weeks before deciding if it is working  CBDIFFERENT  Is the web site

## 2019-01-25 NOTE — PROGRESS NOTES
Assessment/Plan:    No problem-specific Assessment & Plan notes found for this encounter  Diagnoses and all orders for this visit:    Skin lesion    Arthritis          Subjective:      Patient ID: Reyna Anthony is a 80 y o  male  Bonita Copping comes with 2 concerns 1 is arthritis in the left thumb joint 2nd is a skin lesion above the nose  We had treated the skin area about 2 years ago with liquid nitrogen it disappeared now has come back  The thumb bothersome particularly on bad weather days  Voltaren gel is helping a little bit but gave him a rash  The following portions of the patient's history were reviewed and updated as appropriate: allergies, current medications, past family history, past medical history, past social history, past surgical history and problem list     Review of Systems   Musculoskeletal: Positive for arthralgias  Skin: Positive for rash  Objective:  Vitals:    01/25/19 0856   BP: 142/70   BP Location: Left arm   Patient Position: Sitting   Cuff Size: Standard   Pulse: 64   Resp: 16   Temp: 98 6 °F (37 °C)   Weight: 76 4 kg (168 lb 8 oz)   Height: 5' 10" (1 778 m)      Physical Exam   Skin:   Actinic lesion of forehead  DJD changes of metacarpophalangeal joint of left thumb

## 2019-01-30 ENCOUNTER — OFFICE VISIT (OUTPATIENT)
Dept: UROLOGY | Facility: AMBULATORY SURGERY CENTER | Age: 84
End: 2019-01-30
Payer: MEDICARE

## 2019-01-30 ENCOUNTER — TELEPHONE (OUTPATIENT)
Dept: FAMILY MEDICINE CLINIC | Facility: CLINIC | Age: 84
End: 2019-01-30

## 2019-01-30 VITALS
BODY MASS INDEX: 23.91 KG/M2 | HEIGHT: 70 IN | DIASTOLIC BLOOD PRESSURE: 66 MMHG | HEART RATE: 68 BPM | WEIGHT: 167 LBS | SYSTOLIC BLOOD PRESSURE: 124 MMHG

## 2019-01-30 DIAGNOSIS — R32 URINARY INCONTINENCE, UNSPECIFIED TYPE: ICD-10-CM

## 2019-01-30 DIAGNOSIS — R32 URINARY INCONTINENCE, UNSPECIFIED TYPE: Primary | ICD-10-CM

## 2019-01-30 LAB
SL AMB  POCT GLUCOSE, UA: NORMAL
SL AMB LEUKOCYTE ESTERASE,UA: NORMAL
SL AMB POCT BILIRUBIN,UA: NORMAL
SL AMB POCT BLOOD,UA: NORMAL
SL AMB POCT CLARITY,UA: NORMAL
SL AMB POCT COLOR,UA: YELLOW
SL AMB POCT KETONES,UA: NORMAL
SL AMB POCT NITRITE,UA: NORMAL
SL AMB POCT PH,UA: 5
SL AMB POCT SPECIFIC GRAVITY,UA: 1.01
SL AMB POCT URINE PROTEIN: NORMAL
SL AMB POCT UROBILINOGEN: NORMAL

## 2019-01-30 PROCEDURE — 81002 URINALYSIS NONAUTO W/O SCOPE: CPT | Performed by: UROLOGY

## 2019-01-30 PROCEDURE — 99204 OFFICE O/P NEW MOD 45 MIN: CPT | Performed by: UROLOGY

## 2019-01-30 RX ORDER — TAMSULOSIN HYDROCHLORIDE 0.4 MG/1
0.4 CAPSULE ORAL
Qty: 30 CAPSULE | Refills: 1 | Status: SHIPPED | OUTPATIENT
Start: 2019-01-30 | End: 2019-06-21 | Stop reason: ALTCHOICE

## 2019-01-30 NOTE — TELEPHONE ENCOUNTER
Pt called stating he seen his urologist today and they prescribed him Flonase  He states they told him it may affect his blood pressure  He wants to know if it's ok for him to use  He states he blood pressure usually runs good and he states he is on heart meds

## 2019-01-30 NOTE — ASSESSMENT & PLAN NOTE
Options were discussed with the patient  I will try him on tamsulosin  Side effects were discussed  We also discussed the correct technique for trying to push on his perineum  He will follow up in 6 weeks to re-evaluate his symptoms

## 2019-01-30 NOTE — PROGRESS NOTES
Assessment/Plan:    Urinary incontinence  Options were discussed with the patient  I will try him on tamsulosin  Side effects were discussed  We also discussed the correct technique for trying to push on his perineum  He will follow up in 6 weeks to re-evaluate his symptoms  Diagnoses and all orders for this visit:    Urinary incontinence, unspecified type  -     Ambulatory referral to Urology  -     POCT urine dip  -     tamsulosin (FLOMAX) 0 4 mg; Take 1 capsule (0 4 mg total) by mouth daily with dinner          Total visit time was 60 minutes of which over 50% was spent on counseling  Subjective:     Patient ID: Karuna Cruz is a 80 y o  male    80-year-old male presents for evaluation of urinary incontinence  The patient is having postvoid dribbling  He has other lower urinary tract symptoms as well that are detailed below  He has seen his primary doctor regarding this and he was told about some maneuvers such as pushing on his perineum to empty his proximal urethra  The patient has tried this and is not working for him  He denies any dysuria, gross hematuria, or new bone pain  He has no other complaints  The following portions of the patient's history were reviewed and updated as appropriate: allergies, current medications, past family history, past medical history, past social history, past surgical history and problem list     Review of Systems   Constitutional: Negative  HENT: Negative  Eyes: Negative  Respiratory: Negative  Cardiovascular: Negative  Gastrointestinal: Negative  Endocrine: Negative  Genitourinary:        As noted per HPI   Musculoskeletal: Negative  Skin: Negative  Allergic/Immunologic: Negative  Neurological: Negative  Hematological: Negative  Psychiatric/Behavioral: Negative          AUA SYMPTOM SCORE      Most Recent Value   AUA SYMPTOM SCORE   How often have you had a sensation of not emptying your bladder completely after you finished urinating? 1   How often have you had to urinate again less than two hours after you finished urinating? 3   How often have you found you stopped and started again several times when you urinate? 2   How often have you found it difficult to postpone urination? 5   How often have you had a weak urinary stream?  2   How often have you had to push or strain to begin urination? 1   How many times did you most typically get up to urinate from the time you went to bed at night until the time you got up in the morning? 3   Quality of Life: If you were to spend the rest of your life with your urinary condition just the way it is now, how would you feel about that?  4   AUA SYMPTOM SCORE  17              Objective:    Physical Exam   Constitutional: He is oriented to person, place, and time  He appears well-developed and well-nourished  Neck: Normal range of motion  Cardiovascular: Intact distal pulses  Pulmonary/Chest: Effort normal    Abdominal: Soft  Bowel sounds are normal  He exhibits no distension and no mass  There is no tenderness  There is no rebound and no guarding  Genitourinary: Rectum normal    Genitourinary Comments: 60 g, smooth, mobile nodule on the left side likely calcification  Musculoskeletal: Normal range of motion  Neurological: He is alert and oriented to person, place, and time  Skin: Skin is warm and dry  Psychiatric: He has a normal mood and affect  Vitals reviewed          Results  No results found for: PSA  Lab Results   Component Value Date    GLUCOSE 109 (H) 06/18/2018    CALCIUM 8 8 11/27/2018     06/18/2018    K 4 3 11/27/2018    CO2 25 11/27/2018     11/27/2018    BUN 17 11/27/2018    CREATININE 0 99 11/27/2018     Lab Results   Component Value Date    WBC 9 10 10/25/2018    HGB 13 0 (L) 10/25/2018    HCT 37 7 (L) 10/25/2018    MCV 96 10/25/2018     10/25/2018       Recent Results (from the past 1 hour(s))   POCT urine dip    Collection Time: 01/30/19  2:13 PM   Result Value Ref Range    LEUKOCYTE ESTERASE,UA NEG     NITRITE,UA NEG     SL AMB POCT UROBILINOGEN NEG     POCT URINE PROTEIN NEG      PH,UA 5     BLOOD,UA NEG     SPECIFIC GRAVITY,UA 1 010     KETONES,UA NEG     BILIRUBIN,UA NEG     GLUCOSE, UA NEG      COLOR,UA Yellow     CLARITY,UA Cear    ]

## 2019-02-13 RX ORDER — TAMSULOSIN HYDROCHLORIDE 0.4 MG/1
CAPSULE ORAL
Qty: 90 CAPSULE | Refills: 1 | OUTPATIENT
Start: 2019-02-13

## 2019-03-06 ENCOUNTER — OFFICE VISIT (OUTPATIENT)
Dept: UROLOGY | Facility: AMBULATORY SURGERY CENTER | Age: 84
End: 2019-03-06
Payer: MEDICARE

## 2019-03-06 VITALS
HEIGHT: 70 IN | BODY MASS INDEX: 23.48 KG/M2 | HEART RATE: 58 BPM | DIASTOLIC BLOOD PRESSURE: 66 MMHG | WEIGHT: 164 LBS | SYSTOLIC BLOOD PRESSURE: 114 MMHG

## 2019-03-06 DIAGNOSIS — I10 BENIGN ESSENTIAL HTN: ICD-10-CM

## 2019-03-06 DIAGNOSIS — R32 URINARY INCONTINENCE, UNSPECIFIED TYPE: Primary | ICD-10-CM

## 2019-03-06 PROCEDURE — 99213 OFFICE O/P EST LOW 20 MIN: CPT | Performed by: NURSE PRACTITIONER

## 2019-03-15 NOTE — PROGRESS NOTES
3/6/2019      Chief Complaint   Patient presents with    Urinary Incontinence     Assessment and Plan    80 y o  male managed by Dr Rocky Sosa    1  Benign prostatic hyperplasia  · Patient started on tamsulosin 0 4 mg p  O  Daily approximately 6 months ago  · He reports not feeling any difference since starting the Flomax and wishes to discontinue  · Discuss the need for time to re-evaluate whether medication will work for him  Despite discussion, patient wishes to discontinue and start with finasteride 5 mg p o  Daily  · Finasteride 5 mg p o  Daily prescription sent electronically to the pharmacy on file  · Discussed pelvic floor exercises do Kegel exercises to assist in urinary continence   Referral to physical therapy for pelvic floor exercises given  · Consider cystoscopy if continued symptoms  · Follow-up in 3 months    History of Present Illness  Lalitha Smiley is a 80 y o  male here for follow up evaluation of  benign prostatic hyperplasia and urinary incontinence approximately 6 weeks ago patient started tamsulosin 0 4 mg p o  Daily patient reports since the institution of the medication, he is feels no difference with his urinary symptoms  Patient continues to complain of minimal urinary incontinence, approximately getting up every 2 hours at night to urinate, occasional weak stream and occasional postvoid dribbling  Patient wishes to discontinue tamsulosin  Patient denies dysuria, hematuria, urinary frequency and urgency  He reports sensation of complete bladder emptying after urinating  Review of Systems   Constitutional: Negative for chills and fever  Respiratory: Negative for cough and shortness of breath  Cardiovascular: Negative for chest pain  Gastrointestinal: Negative for abdominal distention, abdominal pain, blood in stool, nausea and vomiting  Genitourinary: Positive for enuresis  Negative for difficulty urinating, dysuria, flank pain, frequency, hematuria and urgency  Urinary incontinence   Skin: Negative for rash  Neurological: Negative for dizziness and light-headedness  Urinary Incontinence Screening      Most Recent Value   Urinary Incontinence   Urinary Incontinence? Yes [no pads or pull ups]   Incomplete emptying? No   Urinary frequency? Yes [sometimes]   Urinary urgency? No   Urinary hesitancy? No   Dysuria (painful difficult urination)? No   Nocturia (waking up to use the bathroom)? Yes [x3]   Straining (having to push to go)? No   Weak stream?  No   Intermittent stream?  No   Post void dribbling? Yes          AUA SYMPTOM SCORE      Most Recent Value   AUA SYMPTOM SCORE   How often have you had a sensation of not emptying your bladder completely after you finished urinating? 1   How often have you had to urinate again less than two hours after you finished urinating? 3   How often have you found you stopped and started again several times when you urinate? 1   How often have you found it difficult to postpone urination? 2   How often have you had a weak urinary stream?  2   How often have you had to push or strain to begin urination? 0   How many times did you most typically get up to urinate from the time you went to bed at night until the time you got up in the morning?   3   Quality of Life: If you were to spend the rest of your life with your urinary condition just the way it is now, how would you feel about that?  4   AUA SYMPTOM SCORE  12         Past Medical History  Past Medical History:   Diagnosis Date    Anemia, iron deficiency     Drug Therapy, poor absorbtion documented    Appendicitis     Arthritis 1/25/2019    Cardiac disease     Colon cancer ([facility] [address] ) 12/2013    Resected: BREANNA    Congenital nystagmus     Left    Hyperlipidemia     Hypertension     Inguinal hernia     Sciatica 2010    resolved from problem list-2011       Past Social History  Past Surgical History:   Procedure Laterality Date    APPENDECTOMY      Appendicitis    CHOLECYSTECTOMY      COLON SIGMOID RESECTION  12/2013    COLON SURGERY      CORONARY ARTERY BYPASS GRAFT      x 2    CORONARY ARTERY BYPASS GRAFT      x2    CORONARY ARTERY BYPASS GRAFT      CORONARY ARTERY BYPASS GRAFT      INGUINAL HERNIA REPAIR      SHOULDER ARTHROCENTESIS Right     sub-acromial bursa area     Social History     Tobacco Use   Smoking Status Never Smoker   Smokeless Tobacco Never Used       Past Family History  Family History   Problem Relation Age of Onset    Hypertension Father     Heart disease Father     Heart disease Mother     Cancer Sister     Cancer Brother        Past Social history  Social History     Socioeconomic History    Marital status: /Civil Union     Spouse name: Not on file    Number of children: Not on file    Years of education: Not on file    Highest education level: Not on file   Occupational History    Occupation: Retired     Comment: Volunteer   Social Needs    Financial resource strain: Not on file    Food insecurity:     Worry: Not on file     Inability: Not on file   [employer] needs:     Medical: Not on file     Non-medical: Not on file   Tobacco Use    Smoking status: Never Smoker    Smokeless tobacco: Never Used   Substance and Sexual Activity    Alcohol use: No    Drug use: No    Sexual activity: Not on file   Lifestyle    Physical activity:     Days per week: Not on file     Minutes per session: Not on file    Stress: Not on file   Relationships    Social connections:     Talks on phone: Not on file     Gets together: Not on file     Attends Episcopal service: Not on file     Active member of club or organization: Not on file     Attends meetings of clubs or organizations: Not on file     Relationship status: Not on file    Intimate partner violence:     Fear of current or ex partner: Not on file     Emotionally abused: Not on file     Physically abused: Not on file     Forced sexual activity: Not on file   Other Topics Concern    Not on file   Social History Narrative    Not on file       Current Medications  Current Outpatient Medications   Medication Sig Dispense Refill    amLODIPine (NORVASC) 5 mg tablet Take 1 tablet (5 mg total) by mouth daily 90 tablet 3    aspirin 81 mg chewable tablet Chew 81 mg      diclofenac sodium (VOLTAREN) 1 % Apply 2 g topically 3 (three) times a day 15 g 6    folic acid (FOLVITE) 760 MCG tablet Take 400 mcg by mouth daily      lisinopril (ZESTRIL) 20 mg tablet Take 1 tablet (20 mg total) by mouth daily 90 tablet 3    metoprolol tartrate (LOPRESSOR) 50 mg tablet Take 25 mg by mouth every 12 (twelve) hours      Multiple Vitamins-Minerals (MULTIVITAL-M PO) Take 1 tablet by mouth      NITROGLYCERIN ER PO place 1 tablet by sublingual route at the 1st sign of attack; may repeat every 5 min until relief; if pain persists after 3 tablets in 15 min, prompt medical attention is recommended      omeprazole (PriLOSEC) 20 mg delayed release capsule Take 20 mg by mouth daily      potassium chloride (K-DUR,KLOR-CON) 20 mEq tablet Use for 2 wks then stop 14 tablet 0    simvastatin (ZOCOR) 20 mg tablet Take 1 tablet (20 mg total) by mouth daily at bedtime 90 tablet 3    tamsulosin (FLOMAX) 0 4 mg Take 1 capsule (0 4 mg total) by mouth daily with dinner 30 capsule 1     No current facility-administered medications for this visit          Allergies  Allergies   Allergen Reactions    Pregabalin      Other reaction(s): M/S CHANGE         The following portions of the patient's history were reviewed and updated as appropriate: allergies, current medications, past medical history, past social history, past surgical history and problem list       Vitals  Vitals:    03/06/19 1110   BP: 114/66   BP Location: Left arm   Patient Position: Sitting   Cuff Size: Standard   Pulse: 58   Weight: 74 4 kg (164 lb)   Height: 5' 10" (1 778 m)     Physical Exam  Physical Exam   Constitutional: He is oriented to person, place, and time  He appears well-developed and well-nourished  HENT:   Head: Atraumatic  Eyes: EOM are normal    Neck: Normal range of motion  Cardiovascular: Normal rate, regular rhythm, normal heart sounds and intact distal pulses  Exam reveals no friction rub  No murmur heard  Pulmonary/Chest: Effort normal and breath sounds normal  No respiratory distress  Abdominal: Bowel sounds are normal    Musculoskeletal: Normal range of motion  Neurological: He is alert and oriented to person, place, and time  Skin: Skin is warm and dry  Psychiatric: He has a normal mood and affect  Vitals reviewed  Results  No results found for this or any previous visit (from the past 1 hour(s))  ]  No results found for: PSA  Lab Results   Component Value Date    GLUCOSE 109 (H) 06/18/2018    CALCIUM 8 8 11/27/2018     06/18/2018    K 4 3 11/27/2018    CO2 25 11/27/2018     11/27/2018    BUN 17 11/27/2018    CREATININE 0 99 11/27/2018     Lab Results   Component Value Date    WBC 9 10 10/25/2018    HGB 13 0 (L) 10/25/2018    HCT 37 7 (L) 10/25/2018    MCV 96 10/25/2018     10/25/2018     JEFF Lamb

## 2019-05-13 DIAGNOSIS — M18.12 ARTHRITIS OF CARPOMETACARPAL (CMC) JOINT OF LEFT THUMB: ICD-10-CM

## 2019-05-29 ENCOUNTER — TELEPHONE (OUTPATIENT)
Dept: UROLOGY | Facility: AMBULATORY SURGERY CENTER | Age: 84
End: 2019-05-29

## 2019-05-29 ENCOUNTER — TELEPHONE (OUTPATIENT)
Dept: UROLOGY | Facility: CLINIC | Age: 84
End: 2019-05-29

## 2019-06-21 ENCOUNTER — OFFICE VISIT (OUTPATIENT)
Dept: FAMILY MEDICINE CLINIC | Facility: CLINIC | Age: 84
End: 2019-06-21
Payer: MEDICARE

## 2019-06-21 VITALS
OXYGEN SATURATION: 98 % | DIASTOLIC BLOOD PRESSURE: 80 MMHG | SYSTOLIC BLOOD PRESSURE: 126 MMHG | HEIGHT: 70 IN | WEIGHT: 166 LBS | TEMPERATURE: 98.1 F | BODY MASS INDEX: 23.77 KG/M2 | HEART RATE: 60 BPM

## 2019-06-21 DIAGNOSIS — Z00.00 MEDICARE ANNUAL WELLNESS VISIT, SUBSEQUENT: Chronic | ICD-10-CM

## 2019-06-21 DIAGNOSIS — M79.89 SWELLING OF RIGHT FOOT: Chronic | ICD-10-CM

## 2019-06-21 DIAGNOSIS — D64.9 ANEMIA, UNSPECIFIED TYPE: ICD-10-CM

## 2019-06-21 DIAGNOSIS — I25.10 CORONARY ARTERY DISEASE INVOLVING NATIVE CORONARY ARTERY OF NATIVE HEART WITHOUT ANGINA PECTORIS: ICD-10-CM

## 2019-06-21 DIAGNOSIS — I10 BENIGN ESSENTIAL HTN: Primary | ICD-10-CM

## 2019-06-21 LAB
ANION GAP SERPL CALCULATED.3IONS-SCNC: 4 MMOL/L (ref 4–13)
BASOPHILS # BLD AUTO: 0.02 THOUSANDS/ΜL (ref 0–0.1)
BASOPHILS NFR BLD AUTO: 0 % (ref 0–1)
BUN SERPL-MCNC: 17 MG/DL (ref 5–25)
CALCIUM SERPL-MCNC: 8.5 MG/DL (ref 8.3–10.1)
CHLORIDE SERPL-SCNC: 108 MMOL/L (ref 100–108)
CO2 SERPL-SCNC: 26 MMOL/L (ref 21–32)
CREAT SERPL-MCNC: 0.96 MG/DL (ref 0.6–1.3)
EOSINOPHIL # BLD AUTO: 0.32 THOUSAND/ΜL (ref 0–0.61)
EOSINOPHIL NFR BLD AUTO: 5 % (ref 0–6)
ERYTHROCYTE [DISTWIDTH] IN BLOOD BY AUTOMATED COUNT: 12.4 % (ref 11.6–15.1)
GFR SERPL CREATININE-BSD FRML MDRD: 71 ML/MIN/1.73SQ M
GLUCOSE SERPL-MCNC: 83 MG/DL (ref 65–140)
HCT VFR BLD AUTO: 43 % (ref 36.5–49.3)
HGB BLD-MCNC: 14 G/DL (ref 12–17)
IMM GRANULOCYTES # BLD AUTO: 0.03 THOUSAND/UL (ref 0–0.2)
IMM GRANULOCYTES NFR BLD AUTO: 1 % (ref 0–2)
LYMPHOCYTES # BLD AUTO: 1.08 THOUSANDS/ΜL (ref 0.6–4.47)
LYMPHOCYTES NFR BLD AUTO: 17 % (ref 14–44)
MCH RBC QN AUTO: 31.7 PG (ref 26.8–34.3)
MCHC RBC AUTO-ENTMCNC: 32.6 G/DL (ref 31.4–37.4)
MCV RBC AUTO: 97 FL (ref 82–98)
MONOCYTES # BLD AUTO: 0.75 THOUSAND/ΜL (ref 0.17–1.22)
MONOCYTES NFR BLD AUTO: 12 % (ref 4–12)
NEUTROPHILS # BLD AUTO: 4.11 THOUSANDS/ΜL (ref 1.85–7.62)
NEUTS SEG NFR BLD AUTO: 65 % (ref 43–75)
NRBC BLD AUTO-RTO: 0 /100 WBCS
PLATELET # BLD AUTO: 181 THOUSANDS/UL (ref 149–390)
PMV BLD AUTO: 10.7 FL (ref 8.9–12.7)
POTASSIUM SERPL-SCNC: 3.7 MMOL/L (ref 3.5–5.3)
RBC # BLD AUTO: 4.42 MILLION/UL (ref 3.88–5.62)
SODIUM SERPL-SCNC: 138 MMOL/L (ref 136–145)
WBC # BLD AUTO: 6.31 THOUSAND/UL (ref 4.31–10.16)

## 2019-06-21 PROCEDURE — G0439 PPPS, SUBSEQ VISIT: HCPCS | Performed by: FAMILY MEDICINE

## 2019-06-21 PROCEDURE — 99214 OFFICE O/P EST MOD 30 MIN: CPT | Performed by: FAMILY MEDICINE

## 2019-06-21 PROCEDURE — 36415 COLL VENOUS BLD VENIPUNCTURE: CPT | Performed by: FAMILY MEDICINE

## 2019-06-21 PROCEDURE — 85025 COMPLETE CBC W/AUTO DIFF WBC: CPT | Performed by: FAMILY MEDICINE

## 2019-06-21 PROCEDURE — 80048 BASIC METABOLIC PNL TOTAL CA: CPT | Performed by: FAMILY MEDICINE

## 2019-06-21 RX ORDER — METOPROLOL TARTRATE 50 MG/1
50 TABLET, FILM COATED ORAL EVERY 12 HOURS SCHEDULED
Qty: 180 TABLET | Refills: 3 | Status: SHIPPED | OUTPATIENT
Start: 2019-06-21 | End: 2020-06-18 | Stop reason: SDUPTHER

## 2019-06-21 RX ORDER — PREDNISONE 10 MG/1
TABLET ORAL
COMMUNITY
Start: 2019-06-18 | End: 2019-07-15 | Stop reason: ALTCHOICE

## 2019-06-21 RX ORDER — HYDROCHLOROTHIAZIDE 12.5 MG/1
TABLET ORAL
Qty: 90 TABLET | Refills: 3 | Status: SHIPPED | OUTPATIENT
Start: 2019-06-21 | End: 2020-10-15 | Stop reason: ALTCHOICE

## 2019-07-05 PROBLEM — N13.8 BPH WITH OBSTRUCTION/LOWER URINARY TRACT SYMPTOMS: Status: ACTIVE | Noted: 2019-07-05

## 2019-07-05 PROBLEM — N40.1 BPH WITH OBSTRUCTION/LOWER URINARY TRACT SYMPTOMS: Status: ACTIVE | Noted: 2019-07-05

## 2019-07-15 ENCOUNTER — OFFICE VISIT (OUTPATIENT)
Dept: UROLOGY | Facility: AMBULATORY SURGERY CENTER | Age: 84
End: 2019-07-15
Payer: MEDICARE

## 2019-07-15 VITALS
HEART RATE: 64 BPM | HEIGHT: 70 IN | WEIGHT: 166 LBS | DIASTOLIC BLOOD PRESSURE: 60 MMHG | SYSTOLIC BLOOD PRESSURE: 112 MMHG | BODY MASS INDEX: 23.77 KG/M2

## 2019-07-15 DIAGNOSIS — N40.1 BPH WITH OBSTRUCTION/LOWER URINARY TRACT SYMPTOMS: Primary | ICD-10-CM

## 2019-07-15 DIAGNOSIS — N13.8 BPH WITH OBSTRUCTION/LOWER URINARY TRACT SYMPTOMS: Primary | ICD-10-CM

## 2019-07-15 DIAGNOSIS — R32 URINARY INCONTINENCE, UNSPECIFIED TYPE: ICD-10-CM

## 2019-07-15 LAB
POST-VOID RESIDUAL VOLUME, ML POC: 74 ML
SL AMB  POCT GLUCOSE, UA: NORMAL
SL AMB LEUKOCYTE ESTERASE,UA: NORMAL
SL AMB POCT BILIRUBIN,UA: NORMAL
SL AMB POCT BLOOD,UA: NORMAL
SL AMB POCT CLARITY,UA: CLEAR
SL AMB POCT COLOR,UA: YELLOW
SL AMB POCT KETONES,UA: NORMAL
SL AMB POCT NITRITE,UA: NORMAL
SL AMB POCT PH,UA: 6
SL AMB POCT SPECIFIC GRAVITY,UA: 1.02
SL AMB POCT URINE PROTEIN: NORMAL
SL AMB POCT UROBILINOGEN: 0.2

## 2019-07-15 PROCEDURE — 99213 OFFICE O/P EST LOW 20 MIN: CPT | Performed by: NURSE PRACTITIONER

## 2019-07-15 PROCEDURE — 51798 US URINE CAPACITY MEASURE: CPT | Performed by: NURSE PRACTITIONER

## 2019-07-15 PROCEDURE — 81002 URINALYSIS NONAUTO W/O SCOPE: CPT | Performed by: NURSE PRACTITIONER

## 2019-07-15 RX ORDER — TRIAMCINOLONE ACETONIDE 1 MG/G
CREAM TOPICAL
COMMUNITY
Start: 2017-11-17 | End: 2020-10-15 | Stop reason: SDUPTHER

## 2019-07-15 NOTE — PATIENT INSTRUCTIONS
Kegal exercises  Increasre the amount of water you are drinking during the day - upwards to 32 oz per day  Avoid/limit bladder irritants  Call for concerning symptoms

## 2019-07-15 NOTE — PROGRESS NOTES
7/15/2019    Chief Complaint   Patient presents with    Urinary Frequency     Patient Active Problem List   Diagnosis    Anemia    Benign essential HTN    CAD (coronary artery disease), native coronary artery    Dyslipidemia    History of colon cancer    Elevated serum creatinine    Leukocytosis    Hypokalemia    Syncopal episodes    Skin lesion    Arthritis    Urinary incontinence    Swelling of right foot    Medicare annual wellness visit, subsequent    BPH with obstruction/lower urinary tract symptoms     Assessment and Plan    80 y o  male managed by Dr Torrence Primrose    1  Benign prostatic hyperplasia  · Urine dip in office negative   · Bladder scan PVR 74 ml  · Increase water intake, decrease bladder stimulants  · Discussed Kegal exercises      History of Present Illness  Rosana Roth is a 80 y o  male here for follow up evaluation of  urinary symptoms secondary to benign prostatic hyperplasia  At his prior office visit dated 03/06/2019, patient complained urinary incontinence, urinary frequency, occasional weak urinary stream and postvoid dribbling  Patient also complained the ineffectiveness of tamsulosin and discontinued use  Pt reports not taking Finasteride and not starting Pelvic floor therapy   He reports while he is at home he goes to the bathroom every 1-2 hours but 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[employer] he is able to go all morning without using the bathroom  The patient reports doing Kegel exercises intermittently  Patient admits to not drinking much water throughout the day but instead, drinks hot or cold tea throughout the course of the day  Patient currently denies dysuria, hematuria and sensation of incomplete bladder emptying  He reports his urinary symptoms are not terribly bothersome and for the most part, is satisfied with his urine health at this time      Review of Systems   Constitutional: Negative for chills and fever     Respiratory: Negative for cough and shortness of breath  Cardiovascular: Negative for chest pain  Gastrointestinal: Negative for abdominal distention, abdominal pain, blood in stool, nausea and vomiting  Genitourinary: Positive for frequency  Negative for difficulty urinating, dysuria, enuresis, flank pain, hematuria and urgency  Skin: Negative for rash  Neurological: Negative for dizziness  AUA SYMPTOM SCORE      Most Recent Value   AUA SYMPTOM SCORE   How often have you had a sensation of not emptying your bladder completely after you finished urinating? 2   How often have you had to urinate again less than two hours after you finished urinating? 2   How often have you found you stopped and started again several times when you urinate?  0   How often have you found it difficult to postpone urination? 2   How often have you had a weak urinary stream?  3   How often have you had to push or strain to begin urination? 2   How many times did you most typically get up to urinate from the time you went to bed at night until the time you got up in the morning?   3   Quality of Life: If you were to spend the rest of your life with your urinary condition just the way it is now, how would you feel about that?  3   AUA SYMPTOM SCORE  14         Past Medical History  Past Medical History:   Diagnosis Date    Anemia, iron deficiency     Drug Therapy, poor absorbtion documented    Appendicitis     Arthritis 1/25/2019    Cardiac disease     Colon cancer ([facility] [address] ) 12/2013    Resected: BREANNA    Congenital nystagmus     Left    Hyperlipidemia     Hypertension     Inguinal hernia     Sciatica 2010    resolved from problem list-2011       Past Social History  Past Surgical History:   Procedure Laterality Date    APPENDECTOMY      Appendicitis    CHOLECYSTECTOMY      COLON SIGMOID RESECTION  12/2013    COLON SURGERY      CORONARY ARTERY BYPASS GRAFT      x 2    CORONARY ARTERY BYPASS GRAFT      x2    CORONARY ARTERY BYPASS GRAFT      CORONARY ARTERY BYPASS GRAFT      INGUINAL HERNIA REPAIR      SHOULDER ARTHROCENTESIS Right     sub-acromial bursa area     Social History     Tobacco Use   Smoking Status Never Smoker   Smokeless Tobacco Never Used       Past Family History  Family History   Problem Relation Age of Onset    Hypertension Father     Heart disease Father     Heart disease Mother     Cancer Sister     Cancer Brother        Past Social history  Social History     Socioeconomic History    Marital status: /Civil Union     Spouse name: Not on file    Number of children: Not on file    Years of education: Not on file    Highest education level: Not on file   Occupational History    Occupation: Retired     Comment: Volunteer   Social Needs    Financial resource strain: Not on file    Food insecurity:     Worry: Not on file     Inability: Not on file   [employer] needs:     Medical: Not on file     Non-medical: Not on file   Tobacco Use    Smoking status: Never Smoker    Smokeless tobacco: Never Used   Substance and Sexual Activity    Alcohol use: No    Drug use: No    Sexual activity: Not on file   Lifestyle    Physical activity:     Days per week: Not on file     Minutes per session: Not on file    Stress: Not on file   Relationships    Social connections:     Talks on phone: Not on file     Gets together: Not on file     Attends Scientologist service: Not on file     Active member of club or organization: Not on file     Attends meetings of clubs or organizations: Not on file     Relationship status: Not on file    Intimate partner violence:     Fear of current or ex partner: Not on file     Emotionally abused: Not on file     Physically abused: Not on file     Forced sexual activity: Not on file   Other Topics Concern    Not on file   Social History Narrative    Not on file       Current Medications  Current Outpatient Medications   Medication Sig Dispense Refill    amLODIPine (NORVASC) 5 mg tablet Take 1 tablet (5 mg total) by mouth daily 90 tablet 3    aspirin 81 mg chewable tablet Chew 81 mg      folic acid (FOLVITE) 512 MCG tablet Take 400 mcg by mouth daily      hydrochlorothiazide (HYDRODIURIL) 12 5 mg tablet Use 1 three times per week for swelling 90 tablet 3    lisinopril (ZESTRIL) 20 mg tablet Take 1 tablet (20 mg total) by mouth daily 90 tablet 3    metoprolol tartrate (LOPRESSOR) 50 mg tablet Take 1 tablet (50 mg total) by mouth every 12 (twelve) hours 180 tablet 3    Multiple Vitamins-Minerals (MULTIVITAL-M PO) Take 1 tablet by mouth      omeprazole (PriLOSEC) 20 mg delayed release capsule Take 20 mg by mouth daily      simvastatin (ZOCOR) 20 mg tablet Take 1 tablet (20 mg total) by mouth daily at bedtime 90 tablet 3    triamcinolone (KENALOG) 0 1 % cream apply by topical route 2 times every day a thin layer to the affected area(s) for scalp ithing      NITROGLYCERIN ER PO place 1 tablet by sublingual route at the 1st sign of attack; may repeat every 5 min until relief; if pain persists after 3 tablets in 15 min, prompt medical attention is recommended       No current facility-administered medications for this visit  Allergies  Allergies   Allergen Reactions    Pregabalin      Other reaction(s): M/S CHANGE         The following portions of the patient's history were reviewed and updated as appropriate: allergies, current medications, past medical history, past social history, past surgical history and problem list       Vitals  Vitals:    07/15/19 1304   BP: 112/60   Pulse: 64   Weight: 75 3 kg (166 lb)   Height: 5' 10" (1 778 m)           Physical Exam  Physical Exam      Results  No results found for this or any previous visit (from the past 1 hour(s))  ]  No results found for: PSA  Lab Results   Component Value Date    GLUCOSE 109 (H) 06/18/2018    CALCIUM 8 5 06/21/2019     06/18/2018    K 3 7 06/21/2019    CO2 26 06/21/2019     06/21/2019    BUN 17 06/21/2019 CREATININE 0 96 06/21/2019     Lab Results   Component Value Date    WBC 6 31 06/21/2019    HGB 14 0 06/21/2019    HCT 43 0 06/21/2019    MCV 97 06/21/2019     06/21/2019           Orders  Orders Placed This Encounter   Procedures    POCT urine dip    POCT Measure PVR

## 2019-07-15 NOTE — LETTER
July 15, 2019     Brenda العلي, [address]    Patient: Alessandro Auguste   YOB: 1932   Date of Visit: 7/15/2019       Dear Dr Denise Escalona:    Thank you for referring Beba Forman to me for evaluation  Below are my notes for this consultation  If you have questions, please do not hesitate to call me  I look forward to following your patient along with you  Sincerely,        JEFF Watson        CC: No Recipients  Cyn Watson Casia   7/15/2019  3:44 PM  Sign at close encounter  7/15/2019    Chief Complaint   Patient presents with    Urinary Frequency     Patient Active Problem List   Diagnosis    Anemia    Benign essential HTN    CAD (coronary artery disease), native coronary artery    Dyslipidemia    History of colon cancer    Elevated serum creatinine    Leukocytosis    Hypokalemia    Syncopal episodes    Skin lesion    Arthritis    Urinary incontinence    Swelling of right foot    Medicare annual wellness visit, subsequent    BPH with obstruction/lower urinary tract symptoms     Assessment and Plan    80 y o  male managed by Dr Taylor Camejo    1  Benign prostatic hyperplasia  · Urine dip in office negative   · Bladder scan PVR 74 ml  · Increase water intake, decrease bladder stimulants  · Discussed Kegal exercises      History of Present Illness  Alessandro Auguste is a 80 y o  male here for follow up evaluation of  urinary symptoms secondary to benign prostatic hyperplasia  At his prior office visit dated 03/06/2019, patient complained urinary incontinence, urinary frequency, occasional weak urinary stream and postvoid dribbling  Patient also complained the ineffectiveness of tamsulosin and discontinued use  Pt reports not taking Finasteride and not starting Pelvic floor therapy     He reports while he is at home he goes to the bathroom every 1-2 hours but 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[employer] he is able to go all morning without using the bathroom  The patient reports doing Kegel exercises intermittently  Patient admits to not drinking much water throughout the day but instead, drinks hot or cold tea throughout the course of the day  Patient currently denies dysuria, hematuria and sensation of incomplete bladder emptying  He reports his urinary symptoms are not terribly bothersome and for the most part, is satisfied with his urine health at this time      Review of Systems   Constitutional: Negative for chills and fever  Respiratory: Negative for cough and shortness of breath  Cardiovascular: Negative for chest pain  Gastrointestinal: Negative for abdominal distention, abdominal pain, blood in stool, nausea and vomiting  Genitourinary: Positive for frequency  Negative for difficulty urinating, dysuria, enuresis, flank pain, hematuria and urgency  Skin: Negative for rash  Neurological: Negative for dizziness  AUA SYMPTOM SCORE      Most Recent Value   AUA SYMPTOM SCORE   How often have you had a sensation of not emptying your bladder completely after you finished urinating? 2   How often have you had to urinate again less than two hours after you finished urinating? 2   How often have you found you stopped and started again several times when you urinate?  0   How often have you found it difficult to postpone urination? 2   How often have you had a weak urinary stream?  3   How often have you had to push or strain to begin urination? 2   How many times did you most typically get up to urinate from the time you went to bed at night until the time you got up in the morning?   3   Quality of Life: If you were to spend the rest of your life with your urinary condition just the way it is now, how would you feel about that?  3   AUA SYMPTOM SCORE  14         Past Medical History  Past Medical History:   Diagnosis Date    Anemia, iron deficiency     Drug Therapy, poor absorbtion documented    Appendicitis     Arthritis 1/25/2019    Cardiac disease     Colon cancer ([facility] [address] ) 12/2013    Resected: BREANNA    Congenital nystagmus     Left    Hyperlipidemia     Hypertension     Inguinal hernia     Sciatica 2010    resolved from problem list-2011       Past Social History  Past Surgical History:   Procedure Laterality Date    APPENDECTOMY      Appendicitis    CHOLECYSTECTOMY      COLON SIGMOID RESECTION  12/2013    COLON SURGERY      CORONARY ARTERY BYPASS GRAFT      x 2    CORONARY ARTERY BYPASS GRAFT      x2    CORONARY ARTERY BYPASS GRAFT      CORONARY ARTERY BYPASS GRAFT      INGUINAL HERNIA REPAIR      SHOULDER ARTHROCENTESIS Right     sub-acromial bursa area     Social History     Tobacco Use   Smoking Status Never Smoker   Smokeless Tobacco Never Used       Past Family History  Family History   Problem Relation Age of Onset    Hypertension Father     Heart disease Father     Heart disease Mother     Cancer Sister     Cancer Brother        Past Social history  Social History     Socioeconomic History    Marital status: /Civil Union     Spouse name: Not on file    Number of children: Not on file    Years of education: Not on file    Highest education level: Not on file   Occupational History    Occupation: Retired     Comment: Volunteer   Social Needs    Financial resource strain: Not on file    Food insecurity:     Worry: Not on file     Inability: Not on file   [employer] needs:     Medical: Not on file     Non-medical: Not on file   Tobacco Use    Smoking status: Never Smoker    Smokeless tobacco: Never Used   Substance and Sexual Activity    Alcohol use: No    Drug use: No    Sexual activity: Not on file   Lifestyle    Physical activity:     Days per week: Not on file     Minutes per session: Not on file    Stress: Not on file   Relationships    Social connections:     Talks on phone: Not on file     Gets together: Not on file     Attends Orthodox service: Not on file     Active member of club or organization: Not on file     Attends meetings of clubs or organizations: Not on file     Relationship status: Not on file    Intimate partner violence:     Fear of current or ex partner: Not on file     Emotionally abused: Not on file     Physically abused: Not on file     Forced sexual activity: Not on file   Other Topics Concern    Not on file   Social History Narrative    Not on file       Current Medications  Current Outpatient Medications   Medication Sig Dispense Refill    amLODIPine (NORVASC) 5 mg tablet Take 1 tablet (5 mg total) by mouth daily 90 tablet 3    aspirin 81 mg chewable tablet Chew 81 mg      folic acid (FOLVITE) 125 MCG tablet Take 400 mcg by mouth daily      hydrochlorothiazide (HYDRODIURIL) 12 5 mg tablet Use 1 three times per week for swelling 90 tablet 3    lisinopril (ZESTRIL) 20 mg tablet Take 1 tablet (20 mg total) by mouth daily 90 tablet 3    metoprolol tartrate (LOPRESSOR) 50 mg tablet Take 1 tablet (50 mg total) by mouth every 12 (twelve) hours 180 tablet 3    Multiple Vitamins-Minerals (MULTIVITAL-M PO) Take 1 tablet by mouth      omeprazole (PriLOSEC) 20 mg delayed release capsule Take 20 mg by mouth daily      simvastatin (ZOCOR) 20 mg tablet Take 1 tablet (20 mg total) by mouth daily at bedtime 90 tablet 3    triamcinolone (KENALOG) 0 1 % cream apply by topical route 2 times every day a thin layer to the affected area(s) for scalp ithing      NITROGLYCERIN ER PO place 1 tablet by sublingual route at the 1st sign of attack; may repeat every 5 min until relief; if pain persists after 3 tablets in 15 min, prompt medical attention is recommended       No current facility-administered medications for this visit          Allergies  Allergies   Allergen Reactions    Pregabalin      Other reaction(s): M/S CHANGE         The following portions of the patient's history were reviewed and updated as appropriate: allergies, current medications, past medical history, past social history, past surgical history and problem list       Vitals  Vitals:    07/15/19 1304   BP: 112/60   Pulse: 64   Weight: 75 3 kg (166 lb)   Height: 5' 10" (1 778 m)           Physical Exam  Physical Exam      Results  No results found for this or any previous visit (from the past 1 hour(s))  ]  No results found for: PSA  Lab Results   Component Value Date    GLUCOSE 109 (H) 06/18/2018    CALCIUM 8 5 06/21/2019     06/18/2018    K 3 7 06/21/2019    CO2 26 06/21/2019     06/21/2019    BUN 17 06/21/2019    CREATININE 0 96 06/21/2019     Lab Results   Component Value Date    WBC 6 31 06/21/2019    HGB 14 0 06/21/2019    HCT 43 0 06/21/2019    MCV 97 06/21/2019     06/21/2019           Orders  Orders Placed This Encounter   Procedures    POCT urine dip    POCT Measure PVR

## 2019-11-13 DIAGNOSIS — I10 BENIGN ESSENTIAL HTN: ICD-10-CM

## 2019-11-13 RX ORDER — LISINOPRIL 20 MG/1
20 TABLET ORAL DAILY
Qty: 90 TABLET | Refills: 3 | Status: SHIPPED | OUTPATIENT
Start: 2019-11-13 | End: 2020-11-06 | Stop reason: SDUPTHER

## 2019-12-12 ENCOUNTER — OFFICE VISIT (OUTPATIENT)
Dept: FAMILY MEDICINE CLINIC | Facility: CLINIC | Age: 84
End: 2019-12-12
Payer: MEDICARE

## 2019-12-12 VITALS
BODY MASS INDEX: 24.21 KG/M2 | OXYGEN SATURATION: 96 % | SYSTOLIC BLOOD PRESSURE: 110 MMHG | HEART RATE: 61 BPM | TEMPERATURE: 98 F | WEIGHT: 169.1 LBS | HEIGHT: 70 IN | DIASTOLIC BLOOD PRESSURE: 80 MMHG

## 2019-12-12 DIAGNOSIS — I25.10 CORONARY ARTERY DISEASE INVOLVING NATIVE CORONARY ARTERY OF NATIVE HEART WITHOUT ANGINA PECTORIS: ICD-10-CM

## 2019-12-12 DIAGNOSIS — N13.8 BPH WITH OBSTRUCTION/LOWER URINARY TRACT SYMPTOMS: ICD-10-CM

## 2019-12-12 DIAGNOSIS — E87.6 HYPOKALEMIA: ICD-10-CM

## 2019-12-12 DIAGNOSIS — N40.1 BPH WITH OBSTRUCTION/LOWER URINARY TRACT SYMPTOMS: ICD-10-CM

## 2019-12-12 DIAGNOSIS — R55 SYNCOPE, UNSPECIFIED SYNCOPE TYPE: ICD-10-CM

## 2019-12-12 DIAGNOSIS — I10 BENIGN ESSENTIAL HTN: ICD-10-CM

## 2019-12-12 DIAGNOSIS — D64.9 ANEMIA, UNSPECIFIED TYPE: ICD-10-CM

## 2019-12-12 DIAGNOSIS — Z23 IMMUNIZATION DUE: Primary | ICD-10-CM

## 2019-12-12 LAB
ANION GAP SERPL CALCULATED.3IONS-SCNC: 7 MMOL/L (ref 4–13)
BUN SERPL-MCNC: 19 MG/DL (ref 5–25)
CALCIUM SERPL-MCNC: 8.5 MG/DL (ref 8.3–10.1)
CHLORIDE SERPL-SCNC: 108 MMOL/L (ref 100–108)
CO2 SERPL-SCNC: 25 MMOL/L (ref 21–32)
CREAT SERPL-MCNC: 1 MG/DL (ref 0.6–1.3)
GFR SERPL CREATININE-BSD FRML MDRD: 67 ML/MIN/1.73SQ M
GLUCOSE SERPL-MCNC: 102 MG/DL (ref 65–140)
POTASSIUM SERPL-SCNC: 3.9 MMOL/L (ref 3.5–5.3)
SODIUM SERPL-SCNC: 140 MMOL/L (ref 136–145)

## 2019-12-12 PROCEDURE — 36415 COLL VENOUS BLD VENIPUNCTURE: CPT | Performed by: FAMILY MEDICINE

## 2019-12-12 PROCEDURE — 99214 OFFICE O/P EST MOD 30 MIN: CPT | Performed by: FAMILY MEDICINE

## 2019-12-12 PROCEDURE — 80048 BASIC METABOLIC PNL TOTAL CA: CPT | Performed by: FAMILY MEDICINE

## 2019-12-12 RX ORDER — AMLODIPINE BESYLATE 2.5 MG/1
TABLET ORAL
Refills: 3 | COMMUNITY
Start: 2019-10-21 | End: 2019-12-12

## 2019-12-12 NOTE — PATIENT INSTRUCTIONS
Eliminate the amlodipine at this time  Monitor your blood pressure we are trying to keep it below 140/90 if it rises above they are consistently let me know  WE RECOMMEND GETTING THE 2- DOSE SHINGLES VACCINE ([address]) FROM YOUR PHARMACY  CHECK WITH THEM ON THE COST  YOU SHOULD HAVE THIS IF OVER AGE 48, EVEN IF YOU HAVE HAD THE ORIGINAL VACCINE (ZOSTRIX)

## 2019-12-12 NOTE — PROGRESS NOTES
Assessment/Plan:    No problem-specific Assessment & Plan notes found for this encounter  Diagnoses and all orders for this visit:    Immunization due    Benign essential HTN    Coronary artery disease involving native coronary artery of native heart without angina pectoris    Syncope, unspecified syncope type    BPH with obstruction/lower urinary tract symptoms    Anemia, unspecified type    Hypokalemia    Other orders  -     Cancel: influenza vaccine, [id number], high-dose, PF 0 5 mL (FLUZONE HIGH-DOSE)  -     Discontinue: amLODIPine (NORVASC) 2 5 mg tablet          Subjective:      Patient ID: Rohit Novak is a 80 y o  male  PATIENT RETURNS FOR FOLLOW-UP OF CHRONIC MEDICAL CONDITIONS  NO HOSPITAL STAYS OR EMERGENCY VISITS RECENTLY  MEDS WERE REVIEWED AND NO SIDE EFFECTS  NO NEW ISSUES  UNLESS NOTED BELOW  NO NEW MEDICAL PROVIDER REPORTED  THE CHRONIC DISEASES LISTED ABOVE ARE STABLE AND UNCHANGED/ THE PLAN OF CARE FOR THOSE WILL REMAIN UNCHANGED UNLESS NOTED BELOW  Still occasional orthostatic sx             The following portions of the patient's history were reviewed and updated as appropriate: allergies, current medications, past family history, past medical history, past social history, past surgical history and problem list     Review of Systems   Constitutional: Negative for activity change and appetite change  HENT: Negative for trouble swallowing  Eyes: Negative for visual disturbance  Respiratory: Negative for cough and shortness of breath  Cardiovascular: Negative for chest pain, palpitations and leg swelling  Gastrointestinal: Negative for abdominal pain and blood in stool  Endocrine: Negative for polyuria  Genitourinary: Negative for difficulty urinating and hematuria  Skin: Negative for rash  Neurological: Negative for dizziness  Psychiatric/Behavioral: Negative for behavioral problems           Objective:  Vitals:    12/12/19 1320   BP: 110/80   BP Location: Left arm   Patient Position: Sitting   Cuff Size: Adult   Pulse: 61   Temp: 98 °F (36 7 °C)   TempSrc: Temporal   SpO2: 96%   Weight: 76 7 kg (169 lb 1 6 oz)   Height: 5' 10" (1 778 m)      Physical Exam   Constitutional: He appears well-developed and well-nourished  HENT:   Head: Normocephalic and atraumatic  Eyes: Conjunctivae are normal    Neck: Neck supple  No thyromegaly present  Cardiovascular: Normal rate, regular rhythm, normal heart sounds and intact distal pulses  No murmur heard  Pulmonary/Chest: Effort normal and breath sounds normal  No respiratory distress  Musculoskeletal: He exhibits no edema  Lymphadenopathy:     He has no cervical adenopathy  Skin: Skin is warm and dry  Psychiatric: He has a normal mood and affect  His behavior is normal          Patient's chronic problems that were reviewed today are stable  Recent hospital stays reviewed  Recent labs and imaging reviewed  Recent visits to other providers reviewed  Meds reviewed and no changes made  Appropriate labs and imaging were ordered  Preventive measures appropriate for age and sex were reviewed with patient  Immunizations were updated as appropriate

## 2020-01-24 DIAGNOSIS — E78.5 HYPERLIPIDEMIA, UNSPECIFIED HYPERLIPIDEMIA TYPE: ICD-10-CM

## 2020-01-25 RX ORDER — SIMVASTATIN 20 MG
20 TABLET ORAL
Qty: 90 TABLET | Refills: 3 | Status: SHIPPED | OUTPATIENT
Start: 2020-01-25 | End: 2021-01-25 | Stop reason: SDUPTHER

## 2020-02-12 ENCOUNTER — OFFICE VISIT (OUTPATIENT)
Dept: UROLOGY | Facility: AMBULATORY SURGERY CENTER | Age: 85
End: 2020-02-12
Payer: MEDICARE

## 2020-02-12 VITALS
BODY MASS INDEX: 24.2 KG/M2 | DIASTOLIC BLOOD PRESSURE: 90 MMHG | HEIGHT: 70 IN | SYSTOLIC BLOOD PRESSURE: 168 MMHG | WEIGHT: 169 LBS | HEART RATE: 86 BPM

## 2020-02-12 DIAGNOSIS — N40.1 BPH WITH OBSTRUCTION/LOWER URINARY TRACT SYMPTOMS: Primary | ICD-10-CM

## 2020-02-12 DIAGNOSIS — N13.8 BPH WITH OBSTRUCTION/LOWER URINARY TRACT SYMPTOMS: Primary | ICD-10-CM

## 2020-02-12 LAB — POST-VOID RESIDUAL VOLUME, ML POC: 88 ML

## 2020-02-12 PROCEDURE — 51798 US URINE CAPACITY MEASURE: CPT | Performed by: NURSE PRACTITIONER

## 2020-02-12 PROCEDURE — 4040F PNEUMOC VAC/ADMIN/RCVD: CPT | Performed by: NURSE PRACTITIONER

## 2020-02-12 PROCEDURE — 99213 OFFICE O/P EST LOW 20 MIN: CPT | Performed by: NURSE PRACTITIONER

## 2020-02-12 PROCEDURE — 3080F DIAST BP >= 90 MM HG: CPT | Performed by: NURSE PRACTITIONER

## 2020-02-12 PROCEDURE — 1036F TOBACCO NON-USER: CPT | Performed by: NURSE PRACTITIONER

## 2020-02-12 PROCEDURE — 3077F SYST BP >= 140 MM HG: CPT | Performed by: NURSE PRACTITIONER

## 2020-02-12 PROCEDURE — 1160F RVW MEDS BY RX/DR IN RCRD: CPT | Performed by: NURSE PRACTITIONER

## 2020-02-12 NOTE — PROGRESS NOTES
2/12/2020      Chief Complaint   Patient presents with    Benign Prostatic Hypertrophy     6 month follow up      Assessment and Plan    80 y o  male managed by Dr Flynn Wadsworth    1  Benign prostatic hyperplasia  · Referral for physical therapy for pelvic floor therapy provided  · Schedule cystoscopy  · Bladder scan PVR in office 88 mL      History of Present Illness  Chris Done is a 80 y o  male here for follow up evaluation of  urinary symptoms secondary to benign prostatic hyperplasia  At his prior office visit dated 03/06/2019, patient complained urinary incontinence, urinary frequency, occasional weak urinary stream and postvoid dribbling  Patient also complained the ineffectiveness of tamsulosin and discontinued use  Pt reports not taking Finasteride and not starting Pelvic floor therapy   He reports while he is at home he goes to the bathroom every 1-2 hours but 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[employer] he is able to go all morning without using the bathroom  The patient reports doing Kegel exercises intermittently  Patient admits to not drinking much water throughout the day but instead, drinks hot or cold tea throughout the course of the day  Patient currently denies dysuria, hematuria and sensation of incomplete bladder emptying  He reports his urinary symptoms are not terribly bothersome and for the most part, is satisfied with his urine health at this time        Review of Systems   Constitutional: Negative for chills and fever  Respiratory: Negative for cough and shortness of breath  Cardiovascular: Negative for chest pain  Gastrointestinal: Negative for abdominal distention, abdominal pain, blood in stool, nausea and vomiting  Genitourinary: Positive for frequency  Negative for difficulty urinating, dysuria, enuresis, flank pain, hematuria and urgency  Skin: Negative for rash  Neurological: Negative for dizziness         Urinary Incontinence Screening      Most Recent Value Urinary Incontinence   Urinary Incontinence? Yes [sometimes]   Incomplete emptying? No   Urinary frequency? Yes   Urinary urgency? Yes [sometimes]   Urinary hesitancy? Yes [sometimes]   Dysuria (painful difficult urination)? No   Nocturia (waking up to use the bathroom)? Yes [4-5 times]   Straining (having to push to go)? Yes [sometimes]   Weak stream?  Yes [sometimes]   Intermittent stream?  Yes [sometimes]   Post void dribbling?   Yes                 Past Medical History  Past Medical History:   Diagnosis Date    Anemia, iron deficiency     Drug Therapy, poor absorbtion documented    Appendicitis     Arthritis 1/25/2019    Cardiac disease     Colon cancer ([facility] [address] ) 12/2013    Resected: BREANNA    Congenital nystagmus     Left    Hyperlipidemia     Hypertension     Inguinal hernia     Sciatica 2010    resolved from problem list-2011       Past Social History  Past Surgical History:   Procedure Laterality Date    APPENDECTOMY      Appendicitis    CHOLECYSTECTOMY      COLON SIGMOID RESECTION  12/2013    COLON SURGERY      CORONARY ARTERY BYPASS GRAFT      x 2    CORONARY ARTERY BYPASS GRAFT      x2    CORONARY ARTERY BYPASS GRAFT      CORONARY ARTERY BYPASS GRAFT      INGUINAL HERNIA REPAIR      SHOULDER ARTHROCENTESIS Right     sub-acromial bursa area     Social History     Tobacco Use   Smoking Status Never Smoker   Smokeless Tobacco Never Used       Past Family History  Family History   Problem Relation Age of Onset    Hypertension Father     Heart disease Father     Heart disease Mother     Cancer Sister     Cancer Brother        Past Social history  Social History     Socioeconomic History    Marital status: /Civil Union     Spouse name: Not on file    Number of children: Not on file    Years of education: Not on file    Highest education level: Not on file   Occupational History    Occupation: Retired     Comment: Volunteer   Social Needs    Financial resource strain: Not on file    Food insecurity:     Worry: Not on file     Inability: Not on file    Transportation needs:     Medical: Not on file     Non-medical: Not on file   Tobacco Use    Smoking status: Never Smoker    Smokeless tobacco: Never Used   Substance and Sexual Activity    Alcohol use: No    Drug use: No    Sexual activity: Not on file   Lifestyle    Physical activity:     Days per week: Not on file     Minutes per session: Not on file    Stress: Not on file   Relationships    Social connections:     Talks on phone: Not on file     Gets together: Not on file     Attends [employer] service: Not on file     Active member of club or organization: Not on file     Attends meetings of clubs or organizations: Not on file     Relationship status: Not on file    Intimate partner violence:     Fear of current or ex partner: Not on file     Emotionally abused: Not on file     Physically abused: Not on file     Forced sexual activity: Not on file   Other Topics Concern    Not on file   Social History Narrative    Not on file       Current Medications  Current Outpatient Medications   Medication Sig Dispense Refill    aspirin 81 mg chewable tablet Chew 81 mg      folic acid (FOLVITE) 346 MCG tablet Take 400 mcg by mouth daily      lisinopril (ZESTRIL) 20 mg tablet Take 1 tablet (20 mg total) by mouth daily 90 tablet 3    metoprolol tartrate (LOPRESSOR) 50 mg tablet Take 1 tablet (50 mg total) by mouth every 12 (twelve) hours 180 tablet 3    Multiple Vitamins-Minerals (MULTIVITAL-M PO) Take 1 tablet by mouth      omeprazole (PriLOSEC) 20 mg delayed release capsule Take 20 mg by mouth daily      simvastatin (ZOCOR) 20 mg tablet Take 1 tablet (20 mg total) by mouth daily at bedtime 90 tablet 3    triamcinolone (KENALOG) 0 1 % cream apply by topical route 2 times every day a thin layer to the affected area(s) for scalp ithing      hydrochlorothiazide (HYDRODIURIL) 12 5 mg tablet Use 1 three times per week for swelling (Patient not taking: Reported on 2/12/2020) 90 tablet 3    NITROGLYCERIN ER PO place 1 tablet by sublingual route at the 1st sign of attack; may repeat every 5 min until relief; if pain persists after 3 tablets in 15 min, prompt medical attention is recommended       No current facility-administered medications for this visit  Allergies  Allergies   Allergen Reactions    Pregabalin      Other reaction(s): M/S CHANGE         The following portions of the patient's history were reviewed and updated as appropriate: allergies, current medications, past medical history, past social history, past surgical history and problem list       Vitals  Vitals:    02/12/20 1232   BP: 168/90   BP Location: Left arm   Patient Position: Sitting   Cuff Size: Adult   Pulse: 86   Weight: 76 7 kg (169 lb)   Height: 5' 10" (1 778 m)       Physical Exam  Physical Exam   Constitutional: He is oriented to person, place, and time  He appears well-developed and well-nourished  No distress  Pulmonary/Chest: Effort normal    Musculoskeletal: Normal range of motion  Neurological: He is alert and oriented to person, place, and time  Skin: Skin is warm  Vitals reviewed      Results  Recent Results (from the past 1 hour(s))   POCT Measure PVR    Collection Time: 02/12/20 12:37 PM   Result Value Ref Range    POST-VOID RESIDUAL VOLUME, ML POC 88 mL     No results found for: PSA  Lab Results   Component Value Date    GLUCOSE 109 (H) 06/18/2018    CALCIUM 8 5 12/12/2019     06/18/2018    K 3 9 12/12/2019    CO2 25 12/12/2019     12/12/2019    BUN 19 12/12/2019    CREATININE 1 00 12/12/2019     Lab Results   Component Value Date    WBC 6 31 06/21/2019    HGB 14 0 06/21/2019    HCT 43 0 06/21/2019    MCV 97 06/21/2019     06/21/2019       Orders  Orders Placed This Encounter   Procedures    Ambulatory referral to Physical Therapy     Standing Status:   Future     Standing Expiration Date:   2/12/2021 Referral Priority:   Routine     Referral Type:   Physical Therapy     Referral Reason:   Specialty Services Required     Requested Specialty:   Physical Therapy     Number of Visits Requested:   1     Expiration Date:   2/12/2021    POCT Measure PVR    Cystoscopy     Standing Status:   Future     Standing Expiration Date:   2/12/2021       JEFF Oh

## 2020-02-14 ENCOUNTER — TREATMENT (OUTPATIENT)
Dept: FAMILY MEDICINE CLINIC | Facility: CLINIC | Age: 85
End: 2020-02-14

## 2020-02-14 DIAGNOSIS — I10 BENIGN ESSENTIAL HTN: Primary | ICD-10-CM

## 2020-02-14 RX ORDER — AMLODIPINE BESYLATE 2.5 MG/1
2.5 TABLET ORAL DAILY
Qty: 90 TABLET | Refills: 3 | Status: SHIPPED | OUTPATIENT
Start: 2020-02-14 | End: 2021-03-08 | Stop reason: SDUPTHER

## 2020-03-04 NOTE — PROGRESS NOTES
PT Evaluation     Today's date: 3/6/2020  Patient name: Enzo Brush  : 1932  MRN: [id number]  Referring provider: JEFF Lemus  Dx:   Encounter Diagnosis     ICD-10-CM    1  Post-void dribbling N39 43    2  BPH with obstruction/lower urinary tract symptoms N40 1 Ambulatory referral to Physical Therapy    N13 8    3  Pelvic floor weakness N81 89                   Assessment  Assessment details: Enzo Brush is a 80 y o  male who presents with concerns of pelvic floor weakness and post-void dribble  Patient presents with the below outlined deficits and is appropriate for skilled physical therapy in order to address deficits and ultimately meet goal of independent self management of condition  Therapeutic activities performed upon examination included education regarding pelvic floor anatomy, explanation of exam technique, explanation of exam findings and discussion of treatment plan as well as expectations of the patient to emphasize the importance of compliance and adherence to physical therapy visits  Impairments: impaired physical strength and lacks appropriate home exercise program  Understanding of Dx/Px/POC: good   Prognosis: good    Goals  STGs to be met in 3 weeks:  * Patient will be compliant with introductory HEP as prescribed  * Patient presents with good understanding of pelvic floor protective strategies to reduce intra-abdominal pressure against pelvic organs  LTGs to be met by discharge:  * Patient will report no post-void dribble following urination at all  * Normalize sEMG findings to indicate strength average > 12uV and resting average < 2 0uV  * Demonstrates correct isolation and relaxation of pelvic floor to palpation without overflow from global stabilizers  * Presents with improved nocturia to 1-2 toiletings/night for more thorough sleep     * Patient will use pelvic floor muscles correctly during functional ADLs such as coughing, sneezing, lifting and exercise activities to avoid excessive IAP and PFM strain  * Patient will be compliant with comprehensive home exercise program for self management of condition  Plan  Patient would benefit from: skilled physical therapy  Referral necessary: No  Planned modality interventions: biofeedback  Planned therapy interventions: neuromuscular re-education, patient education, therapeutic activities, therapeutic exercise, home exercise program and strengthening  Frequency: 1x week  Duration in weeks: 12  Plan of Care beginning date: 3/6/2020  Plan of Care expiration date: 5/29/2020  Treatment plan discussed with: patient        PT Pelvic Floor Subjective:   History of Present Illness:   Patient reports that for the past year or so he has noticed a dribble "down my pants " He is not wearing  Daily occurrence with every void  He notes that his does not occur with nighttime voiding  Dr Abhishek Andre is doing a cystoscopy on 4/16/20  Social Support:     Lives in:  [address] [place] [address] with:  Spouse    Relationship status: /committed  Diet and Exercise:    Diet:balanced nutrition    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    Not exercising due to: lack of time  Bladder Function:     Voiding Difficulties positive for: urgency      Voiding Difficulties comments:     Voiding frequency: every 1-2 hours and every 3-4 hours    Urinary leakage aggravated by: post-void dribble    Nocturia (episodes per night): 3 and 2    Intake (ounces): Intake (ounces) comment: Juice - 8 oz  Milk on cereal  Herbal tea - 16 oz  Iced tea - 8 oz at lunch  Rare water  Occ   Wine   Incontinence Management:     Pads/Diaper Use:  None  Bowel Function:     Bowel frequency: daily    [place] Stool Scale: type 4 and type 3  Diagnostic Tests:     None    Treatments:     None    Patient Goals:     Patient goals for therapy:  Improved quality of life, improved comfort and improved bladder or bowel function      Objective     Static Posture     Comments  Mild forward head and flattened lumbar lordosis  Pelvic Floor Exam   Position: side-lying    Skin inspection:   Additional skin inspection details: No discharge, irritation, organ prolapse or skin breakdown evident      Visual Inspection of Perineum:   Excursion of perineal body in cephalad direction with contraction of pelvic floor muscles (PFM): weak  Excursion of perineal body in caudal direction with relaxation of pelvic floor muscles (PFM): good   Involuntary contraction with coughing: yes  Involuntary relaxation with bearing down: yes    Pelvic Floor Muscle Exam     Breathing pattern with contraction: holding breath  Pelvic floor muscle relaxation is complete       PERFECT Score   Power right: 2/5  Power left: 2/5  Endurance (seconds to max): 4  Repetitions (before fatigue): 3  Fast flicks (in 10 seconds): 7    SMEG Biofeedback   to be assessed next treatment               Precautions: standard      Manual                                                                                   Exercise Diary  3/6                         abd             add             SLR                                                                                                                                                                                                                                 Modalities  3/6            biofeedback nv

## 2020-03-06 ENCOUNTER — EVALUATION (OUTPATIENT)
Dept: PHYSICAL THERAPY | Facility: REHABILITATION | Age: 85
End: 2020-03-06
Payer: MEDICARE

## 2020-03-06 DIAGNOSIS — N81.89 PELVIC FLOOR WEAKNESS: ICD-10-CM

## 2020-03-06 DIAGNOSIS — N13.8 BPH WITH OBSTRUCTION/LOWER URINARY TRACT SYMPTOMS: ICD-10-CM

## 2020-03-06 DIAGNOSIS — N39.43 POST-VOID DRIBBLING: Primary | ICD-10-CM

## 2020-03-06 DIAGNOSIS — N40.1 BPH WITH OBSTRUCTION/LOWER URINARY TRACT SYMPTOMS: ICD-10-CM

## 2020-03-06 PROCEDURE — 97530 THERAPEUTIC ACTIVITIES: CPT | Performed by: PHYSICAL THERAPIST

## 2020-03-06 PROCEDURE — 97162 PT EVAL MOD COMPLEX 30 MIN: CPT | Performed by: PHYSICAL THERAPIST

## 2020-03-11 ENCOUNTER — OFFICE VISIT (OUTPATIENT)
Dept: PHYSICAL THERAPY | Facility: REHABILITATION | Age: 85
End: 2020-03-11
Payer: MEDICARE

## 2020-03-11 DIAGNOSIS — N39.43 POST-VOID DRIBBLING: ICD-10-CM

## 2020-03-11 DIAGNOSIS — N81.89 PELVIC FLOOR WEAKNESS: ICD-10-CM

## 2020-03-11 DIAGNOSIS — N40.1 BPH WITH OBSTRUCTION/LOWER URINARY TRACT SYMPTOMS: Primary | ICD-10-CM

## 2020-03-11 DIAGNOSIS — N13.8 BPH WITH OBSTRUCTION/LOWER URINARY TRACT SYMPTOMS: Primary | ICD-10-CM

## 2020-03-11 PROCEDURE — 90913 BFB TRAINING EA ADDL 15 MIN: CPT | Performed by: PHYSICAL THERAPIST

## 2020-03-11 PROCEDURE — 90912 BFB TRAINING 1ST 15 MIN: CPT | Performed by: PHYSICAL THERAPIST

## 2020-03-11 NOTE — PROGRESS NOTES
Daily Note (discharged on 20 due to time lapse and COVID)    Today's date: 3/11/2020  Patient name: Vivien Garcia  : 1932  MRN: [id number]  Referring provider: JEFF Siegel  Dx:   Encounter Diagnosis     ICD-10-CM    1  BPH with obstruction/lower urinary tract symptoms N40 1     N13 8    2  Post-void dribbling N39 43    3  Pelvic floor weakness N81 89      From evaluation: Patient reports that for the past year or so he has noticed a dribble "down my pants " He is not wearing  Daily occurrence with every void  He notes that his does not occur with nighttime voiding  Dr Mary Walsh is doing a cystoscopy on 20  Subjective: Patient offers no new complaints  Objective: See treatment diary below    Biofeedback performed in supine hooklying  Patient presents with weak tone and poor holding endurance  Performed 5 second active PFM contractions followed by 10 seconds of rest for 10 repetitions  Muscle activity during work phase measured 3 2 uV on average with the goal being > 12 0uV and muscle activity during rest phase measured 1 1 uV on average with the goal being < 2 5uV  Issued HEP of 5 second holds, 10 seconds of rest to perform 10 times in supine position with emphasis on work phase  This is to be performed 4 times per day  Assessment: Tolerated treatment well  Patient would benefit from continued PT      Plan: Continue per plan of care        Precautions: standard      Manual                                                                                   Exercise Diary  3/6                         abd             add             SLR                                                                                                                                                                                                                                 Modalities  3/6 3/11           biofeedback nv 55' see above

## 2020-03-25 ENCOUNTER — APPOINTMENT (OUTPATIENT)
Dept: PHYSICAL THERAPY | Facility: REHABILITATION | Age: 85
End: 2020-03-25
Payer: MEDICARE

## 2020-03-26 ENCOUNTER — APPOINTMENT (OUTPATIENT)
Dept: PHYSICAL THERAPY | Facility: REHABILITATION | Age: 85
End: 2020-03-26
Payer: MEDICARE

## 2020-05-08 ENCOUNTER — TELEPHONE (OUTPATIENT)
Dept: UROLOGY | Facility: MEDICAL CENTER | Age: 85
End: 2020-05-08

## 2020-06-18 DIAGNOSIS — I10 BENIGN ESSENTIAL HTN: ICD-10-CM

## 2020-06-19 ENCOUNTER — OFFICE VISIT (OUTPATIENT)
Dept: FAMILY MEDICINE CLINIC | Facility: CLINIC | Age: 85
End: 2020-06-19
Payer: MEDICARE

## 2020-06-19 VITALS
SYSTOLIC BLOOD PRESSURE: 140 MMHG | DIASTOLIC BLOOD PRESSURE: 80 MMHG | TEMPERATURE: 97.3 F | HEIGHT: 70 IN | BODY MASS INDEX: 23.43 KG/M2 | WEIGHT: 163.7 LBS

## 2020-06-19 DIAGNOSIS — I10 BENIGN ESSENTIAL HTN: Primary | ICD-10-CM

## 2020-06-19 DIAGNOSIS — I25.10 CORONARY ARTERY DISEASE INVOLVING NATIVE CORONARY ARTERY OF NATIVE HEART WITHOUT ANGINA PECTORIS: ICD-10-CM

## 2020-06-19 DIAGNOSIS — K21.9 GASTROESOPHAGEAL REFLUX DISEASE WITHOUT ESOPHAGITIS: Chronic | ICD-10-CM

## 2020-06-19 DIAGNOSIS — E78.5 DYSLIPIDEMIA: ICD-10-CM

## 2020-06-19 DIAGNOSIS — D64.9 ANEMIA, UNSPECIFIED TYPE: ICD-10-CM

## 2020-06-19 LAB
ALBUMIN SERPL BCP-MCNC: 3.7 G/DL (ref 3.5–5)
ALP SERPL-CCNC: 123 U/L (ref 46–116)
ALT SERPL W P-5'-P-CCNC: 37 U/L (ref 12–78)
ANION GAP SERPL CALCULATED.3IONS-SCNC: 7 MMOL/L (ref 4–13)
AST SERPL W P-5'-P-CCNC: 29 U/L (ref 5–45)
BASOPHILS # BLD AUTO: 0.02 THOUSANDS/ΜL (ref 0–0.1)
BASOPHILS NFR BLD AUTO: 0 % (ref 0–1)
BILIRUB SERPL-MCNC: 0.55 MG/DL (ref 0.2–1)
BUN SERPL-MCNC: 17 MG/DL (ref 5–25)
CALCIUM SERPL-MCNC: 7.9 MG/DL (ref 8.3–10.1)
CHLORIDE SERPL-SCNC: 106 MMOL/L (ref 100–108)
CO2 SERPL-SCNC: 25 MMOL/L (ref 21–32)
CREAT SERPL-MCNC: 0.89 MG/DL (ref 0.6–1.3)
EOSINOPHIL # BLD AUTO: 0.33 THOUSAND/ΜL (ref 0–0.61)
EOSINOPHIL NFR BLD AUTO: 5 % (ref 0–6)
ERYTHROCYTE [DISTWIDTH] IN BLOOD BY AUTOMATED COUNT: 12.8 % (ref 11.6–15.1)
GFR SERPL CREATININE-BSD FRML MDRD: 76 ML/MIN/1.73SQ M
GLUCOSE SERPL-MCNC: 95 MG/DL (ref 65–140)
HCT VFR BLD AUTO: 40.9 % (ref 36.5–49.3)
HGB BLD-MCNC: 13.1 G/DL (ref 12–17)
IMM GRANULOCYTES # BLD AUTO: 0.02 THOUSAND/UL (ref 0–0.2)
IMM GRANULOCYTES NFR BLD AUTO: 0 % (ref 0–2)
LYMPHOCYTES # BLD AUTO: 0.94 THOUSANDS/ΜL (ref 0.6–4.47)
LYMPHOCYTES NFR BLD AUTO: 14 % (ref 14–44)
MCH RBC QN AUTO: 32.3 PG (ref 26.8–34.3)
MCHC RBC AUTO-ENTMCNC: 32 G/DL (ref 31.4–37.4)
MCV RBC AUTO: 101 FL (ref 82–98)
MONOCYTES # BLD AUTO: 0.86 THOUSAND/ΜL (ref 0.17–1.22)
MONOCYTES NFR BLD AUTO: 13 % (ref 4–12)
NEUTROPHILS # BLD AUTO: 4.37 THOUSANDS/ΜL (ref 1.85–7.62)
NEUTS SEG NFR BLD AUTO: 68 % (ref 43–75)
NRBC BLD AUTO-RTO: 0 /100 WBCS
PLATELET # BLD AUTO: 171 THOUSANDS/UL (ref 149–390)
PMV BLD AUTO: 10.9 FL (ref 8.9–12.7)
POTASSIUM SERPL-SCNC: 3.8 MMOL/L (ref 3.5–5.3)
PROT SERPL-MCNC: 6.3 G/DL (ref 6.4–8.2)
RBC # BLD AUTO: 4.05 MILLION/UL (ref 3.88–5.62)
SODIUM SERPL-SCNC: 138 MMOL/L (ref 136–145)
WBC # BLD AUTO: 6.54 THOUSAND/UL (ref 4.31–10.16)

## 2020-06-19 PROCEDURE — 99214 OFFICE O/P EST MOD 30 MIN: CPT | Performed by: FAMILY MEDICINE

## 2020-06-19 PROCEDURE — 80053 COMPREHEN METABOLIC PANEL: CPT | Performed by: FAMILY MEDICINE

## 2020-06-19 PROCEDURE — 4040F PNEUMOC VAC/ADMIN/RCVD: CPT | Performed by: FAMILY MEDICINE

## 2020-06-19 PROCEDURE — 1160F RVW MEDS BY RX/DR IN RCRD: CPT | Performed by: FAMILY MEDICINE

## 2020-06-19 PROCEDURE — 1036F TOBACCO NON-USER: CPT | Performed by: FAMILY MEDICINE

## 2020-06-19 PROCEDURE — 36415 COLL VENOUS BLD VENIPUNCTURE: CPT | Performed by: FAMILY MEDICINE

## 2020-06-19 PROCEDURE — 85025 COMPLETE CBC W/AUTO DIFF WBC: CPT | Performed by: FAMILY MEDICINE

## 2020-06-19 PROCEDURE — 3077F SYST BP >= 140 MM HG: CPT | Performed by: FAMILY MEDICINE

## 2020-06-19 PROCEDURE — 3079F DIAST BP 80-89 MM HG: CPT | Performed by: FAMILY MEDICINE

## 2020-06-19 PROCEDURE — 3008F BODY MASS INDEX DOCD: CPT | Performed by: FAMILY MEDICINE

## 2020-06-19 RX ORDER — METOPROLOL TARTRATE 50 MG/1
50 TABLET, FILM COATED ORAL EVERY 12 HOURS SCHEDULED
Qty: 180 TABLET | Refills: 3 | Status: SHIPPED | OUTPATIENT
Start: 2020-06-19 | End: 2021-06-23 | Stop reason: SDUPTHER

## 2020-07-15 ENCOUNTER — PROCEDURE VISIT (OUTPATIENT)
Dept: UROLOGY | Facility: AMBULATORY SURGERY CENTER | Age: 85
End: 2020-07-15
Payer: MEDICARE

## 2020-07-15 VITALS
BODY MASS INDEX: 23.34 KG/M2 | HEART RATE: 85 BPM | TEMPERATURE: 98.2 F | HEIGHT: 70 IN | WEIGHT: 163 LBS | DIASTOLIC BLOOD PRESSURE: 72 MMHG | SYSTOLIC BLOOD PRESSURE: 132 MMHG

## 2020-07-15 DIAGNOSIS — N40.1 BPH WITH OBSTRUCTION/LOWER URINARY TRACT SYMPTOMS: Primary | ICD-10-CM

## 2020-07-15 DIAGNOSIS — N13.8 BPH WITH OBSTRUCTION/LOWER URINARY TRACT SYMPTOMS: Primary | ICD-10-CM

## 2020-07-15 LAB
SL AMB  POCT GLUCOSE, UA: NORMAL
SL AMB LEUKOCYTE ESTERASE,UA: NORMAL
SL AMB POCT BILIRUBIN,UA: NORMAL
SL AMB POCT BLOOD,UA: NORMAL
SL AMB POCT CLARITY,UA: CLEAR
SL AMB POCT COLOR,UA: YELLOW
SL AMB POCT KETONES,UA: NORMAL
SL AMB POCT NITRITE,UA: NORMAL
SL AMB POCT PH,UA: 6
SL AMB POCT SPECIFIC GRAVITY,UA: 1.01
SL AMB POCT URINE PROTEIN: NORMAL
SL AMB POCT UROBILINOGEN: 0.2

## 2020-07-15 PROCEDURE — 3078F DIAST BP <80 MM HG: CPT | Performed by: UROLOGY

## 2020-07-15 PROCEDURE — 99214 OFFICE O/P EST MOD 30 MIN: CPT | Performed by: UROLOGY

## 2020-07-15 PROCEDURE — 4040F PNEUMOC VAC/ADMIN/RCVD: CPT | Performed by: UROLOGY

## 2020-07-15 PROCEDURE — 3075F SYST BP GE 130 - 139MM HG: CPT | Performed by: UROLOGY

## 2020-07-15 PROCEDURE — 81002 URINALYSIS NONAUTO W/O SCOPE: CPT | Performed by: UROLOGY

## 2020-07-15 PROCEDURE — 3008F BODY MASS INDEX DOCD: CPT | Performed by: UROLOGY

## 2020-07-15 PROCEDURE — 1036F TOBACCO NON-USER: CPT | Performed by: UROLOGY

## 2020-07-15 PROCEDURE — 1160F RVW MEDS BY RX/DR IN RCRD: CPT | Performed by: UROLOGY

## 2020-07-15 NOTE — ASSESSMENT & PLAN NOTE
I decided to hold off on the cystoscopy as I do not think he would need any sort of surgical treatment  We instead discussed maneuvers to push on his perineum to help with postvoid dribbling as this is his biggest complaint  We also discussed the possibility of trying medications for overactive bladder for his other urinary symptoms but they are not bothersome enough for him to start another medication  He will follow up in 1 year

## 2020-07-15 NOTE — PROGRESS NOTES
Assessment/Plan:    BPH with obstruction/lower urinary tract symptoms  I decided to hold off on the cystoscopy as I do not think he would need any sort of surgical treatment  We instead discussed maneuvers to push on his perineum to help with postvoid dribbling as this is his biggest complaint  We also discussed the possibility of trying medications for overactive bladder for his other urinary symptoms but they are not bothersome enough for him to start another medication  He will follow up in 1 year  Diagnoses and all orders for this visit:    BPH with obstruction/lower urinary tract symptoms  -     POCT urine dip          Total visit time was 25 minutes of which over 50% was spent on counseling  Subjective:     Patient ID: Lalitha Smiley is a 80 y o  male    80-year-old male presents for follow-up of lower urinary tract symptoms  His main complaint is of postvoid dribbling  He also complains of urinary frequency, urgency with occasional mild urge incontinence, and nocturia  He was planned to undergo cystoscopy today  He has no other complaints  He did go for pelvic floor physical therapy but this was only be briefly due to the Coronavirus pandemic  He did not notice much of a difference after his physical therapy but again he only had a few appointments  The following portions of the patient's history were reviewed and updated as appropriate: allergies, current medications, past family history, past medical history, past social history, past surgical history and problem list     Review of Systems   Constitutional: Negative  HENT: Negative  Eyes: Negative  Respiratory: Negative  Cardiovascular: Negative  Gastrointestinal: Negative  Endocrine: Negative  Genitourinary:        As noted per HPI   Musculoskeletal: Negative  Skin: Negative  Allergic/Immunologic: Negative  Neurological: Negative  Hematological: Negative  Psychiatric/Behavioral: Negative  Objective:    Physical Exam   Constitutional: He is oriented to person, place, and time  He appears well-developed and well-nourished  Neck: Normal range of motion  Cardiovascular: Intact distal pulses  Pulmonary/Chest: Effort normal    Abdominal: Soft  Bowel sounds are normal  He exhibits no distension and no mass  There is no tenderness  There is no rebound and no guarding  Genitourinary: Penis normal    Musculoskeletal: Normal range of motion  Neurological: He is alert and oriented to person, place, and time  Skin: Skin is warm and dry  Psychiatric: He has a normal mood and affect  Vitals reviewed          Results  No results found for: PSA  Lab Results   Component Value Date    GLUCOSE 109 (H) 06/18/2018    CALCIUM 7 9 (L) 06/19/2020     06/18/2018    K 3 8 06/19/2020    CO2 25 06/19/2020     06/19/2020    BUN 17 06/19/2020    CREATININE 0 89 06/19/2020     Lab Results   Component Value Date    WBC 6 54 06/19/2020    HGB 13 1 06/19/2020    HCT 40 9 06/19/2020     (H) 06/19/2020     06/19/2020       Recent Results (from the past 1 hour(s))   POCT urine dip    Collection Time: 07/15/20  2:54 PM   Result Value Ref Range    LEUKOCYTE ESTERASE,UA -     NITRITE,UA -     SL AMB POCT UROBILINOGEN 0 2     POCT URINE PROTEIN -      PH,UA 6 0     BLOOD,UA -     SPECIFIC GRAVITY,UA 1 015     KETONES,UA -     BILIRUBIN,UA -     GLUCOSE, UA -      COLOR,UA yellow     CLARITY,UA clear    ]

## 2020-09-10 ENCOUNTER — TREATMENT (OUTPATIENT)
Dept: FAMILY MEDICINE CLINIC | Facility: CLINIC | Age: 85
End: 2020-09-10

## 2020-09-10 ENCOUNTER — CLINICAL SUPPORT (OUTPATIENT)
Dept: FAMILY MEDICINE CLINIC | Facility: CLINIC | Age: 85
End: 2020-09-10
Payer: MEDICARE

## 2020-09-10 DIAGNOSIS — N39.0 URINARY TRACT INFECTION WITHOUT HEMATURIA, SITE UNSPECIFIED: Primary | ICD-10-CM

## 2020-09-10 DIAGNOSIS — R30.0 DYSURIA: Primary | ICD-10-CM

## 2020-09-10 LAB
BILIRUB UR QL STRIP: NEGATIVE
CLARITY UR: CLEAR
COLOR UR: YELLOW
GLUCOSE UR STRIP-MCNC: NEGATIVE MG/DL
HGB UR QL STRIP.AUTO: NEGATIVE
KETONES UR STRIP-MCNC: NEGATIVE MG/DL
LEUKOCYTE ESTERASE UR QL STRIP: NEGATIVE
NITRITE UR QL STRIP: NEGATIVE
PH UR STRIP.AUTO: 6 [PH]
PROT UR STRIP-MCNC: NEGATIVE MG/DL
SL AMB  POCT GLUCOSE, UA: NEGATIVE
SL AMB LEUKOCYTE ESTERASE,UA: ABNORMAL
SL AMB POCT BILIRUBIN,UA: NEGATIVE
SL AMB POCT BLOOD,UA: NEGATIVE
SL AMB POCT CLARITY,UA: ABNORMAL
SL AMB POCT COLOR,UA: YELLOW
SL AMB POCT KETONES,UA: NEGATIVE
SL AMB POCT NITRITE,UA: NEGATIVE
SL AMB POCT PH,UA: 5
SL AMB POCT SPECIFIC GRAVITY,UA: 1.01
SL AMB POCT URINE PROTEIN: ABNORMAL
SL AMB POCT UROBILINOGEN: 0.2
SP GR UR STRIP.AUTO: 1.01 (ref 1–1.03)
UROBILINOGEN UR QL STRIP.AUTO: 0.2 E.U./DL

## 2020-09-10 PROCEDURE — 81002 URINALYSIS NONAUTO W/O SCOPE: CPT

## 2020-09-10 PROCEDURE — 81003 URINALYSIS AUTO W/O SCOPE: CPT | Performed by: FAMILY MEDICINE

## 2020-09-10 RX ORDER — SULFAMETHOXAZOLE AND TRIMETHOPRIM 800; 160 MG/1; MG/1
1 TABLET ORAL EVERY 12 HOURS SCHEDULED
Qty: 14 TABLET | Refills: 0 | Status: SHIPPED | OUTPATIENT
Start: 2020-09-10 | End: 2020-09-17

## 2020-10-15 ENCOUNTER — OFFICE VISIT (OUTPATIENT)
Dept: FAMILY MEDICINE CLINIC | Facility: CLINIC | Age: 85
End: 2020-10-15
Payer: MEDICARE

## 2020-10-15 VITALS
BODY MASS INDEX: 23.45 KG/M2 | OXYGEN SATURATION: 97 % | DIASTOLIC BLOOD PRESSURE: 80 MMHG | TEMPERATURE: 97.9 F | HEART RATE: 61 BPM | SYSTOLIC BLOOD PRESSURE: 132 MMHG | WEIGHT: 163.8 LBS | HEIGHT: 70 IN

## 2020-10-15 DIAGNOSIS — N40.1 BPH WITH OBSTRUCTION/LOWER URINARY TRACT SYMPTOMS: ICD-10-CM

## 2020-10-15 DIAGNOSIS — N13.8 BPH WITH OBSTRUCTION/LOWER URINARY TRACT SYMPTOMS: ICD-10-CM

## 2020-10-15 DIAGNOSIS — Z23 IMMUNIZATION DUE: Primary | ICD-10-CM

## 2020-10-15 DIAGNOSIS — L98.9 SKIN LESION: Primary | ICD-10-CM

## 2020-10-15 PROCEDURE — G0008 ADMIN INFLUENZA VIRUS VAC: HCPCS

## 2020-10-15 PROCEDURE — 1123F ACP DISCUSS/DSCN MKR DOCD: CPT

## 2020-10-15 PROCEDURE — G0439 PPPS, SUBSEQ VISIT: HCPCS | Performed by: FAMILY MEDICINE

## 2020-10-15 PROCEDURE — 99213 OFFICE O/P EST LOW 20 MIN: CPT | Performed by: FAMILY MEDICINE

## 2020-10-15 PROCEDURE — 90662 IIV NO PRSV INCREASED AG IM: CPT

## 2020-10-15 RX ORDER — TRIAMCINOLONE ACETONIDE 1 MG/G
CREAM TOPICAL
Qty: 30 G | Refills: 1 | Status: SHIPPED | OUTPATIENT
Start: 2020-10-15 | End: 2021-12-21

## 2020-10-15 RX ORDER — ALFUZOSIN HYDROCHLORIDE 10 MG/1
TABLET, EXTENDED RELEASE ORAL
Qty: 30 TABLET | Refills: 3 | Status: SHIPPED | OUTPATIENT
Start: 2020-10-15 | End: 2020-10-16

## 2020-10-15 RX ORDER — ALFUZOSIN HYDROCHLORIDE 10 MG/1
10 TABLET, EXTENDED RELEASE ORAL DAILY
Qty: 30 TABLET | Refills: 6 | Status: SHIPPED | OUTPATIENT
Start: 2020-10-15 | End: 2020-10-15

## 2020-10-16 RX ORDER — ALFUZOSIN HYDROCHLORIDE 10 MG/1
TABLET, EXTENDED RELEASE ORAL
Qty: 90 TABLET | Refills: 3 | Status: SHIPPED | OUTPATIENT
Start: 2020-10-16 | End: 2021-06-22 | Stop reason: ALTCHOICE

## 2020-11-06 DIAGNOSIS — I10 BENIGN ESSENTIAL HTN: ICD-10-CM

## 2020-11-07 RX ORDER — LISINOPRIL 20 MG/1
20 TABLET ORAL DAILY
Qty: 90 TABLET | Refills: 3 | Status: SHIPPED | OUTPATIENT
Start: 2020-11-07 | End: 2021-11-12 | Stop reason: SDUPTHER

## 2020-11-24 ENCOUNTER — TELEPHONE (OUTPATIENT)
Dept: FAMILY MEDICINE CLINIC | Facility: CLINIC | Age: 85
End: 2020-11-24

## 2020-12-18 ENCOUNTER — OFFICE VISIT (OUTPATIENT)
Dept: FAMILY MEDICINE CLINIC | Facility: CLINIC | Age: 85
End: 2020-12-18
Payer: MEDICARE

## 2020-12-18 VITALS
WEIGHT: 166 LBS | DIASTOLIC BLOOD PRESSURE: 80 MMHG | TEMPERATURE: 98 F | BODY MASS INDEX: 23.82 KG/M2 | HEART RATE: 67 BPM | SYSTOLIC BLOOD PRESSURE: 180 MMHG | RESPIRATION RATE: 18 BRPM | OXYGEN SATURATION: 98 %

## 2020-12-18 DIAGNOSIS — I25.10 CORONARY ARTERY DISEASE INVOLVING NATIVE CORONARY ARTERY OF NATIVE HEART WITHOUT ANGINA PECTORIS: ICD-10-CM

## 2020-12-18 DIAGNOSIS — E78.5 DYSLIPIDEMIA: ICD-10-CM

## 2020-12-18 DIAGNOSIS — I10 BENIGN ESSENTIAL HTN: ICD-10-CM

## 2020-12-18 DIAGNOSIS — I10 ESSENTIAL HYPERTENSION: ICD-10-CM

## 2020-12-18 DIAGNOSIS — K21.9 GASTROESOPHAGEAL REFLUX DISEASE WITHOUT ESOPHAGITIS: Chronic | ICD-10-CM

## 2020-12-18 DIAGNOSIS — D64.9 ANEMIA, UNSPECIFIED TYPE: ICD-10-CM

## 2020-12-18 DIAGNOSIS — N40.1 BPH WITH OBSTRUCTION/LOWER URINARY TRACT SYMPTOMS: Primary | ICD-10-CM

## 2020-12-18 DIAGNOSIS — N13.8 BPH WITH OBSTRUCTION/LOWER URINARY TRACT SYMPTOMS: Primary | ICD-10-CM

## 2020-12-18 PROCEDURE — 99214 OFFICE O/P EST MOD 30 MIN: CPT | Performed by: FAMILY MEDICINE

## 2021-01-06 ENCOUNTER — TELEPHONE (OUTPATIENT)
Dept: FAMILY MEDICINE CLINIC | Facility: CLINIC | Age: 86
End: 2021-01-06

## 2021-01-06 NOTE — TELEPHONE ENCOUNTER
jacob wants to know if he should get the covid vaccine? 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

## 2021-01-09 ENCOUNTER — IMMUNIZATIONS (OUTPATIENT)
Dept: FAMILY MEDICINE CLINIC | Facility: HOSPITAL | Age: 86
End: 2021-01-09

## 2021-01-09 DIAGNOSIS — Z23 ENCOUNTER FOR IMMUNIZATION: ICD-10-CM

## 2021-01-09 PROCEDURE — 0001A SARS-COV-2 / COVID-19 MRNA VACCINE (PFIZER-BIONTECH) 30 MCG: CPT

## 2021-01-09 PROCEDURE — 91300 SARS-COV-2 / COVID-19 MRNA VACCINE (PFIZER-BIONTECH) 30 MCG: CPT

## 2021-01-25 DIAGNOSIS — E78.5 HYPERLIPIDEMIA, UNSPECIFIED HYPERLIPIDEMIA TYPE: ICD-10-CM

## 2021-01-25 RX ORDER — SIMVASTATIN 20 MG
20 TABLET ORAL
Qty: 90 TABLET | Refills: 3 | Status: SHIPPED | OUTPATIENT
Start: 2021-01-25 | End: 2022-02-03 | Stop reason: SDUPTHER

## 2021-01-28 ENCOUNTER — IMMUNIZATIONS (OUTPATIENT)
Dept: FAMILY MEDICINE CLINIC | Facility: HOSPITAL | Age: 86
End: 2021-01-28

## 2021-01-28 DIAGNOSIS — Z23 ENCOUNTER FOR IMMUNIZATION: Primary | ICD-10-CM

## 2021-01-28 PROCEDURE — 0002A SARS-COV-2 / COVID-19 MRNA VACCINE (PFIZER-BIONTECH) 30 MCG: CPT

## 2021-01-28 PROCEDURE — 91300 SARS-COV-2 / COVID-19 MRNA VACCINE (PFIZER-BIONTECH) 30 MCG: CPT

## 2021-03-08 DIAGNOSIS — I10 BENIGN ESSENTIAL HTN: ICD-10-CM

## 2021-03-08 RX ORDER — AMLODIPINE BESYLATE 2.5 MG/1
2.5 TABLET ORAL DAILY
Qty: 90 TABLET | Refills: 3 | Status: SHIPPED | OUTPATIENT
Start: 2021-03-08 | End: 2022-03-31 | Stop reason: SDUPTHER

## 2021-05-05 ENCOUNTER — TELEPHONE (OUTPATIENT)
Dept: FAMILY MEDICINE CLINIC | Facility: CLINIC | Age: 86
End: 2021-05-05

## 2021-05-05 NOTE — TELEPHONE ENCOUNTER
Left message to reschedule patient's appointment for 61/8 with dr Miranda Lindquist, patient's wife as well

## 2021-05-07 ENCOUNTER — TELEPHONE (OUTPATIENT)
Dept: FAMILY MEDICINE CLINIC | Facility: CLINIC | Age: 86
End: 2021-05-07

## 2021-05-07 NOTE — TELEPHONE ENCOUNTER
Unfortunately a torn meniscus will not show up on the x-ray  Often these are small tears and will heal by themselves in a week or 2  I would recommend holding off on any x-rays or possibly MRIs until we see how he does in the next several weeks    Tylenol arthritis is good use for pain in the interim

## 2021-05-07 NOTE — TELEPHONE ENCOUNTER
Patient calling, hurt his knee in the night, went to chiropractor today and they stated he has a torn meniscus in the right knee and recommends patient call us for xray  Patient wondering if that can be ordered or if he should come to the office first  Please advise

## 2021-06-22 ENCOUNTER — OFFICE VISIT (OUTPATIENT)
Dept: FAMILY MEDICINE CLINIC | Facility: CLINIC | Age: 86
End: 2021-06-22
Payer: MEDICARE

## 2021-06-22 VITALS
SYSTOLIC BLOOD PRESSURE: 138 MMHG | DIASTOLIC BLOOD PRESSURE: 70 MMHG | HEIGHT: 70 IN | HEART RATE: 63 BPM | TEMPERATURE: 97.4 F | OXYGEN SATURATION: 99 % | WEIGHT: 161.4 LBS | BODY MASS INDEX: 23.11 KG/M2

## 2021-06-22 DIAGNOSIS — D64.9 ANEMIA, UNSPECIFIED TYPE: ICD-10-CM

## 2021-06-22 DIAGNOSIS — Z85.038 HISTORY OF COLON CANCER: ICD-10-CM

## 2021-06-22 DIAGNOSIS — N13.8 BPH WITH OBSTRUCTION/LOWER URINARY TRACT SYMPTOMS: ICD-10-CM

## 2021-06-22 DIAGNOSIS — K21.9 GASTROESOPHAGEAL REFLUX DISEASE WITHOUT ESOPHAGITIS: Primary | Chronic | ICD-10-CM

## 2021-06-22 DIAGNOSIS — I10 ESSENTIAL HYPERTENSION: ICD-10-CM

## 2021-06-22 DIAGNOSIS — N40.1 BPH WITH OBSTRUCTION/LOWER URINARY TRACT SYMPTOMS: ICD-10-CM

## 2021-06-22 DIAGNOSIS — I25.10 CORONARY ARTERY DISEASE INVOLVING NATIVE CORONARY ARTERY OF NATIVE HEART WITHOUT ANGINA PECTORIS: ICD-10-CM

## 2021-06-22 LAB
ALBUMIN SERPL BCP-MCNC: 3.8 G/DL (ref 3.5–5)
ALP SERPL-CCNC: 112 U/L (ref 46–116)
ALT SERPL W P-5'-P-CCNC: 38 U/L (ref 12–78)
ANION GAP SERPL CALCULATED.3IONS-SCNC: 4 MMOL/L (ref 4–13)
AST SERPL W P-5'-P-CCNC: 31 U/L (ref 5–45)
BASOPHILS # BLD AUTO: 0.02 THOUSANDS/ΜL (ref 0–0.1)
BASOPHILS NFR BLD AUTO: 0 % (ref 0–1)
BILIRUB SERPL-MCNC: 0.5 MG/DL (ref 0.2–1)
BUN SERPL-MCNC: 20 MG/DL (ref 5–25)
CALCIUM SERPL-MCNC: 8.5 MG/DL (ref 8.3–10.1)
CHLORIDE SERPL-SCNC: 109 MMOL/L (ref 100–108)
CO2 SERPL-SCNC: 28 MMOL/L (ref 21–32)
CREAT SERPL-MCNC: 0.95 MG/DL (ref 0.6–1.3)
EOSINOPHIL # BLD AUTO: 0.2 THOUSAND/ΜL (ref 0–0.61)
EOSINOPHIL NFR BLD AUTO: 3 % (ref 0–6)
ERYTHROCYTE [DISTWIDTH] IN BLOOD BY AUTOMATED COUNT: 12.4 % (ref 11.6–15.1)
GFR SERPL CREATININE-BSD FRML MDRD: 71 ML/MIN/1.73SQ M
GLUCOSE SERPL-MCNC: 86 MG/DL (ref 65–140)
HCT VFR BLD AUTO: 42 % (ref 36.5–49.3)
HGB BLD-MCNC: 13.8 G/DL (ref 12–17)
IMM GRANULOCYTES # BLD AUTO: 0.03 THOUSAND/UL (ref 0–0.2)
IMM GRANULOCYTES NFR BLD AUTO: 1 % (ref 0–2)
LYMPHOCYTES # BLD AUTO: 0.91 THOUSANDS/ΜL (ref 0.6–4.47)
LYMPHOCYTES NFR BLD AUTO: 15 % (ref 14–44)
MCH RBC QN AUTO: 32.8 PG (ref 26.8–34.3)
MCHC RBC AUTO-ENTMCNC: 32.9 G/DL (ref 31.4–37.4)
MCV RBC AUTO: 100 FL (ref 82–98)
MONOCYTES # BLD AUTO: 0.81 THOUSAND/ΜL (ref 0.17–1.22)
MONOCYTES NFR BLD AUTO: 14 % (ref 4–12)
NEUTROPHILS # BLD AUTO: 4.01 THOUSANDS/ΜL (ref 1.85–7.62)
NEUTS SEG NFR BLD AUTO: 67 % (ref 43–75)
NRBC BLD AUTO-RTO: 0 /100 WBCS
PLATELET # BLD AUTO: 182 THOUSANDS/UL (ref 149–390)
PMV BLD AUTO: 11 FL (ref 8.9–12.7)
POTASSIUM SERPL-SCNC: 4.3 MMOL/L (ref 3.5–5.3)
PROT SERPL-MCNC: 6.7 G/DL (ref 6.4–8.2)
RBC # BLD AUTO: 4.21 MILLION/UL (ref 3.88–5.62)
SODIUM SERPL-SCNC: 141 MMOL/L (ref 136–145)
WBC # BLD AUTO: 5.98 THOUSAND/UL (ref 4.31–10.16)

## 2021-06-22 PROCEDURE — 99214 OFFICE O/P EST MOD 30 MIN: CPT | Performed by: FAMILY MEDICINE

## 2021-06-22 PROCEDURE — 85025 COMPLETE CBC W/AUTO DIFF WBC: CPT | Performed by: FAMILY MEDICINE

## 2021-06-22 PROCEDURE — 80053 COMPREHEN METABOLIC PANEL: CPT | Performed by: FAMILY MEDICINE

## 2021-06-22 PROCEDURE — 36415 COLL VENOUS BLD VENIPUNCTURE: CPT | Performed by: FAMILY MEDICINE

## 2021-06-22 NOTE — PROGRESS NOTES
Assessment/Plan:    No problem-specific Assessment & Plan notes found for this encounter  Diagnoses and all orders for this visit:    Gastroesophageal reflux disease without esophagitis    Essential hypertension    Coronary artery disease involving native coronary artery of native heart without angina pectoris    BPH with obstruction/lower urinary tract symptoms    Anemia, unspecified type    History of colon cancer          Subjective:      Patient ID: Dacia Garnica is a 80 y o  male  PATIENT RETURNS FOR FOLLOW-UP OF CHRONIC MEDICAL CONDITIONS  NO HOSPITAL STAYS OR EMERGENCY VISITS RECENTLY  MEDS WERE REVIEWED AND NO SIDE EFFECTS  NO NEW ISSUES  UNLESS NOTED BELOW  NO NEW MEDICAL PROVIDER REPORTED  THE CHRONIC DISEASES LISTED ABOVE ARE STABLE AND UNCHANGED/ THE PLAN OF CARE FOR THOSE WILL REMAIN UNCHANGED UNLESS NOTED BELOW  Nocturia : sees Urologist         The following portions of the patient's history were reviewed and updated as appropriate: allergies, current medications, past family history, past medical history, past social history, past surgical history and problem list     Review of Systems   Constitutional: Negative for activity change and appetite change  HENT: Negative for trouble swallowing  Eyes: Negative for visual disturbance  Respiratory: Negative for cough and shortness of breath  Cardiovascular: Negative for chest pain, palpitations and leg swelling  Gastrointestinal: Negative for abdominal pain and blood in stool  Endocrine: Negative for polyuria  Genitourinary: Positive for frequency  Negative for difficulty urinating, dysuria and hematuria  Skin: Negative for rash  Neurological: Negative for dizziness  Psychiatric/Behavioral: Negative for behavioral problems           Objective:  Vitals:    06/22/21 1248   BP: 138/70   BP Location: Left arm   Patient Position: Sitting   Cuff Size: Large   Pulse: 63   Temp: (!) 97 4 °F (36 3 °C)   TempSrc: Temporal SpO2: 99%   Weight: 73 2 kg (161 lb 6 4 oz)   Height: 5' 10" (1 778 m)      Physical Exam  Constitutional:       Appearance: He is well-developed  HENT:      Head: Normocephalic and atraumatic  Eyes:      Conjunctiva/sclera: Conjunctivae normal    Neck:      Thyroid: No thyromegaly  Cardiovascular:      Rate and Rhythm: Normal rate and regular rhythm  Heart sounds: Normal heart sounds  No murmur heard  Pulmonary:      Effort: Pulmonary effort is normal  No respiratory distress  Breath sounds: Normal breath sounds  Musculoskeletal:      Cervical back: Neck supple  Lymphadenopathy:      Cervical: No cervical adenopathy  Skin:     General: Skin is warm and dry  Psychiatric:         Behavior: Behavior normal          Patient's chronic problems that were reviewed today are stable  Recent hospital stays reviewed  Recent labs and imaging reviewed  Recent visits to other providers reviewed  Meds reviewed and no changes made  Appropriate labs and imaging were ordered  Preventive measures appropriate for age and sex were reviewed with patient  Immunizations were updated as appropriate

## 2021-06-23 DIAGNOSIS — I10 BENIGN ESSENTIAL HTN: ICD-10-CM

## 2021-06-23 RX ORDER — METOPROLOL TARTRATE 50 MG/1
50 TABLET, FILM COATED ORAL EVERY 12 HOURS SCHEDULED
Qty: 180 TABLET | Refills: 3 | Status: SHIPPED | OUTPATIENT
Start: 2021-06-23 | End: 2022-06-29

## 2021-08-04 ENCOUNTER — OFFICE VISIT (OUTPATIENT)
Dept: UROLOGY | Facility: AMBULATORY SURGERY CENTER | Age: 86
End: 2021-08-04
Payer: MEDICARE

## 2021-08-04 VITALS
HEART RATE: 74 BPM | WEIGHT: 161 LBS | BODY MASS INDEX: 23.1 KG/M2 | DIASTOLIC BLOOD PRESSURE: 80 MMHG | SYSTOLIC BLOOD PRESSURE: 132 MMHG

## 2021-08-04 DIAGNOSIS — N40.1 BPH WITH OBSTRUCTION/LOWER URINARY TRACT SYMPTOMS: ICD-10-CM

## 2021-08-04 DIAGNOSIS — R32 URINARY INCONTINENCE, UNSPECIFIED TYPE: ICD-10-CM

## 2021-08-04 DIAGNOSIS — N13.8 BPH WITH OBSTRUCTION/LOWER URINARY TRACT SYMPTOMS: Primary | ICD-10-CM

## 2021-08-04 DIAGNOSIS — N40.1 BPH WITH OBSTRUCTION/LOWER URINARY TRACT SYMPTOMS: Primary | ICD-10-CM

## 2021-08-04 DIAGNOSIS — N13.8 BPH WITH OBSTRUCTION/LOWER URINARY TRACT SYMPTOMS: ICD-10-CM

## 2021-08-04 PROCEDURE — 99213 OFFICE O/P EST LOW 20 MIN: CPT | Performed by: NURSE PRACTITIONER

## 2021-08-04 RX ORDER — TAMSULOSIN HYDROCHLORIDE 0.4 MG/1
0.4 CAPSULE ORAL
Qty: 30 CAPSULE | Refills: 3 | Status: SHIPPED | OUTPATIENT
Start: 2021-08-04 | End: 2021-08-04

## 2021-08-04 RX ORDER — TAMSULOSIN HYDROCHLORIDE 0.4 MG/1
CAPSULE ORAL
Qty: 90 CAPSULE | Refills: 3 | Status: SHIPPED | OUTPATIENT
Start: 2021-08-04 | End: 2021-12-21

## 2021-08-04 NOTE — PROGRESS NOTES
08/04/21    Tracy Barros   5/14/1932   [id number]     Assessment  1 BPH with LUTS  2 Nocturia    Discussion/Plan  1  BPH with LUTS  2 Nocturia  Referral for pelvic floor physical therapy  Trial Tamsulosin 0 4 mg once daily - we reviewed side effect profile of medication as well as mechanism of action  AUA 15   We reviewed double voiding practices  Encouraged patient to increase water intake to at least 32 oz daily and avoid bladder irritating beverages  Encouraged timed voiding as well to avoid incontinence episodes and limiting fluid consumption prior to bed  Patient will return in 8 weeks with PVR to evaluate efficacy of Tamsulosin  He will call with issues or concerns  All questions answered at this time  Subjective  HPI   Tracy Barros is an 80 y o  male here for follow up evaluation of urinary symptoms secondary to benign prostatic hyperplasia   Patient previously managed by Dr Valencia Cassette  He reports post-void dribbling, straining to initiate stream, occasional gross incontinence, and nocturia as his biggest complaint  He urinates 3-4 x per night  He was referred to pelvic floor physical therapy, however, his sessions were cut short due to the global pandemic last spring  He has previously been prescribed Tamsulosin and Finasteride which were discontinued for unknown reasons  He ultimately did not undergo a cystoscopy evaluation due to advanced age and not being a surgical candidate  Patient admits to drinking very little water  He hydrates with juice, tea, and milk  no ETOH consumption  Patient currently denies dysuria, gross hematuria and sensation of incomplete bladder emptying  He denies constipation, snoring, or sleep apnea history   He wishes to return to pelvic floor PT    PMH: CAD, GERD, dyslipidemia, anemia, incontinence, colon CA     Review of Systems - History obtained from chart review and the patient  General ROS: negative  Psychological ROS: negative  ENT ROS: negative  Hematological and Lymphatic ROS: negative  Breast ROS: negative for breast lumps  Respiratory ROS: no cough, shortness of breath, or wheezing  Cardiovascular ROS: no chest pain or dyspnea on exertion  Gastrointestinal ROS: no abdominal pain, change in bowel habits, or black or bloody stools  Genito-Urinary ROS: positive for - nocturia  Musculoskeletal ROS: negative  Neurological ROS: no TIA or stroke symptoms  Dermatological ROS: negative       Objective  Physical Exam  Vitals and nursing note reviewed  Constitutional:       General: He is awake  He is not in acute distress  Appearance: Normal appearance  He is well-developed, well-groomed and normal weight  He is not ill-appearing, toxic-appearing or diaphoretic  HENT:      Head: Normocephalic and atraumatic  Pulmonary:      Effort: Pulmonary effort is normal  No respiratory distress  Musculoskeletal:         General: Normal range of motion  Cervical back: Normal range of motion and neck supple  Skin:     General: Skin is warm and dry  Neurological:      General: No focal deficit present  Mental Status: He is alert and oriented to person, place, and time  Mental status is at baseline  Psychiatric:         Attention and Perception: Attention and perception normal          Mood and Affect: Mood and affect normal          Speech: Speech normal          Behavior: Behavior normal  Behavior is cooperative  Thought Content: Thought content normal          Cognition and Memory: Cognition normal          Judgment: Judgment normal        Component      Latest Ref Rng & Units 7/15/2019 2/12/2020   POST-VOID RESIDUAL VOLUME, ML POC      mL 74 88       AUA SYMPTOM SCORE      Most Recent Value   AUA SYMPTOM SCORE   How often have you had a sensation of not emptying your bladder completely after you finished urinating? 2   How often have you had to urinate again less than two hours after you finished urinating?   2   How often have you found you stopped and started again several times when you urinate? 1   How often have you found it difficult to postpone urination? 2   How often have you had a weak urinary stream?  2   How often have you had to push or strain to begin urination? 2   How many times did you most typically get up to urinate from the time you went to bed at night until the time you got up in the morning?   4   Quality of Life: If you were to spend the rest of your life with your urinary condition just the way it is now, how would you feel about that?  3   AUA SYMPTOM SCORE  [address], CRNP

## 2021-08-12 ENCOUNTER — TELEPHONE (OUTPATIENT)
Dept: UROLOGY | Facility: AMBULATORY SURGERY CENTER | Age: 86
End: 2021-08-12

## 2021-08-12 NOTE — TELEPHONE ENCOUNTER
Returned patients phone call to address questions and concerns   Patient stated he had a visit last week and Jesika Jarrett stated she would get him set up for pelvic floor  Patient stated that he used to go to one on that kelly set up for him , patient asked if I had that number I looked up location he needed gave him a telephone number I found for location    Patient stated he will call them

## 2021-08-12 NOTE — TELEPHONE ENCOUNTER
Patient last seen by Aubrey Gale is calling to discuss his last appointment he had  He has questions and would like to speak to nurse  Requesting a call back today

## 2021-08-25 ENCOUNTER — TELEPHONE (OUTPATIENT)
Dept: FAMILY MEDICINE CLINIC | Facility: CLINIC | Age: 86
End: 2021-08-25

## 2021-08-25 ENCOUNTER — TREATMENT (OUTPATIENT)
Dept: FAMILY MEDICINE CLINIC | Facility: CLINIC | Age: 86
End: 2021-08-25

## 2021-08-25 DIAGNOSIS — K21.9 GASTROESOPHAGEAL REFLUX DISEASE WITHOUT ESOPHAGITIS: Primary | ICD-10-CM

## 2021-08-25 DIAGNOSIS — K21.9 GASTROESOPHAGEAL REFLUX DISEASE WITHOUT ESOPHAGITIS: ICD-10-CM

## 2021-08-25 RX ORDER — PANTOPRAZOLE SODIUM 40 MG/1
TABLET, DELAYED RELEASE ORAL
Qty: 90 TABLET | Refills: 0 | Status: SHIPPED | OUTPATIENT
Start: 2021-08-25 | End: 2021-11-18 | Stop reason: SDUPTHER

## 2021-08-25 RX ORDER — PANTOPRAZOLE SODIUM 40 MG/1
40 TABLET, DELAYED RELEASE ORAL
Qty: 30 TABLET | Refills: 0 | Status: SHIPPED | OUTPATIENT
Start: 2021-08-25 | End: 2021-08-25

## 2021-08-25 NOTE — TELEPHONE ENCOUNTER
Patient called and is having bouts of heart burn and has tried OTC without relief  It starts around dinnertime and lasts throughout the night  He wants to know if there is anything else he can try

## 2021-09-05 NOTE — PROGRESS NOTES
PT Evaluation     Today's date: 2021  Patient name: Angie Kidd  : 1932  MRN: [id number]  Referring provider: JEFF Dominguez  Dx:   Encounter Diagnosis     ICD-10-CM    1  BPH with obstruction/lower urinary tract symptoms  N40 1 Ambulatory referral to Physical Therapy    N13 8    2  Urinary incontinence, unspecified type  R32 Ambulatory referral to Physical Therapy   3  Pelvic floor weakness, male  M62 89        Start Time: 3818  Stop Time: 1300  Total time in clinic (min): 55 minutes    Assessment  Assessment details: Angie Kidd is a 80 y o  male who presents with concerns of pelvic floor weakness, insensate urine leakage and post-void dribble  Patient presents with the below outlined deficits and is appropriate for skilled physical therapy in order to address deficits and ultimately meet goal of independent self management of condition  Impairments: impaired physical strength and lacks appropriate home exercise program  Understanding of Dx/Px/POC: good   Prognosis: good    Goals  STGs to be met in 3 weeks:  * Patient will be compliant with introductory HEP as prescribed  * Patient presents with good understanding of pelvic floor protective strategies to reduce intra-abdominal pressure against pelvic organs  LTGs to be met by discharge:  * Patient will report no post-void dribble following urination at all  * Normalize sEMG findings to indicate strength average > 12uV and resting average < 2 0uV  * Demonstrates correct isolation and relaxation of pelvic floor to palpation without overflow from global stabilizers  * Presents with improved nocturia to 1-2 toiletings/night for more thorough sleep  * Patient will use pelvic floor muscles correctly during functional ADLs such as coughing, sneezing, lifting and exercise activities to avoid excessive IAP and PFM strain  * Patient will be compliant with comprehensive home exercise program for self management of condition  Plan  Patient would benefit from: skilled physical therapy  Referral necessary: No  Planned therapy interventions: neuromuscular re-education, patient education, therapeutic activities, therapeutic exercise, home exercise program and strengthening  Frequency: 1x week  Duration in weeks: 12  Plan of Care beginning date: 9/7/2021  Plan of Care expiration date: 11/30/2021  Treatment plan discussed with: patient        PT Pelvic Floor Subjective:   History of Present Illness:   Patient returns after 18 months (stopped due to [place])  He reports that he has insensate urine leakage  This occurs at various times of day usually when sitting  He does not wear a bladder control pad  He notes dribble after he voids  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  She is WC bound due to knees and back  He lives in an apartment in an adult community  He cooks, cleans and does shopping  Quality of life: fair    Social Support:     Lives in:  [address] [place] [address] with:  Spouse    Relationship status: /committed  Diet and Exercise:    Diet:balanced nutrition    Volunteers one day a week at [employer] - 2-3 hours each time    Not exercising due to: lack of time  Bladder Function:     Voiding Difficulties positive for: urgency      Voiding Difficulties comments:     Voiding frequency: every 1-2 hours and every 3-4 hours    Urinary leakage aggravated by: post-void dribble and unknown    Nocturia (episodes per night): 3 and 2    Intake (ounces): Intake (ounces) comment: Juice - 8 oz  Milk on cereal  Herbal tea - 16 oz  Iced tea - 8 oz at lunch  Rare water  Occ  Wine   Incontinence Management:     Pads/Diaper Use:  None  Bowel Function:     Bowel frequency: daily    Lamar Stool Scale: type 4 and type 3  Pain:     No pain reported by patient    Diagnostic Tests:     None    Treatments:     None    Patient Goals:     Patient goals for therapy:  Improved quality of life, improved comfort and improved bladder or bowel function      Objective     Static Posture     Comments  Mild forward head and flattened lumbar lordosis    Strength/Myotome Testing     Left Hip   Normal muscle strength    Right Hip   Planes of Motion   Flexion: 4  Pelvic Floor Exam     Visual Inspection of Perineum:   Excursion of perineal body in cephalad direction with contraction of pelvic floor muscles (PFM): weak  Excursion of perineal body in caudal direction with relaxation of pelvic floor muscles (PFM): good   Involuntary contraction with coughing: yes  Involuntary relaxation with bearing down: yes    Pelvic Floor Muscle Exam     Breathing pattern with contraction: holding breath  Pelvic floor muscle relaxation is complete  PERFECT Score   Power right: 2/5 (from previous assesssment:)  Power left: 2/5 (from previous assesssment:)  Endurance (seconds to max): 4  Repetitions (before fatigue): 3  Fast flicks (in 10 seconds): 7               Precautions: standard    Access Code: [email or web address]  URL: [email or web address]  Date: 09/07/2021  Prepared by: Raquel Kumar    Exercises  Supine Hip Adduction Isometric with Tovar Bad - 1 x daily - 7 x weekly - 10 reps  Seated Pelvic Floor Contraction - 1 x daily - 7 x weekly - 10 reps      Manuals 9/7                                                                Neuro Re-Ed                                                                                                        Ther Ex             Seated isolated PFM + breath x10            Supine PFM+bretah+ adduction x10                                                                                          Ther Activity                                       Gait Training                                       Modalities

## 2021-09-07 ENCOUNTER — EVALUATION (OUTPATIENT)
Dept: PHYSICAL THERAPY | Facility: REHABILITATION | Age: 86
End: 2021-09-07
Payer: MEDICARE

## 2021-09-07 DIAGNOSIS — N40.1 BPH WITH OBSTRUCTION/LOWER URINARY TRACT SYMPTOMS: Primary | ICD-10-CM

## 2021-09-07 DIAGNOSIS — R32 URINARY INCONTINENCE, UNSPECIFIED TYPE: ICD-10-CM

## 2021-09-07 DIAGNOSIS — N13.8 BPH WITH OBSTRUCTION/LOWER URINARY TRACT SYMPTOMS: Primary | ICD-10-CM

## 2021-09-07 DIAGNOSIS — M62.89 PELVIC FLOOR WEAKNESS, MALE: ICD-10-CM

## 2021-09-07 PROCEDURE — 97110 THERAPEUTIC EXERCISES: CPT | Performed by: PHYSICAL THERAPIST

## 2021-09-07 PROCEDURE — 97162 PT EVAL MOD COMPLEX 30 MIN: CPT | Performed by: PHYSICAL THERAPIST

## 2021-09-10 ENCOUNTER — TELEPHONE (OUTPATIENT)
Dept: FAMILY MEDICINE CLINIC | Facility: CLINIC | Age: 86
End: 2021-09-10

## 2021-09-10 NOTE — TELEPHONE ENCOUNTER
Pantoprazole is not helping with heart burn ; pt is wondering can the dosages be increased or does dr have any other recommendation for pt

## 2021-09-15 ENCOUNTER — OFFICE VISIT (OUTPATIENT)
Dept: PHYSICAL THERAPY | Facility: REHABILITATION | Age: 86
End: 2021-09-15
Payer: MEDICARE

## 2021-09-15 DIAGNOSIS — N13.8 BPH WITH OBSTRUCTION/LOWER URINARY TRACT SYMPTOMS: Primary | ICD-10-CM

## 2021-09-15 DIAGNOSIS — M62.89 PELVIC FLOOR WEAKNESS, MALE: ICD-10-CM

## 2021-09-15 DIAGNOSIS — N40.1 BPH WITH OBSTRUCTION/LOWER URINARY TRACT SYMPTOMS: Primary | ICD-10-CM

## 2021-09-15 DIAGNOSIS — R32 URINARY INCONTINENCE, UNSPECIFIED TYPE: ICD-10-CM

## 2021-09-15 PROCEDURE — 97110 THERAPEUTIC EXERCISES: CPT

## 2021-09-15 NOTE — PROGRESS NOTES
Daily Note     Today's date: 9/15/2021  Patient name: Michelle Madison  : 1932  MRN: [id number]  Referring provider: JEFF Singh  Dx:   Encounter Diagnosis     ICD-10-CM    1  BPH with obstruction/lower urinary tract symptoms  N40 1     N13 8    2  Urinary incontinence, unspecified type  R32    3  Pelvic floor weakness, male  M62 89        Start Time: 4242  Stop Time: 5248  Total time in clinic (min): 40 minutes    Subjective: Pt reports being challenged with the exercises and feels he has post void dribble 50% of the time  Objective: See treatment diary below  Access Code: [email or web address]  URL: [email or web address]  Date: 09/15/2021  Prepared by: Conor Mccrary    Exercises  Sit to Stand with Pelvic Floor Contraction - 1 x daily - 7 x weekly - 3 sets - 10 reps  Seated Hip Adduction Isometrics with Ball - 1 x daily - 7 x weekly - 3 sets - 10 reps      Assessment: Tolerated treatment well  Patient would benefit from continued PT  Pt challenged to coordinate breath with activation of PFM  Benefits from tactile cueing and appears to have better form in seated position  Also discussed sitting vs standing to urinate which may decreased his post void dribble  Pt encouraged to engage his PFM when transferring and when assisting his wife  Additional time needed with each exercise to improve form and coordinate breath  Plan: Continue per plan of care  Precautions: standard    Access Code: [email or web address]  URL: [email or web address]  Date: 2021  Prepared by: Rolf Nj    Exercises  Supine Hip Adduction Isometric with Stuart Salk - 1 x daily - 7 x weekly - 10 reps  Seated Pelvic Floor Contraction - 1 x daily - 7 x weekly - 10 reps      Manuals 9/7 9/15                                                               Neuro Re-Ed                                                                                                        Ther Ex             Seated isolated PFM + breath x10 1' w/ TC & VC           Supine PFM+bretah+ adduction x10 1' w/ TC & VC           Nustep  L4 8'           SLR flexion  2x10           SLR abduction             Sit to stand  5x           Mini squats                          Ther Activity                                       Gait Training                                       Modalities

## 2021-09-21 ENCOUNTER — OFFICE VISIT (OUTPATIENT)
Dept: PHYSICAL THERAPY | Facility: REHABILITATION | Age: 86
End: 2021-09-21
Payer: MEDICARE

## 2021-09-21 DIAGNOSIS — N40.1 BPH WITH OBSTRUCTION/LOWER URINARY TRACT SYMPTOMS: Primary | ICD-10-CM

## 2021-09-21 DIAGNOSIS — M62.89 PELVIC FLOOR WEAKNESS, MALE: ICD-10-CM

## 2021-09-21 DIAGNOSIS — N13.8 BPH WITH OBSTRUCTION/LOWER URINARY TRACT SYMPTOMS: Primary | ICD-10-CM

## 2021-09-21 DIAGNOSIS — R32 URINARY INCONTINENCE, UNSPECIFIED TYPE: ICD-10-CM

## 2021-09-21 PROCEDURE — 97110 THERAPEUTIC EXERCISES: CPT

## 2021-09-21 PROCEDURE — 97530 THERAPEUTIC ACTIVITIES: CPT

## 2021-09-21 NOTE — PROGRESS NOTES
Daily Note     Today's date: 2021  Patient name: Jordin Malin  : 1932  MRN: [id number]  Referring provider: JEFF Hi  Dx:   Encounter Diagnosis     ICD-10-CM    1  BPH with obstruction/lower urinary tract symptoms  N40 1     N13 8    2  Urinary incontinence, unspecified type  R32    3  Pelvic floor weakness, male  M62 89        Start Time: 1230  Stop Time: 1315  Total time in clinic (min): 45 minutes    Subjective: Pt reports being compliant with his exercises except for the sit to stand "I can't seem to coordinate everything"  Pt feels sitting to void has decreased his post void dribble "initially a bigger difference, now not as consistent" Pt reports getting up 3x last night which seems to be the norm recently  Objective: See treatment diary below    Urge suppression handout    Assessment: Tolerated treatment well  Patient would benefit from continued PT  Pt demonstrates improved form with his exercises  Continues to be challenged with sit to stand but encouraged to only life from chair at this time to improve coordination and awareness  Plan: Continue per plan of care  Precautions: standard    Access Code: [email or web address]  URL: [email or web address]  Date: 2021  Prepared by: Amena Vieyra    Exercises  Supine Hip Adduction Isometric with Dulce Standing - 1 x daily - 7 x weekly - 10 reps  Seated Pelvic Floor Contraction - 1 x daily - 7 x weekly - 10 reps      Manuals 9/7 9/15 9/21                                                              Neuro Re-Ed                                                                                                        Ther Ex             Seated isolated PFM + breath x10 1' w/ TC & VC 2'          Supine PFM+bretah+ adduction x10 1' w/ TC & VC 2'          bridge   2x10          Nustep  L4 8' L4 10'          SLR flexion  2x10 2x10          SLR abduction             Sit to stand  5x 10x lift from chair          Mini squats Ther Activity             Urge supression    10'                       Gait Training                                       Modalities

## 2021-09-28 ENCOUNTER — OFFICE VISIT (OUTPATIENT)
Dept: PHYSICAL THERAPY | Facility: REHABILITATION | Age: 86
End: 2021-09-28
Payer: MEDICARE

## 2021-09-28 DIAGNOSIS — R32 URINARY INCONTINENCE, UNSPECIFIED TYPE: ICD-10-CM

## 2021-09-28 DIAGNOSIS — N40.1 BPH WITH OBSTRUCTION/LOWER URINARY TRACT SYMPTOMS: Primary | ICD-10-CM

## 2021-09-28 DIAGNOSIS — N13.8 BPH WITH OBSTRUCTION/LOWER URINARY TRACT SYMPTOMS: Primary | ICD-10-CM

## 2021-09-28 DIAGNOSIS — M62.89 PELVIC FLOOR WEAKNESS, MALE: ICD-10-CM

## 2021-09-28 PROCEDURE — 97110 THERAPEUTIC EXERCISES: CPT

## 2021-09-28 NOTE — PROGRESS NOTES
Daily Note     Today's date: 2021  Patient name: Laron Officer  : 1932  MRN: [id number]  Referring provider: JEFF Torres  Dx:   Encounter Diagnosis     ICD-10-CM    1  BPH with obstruction/lower urinary tract symptoms  N40 1     N13 8    2  Urinary incontinence, unspecified type  R32    3  Pelvic floor weakness, male  M62 89        Start Time: 1410  Stop Time: 1500  Total time in clinic (min): 50 minutes    Subjective: Pt reports a 20% improvement since starting therapy  Pt feels his post void dribble and overall leakage has improved  Pt continues to be challenged with episodes of nocturia  Objective: See treatment diary below  Access Code: [email or web address]  URL: [email or web address]  Date: 2021  Prepared by: Chantel Rogers    Exercises  Sit to Stand with Pelvic Floor Contraction - 1 x daily - 7 x weekly - 3 sets - 10 reps  Mini Squat with Pelvic Floor Contraction - 1 x daily - 7 x weekly - 3 sets - 10 reps  Standing Hip Abduction with Counter Support - 1 x daily - 7 x weekly - 3 sets - 10 reps  Standing Hip Extension with Counter Support - 1 x daily - 7 x weekly - 3 sets - 10 reps    Urge suppression handout    Assessment: Tolerated treatment well  Patient would benefit from continued PT  Pt demonstrates improved form with his exercises  Scheduled another follow-up in 3 weeks to allow for Ariana Cough to practice and be independent to his program Reviewed all exercises, especially sit to stand and lifting mechanics to improve coordination  Plan: Continue per plan of care  Precautions: standard    Access Code: [email or web address]  URL: [email or web address]  Date: 2021  Prepared by: Radha Stewart    Exercises  Supine Hip Adduction Isometric with Waverly Codding - 1 x daily - 7 x weekly - 10 reps  Seated Pelvic Floor Contraction - 1 x daily - 7 x weekly - 10 reps      Manuals 9/7 9/15 9/21 9/28                                                             Neuro Re-Ed                                                                                                        Ther Ex             Seated isolated PFM + breath x10 1' w/ TC & VC 2' 2'         Supine PFM+bretah+ adduction x10 1' w/ TC & VC 2' 2'         bridge   2x10 2x10         Nustep  L4 8' L4 10' L4 10'         SLR flexion  2x10 2x10 2x10 2#         SLR abduction    x10         Sit to stand  5x 10x lift from chair 10x         Mini squats    2x10         Standing hip ext & abd    2x10         Ther Activity             Urge supression    10'          Lifting mechanics    8' w/ 12#         Gait Training                                       Modalities

## 2021-09-29 ENCOUNTER — TELEMEDICINE (OUTPATIENT)
Dept: FAMILY MEDICINE CLINIC | Facility: CLINIC | Age: 86
End: 2021-09-29
Payer: MEDICARE

## 2021-09-29 DIAGNOSIS — N13.8 BPH WITH OBSTRUCTION/LOWER URINARY TRACT SYMPTOMS: ICD-10-CM

## 2021-09-29 DIAGNOSIS — K21.9 GASTROESOPHAGEAL REFLUX DISEASE WITHOUT ESOPHAGITIS: Primary | Chronic | ICD-10-CM

## 2021-09-29 DIAGNOSIS — N40.1 BPH WITH OBSTRUCTION/LOWER URINARY TRACT SYMPTOMS: ICD-10-CM

## 2021-09-29 PROCEDURE — 99213 OFFICE O/P EST LOW 20 MIN: CPT | Performed by: FAMILY MEDICINE

## 2021-09-29 NOTE — PROGRESS NOTES
Virtual Regular Visit    Verification of patient location:    Patient is located in the following state in which I hold an active license [place]      Assessment/Plan:    Problem List Items Addressed This Visit        Digestive    Gastroesophageal reflux disease without esophagitis - Primary (Chronic)       Genitourinary    BPH with obstruction/lower urinary tract symptoms               Reason for visit is   Chief Complaint   Patient presents with    Follow-up     GERD  medication update     Virtual Regular Visit        Encounter provider Sue Erickson MD    Provider located at [address] [place] [id number]      Recent Visits  No visits were found meeting these conditions  Showing recent visits within past 7 days and meeting all other requirements  Today's Visits  Date Type Provider Dept   09/29/21 Telemedicine [facility] and meeting all other requirements  Future Appointments  No visits were found meeting these conditions  Showing future appointments within next 150 days and meeting all other requirements       The patient was identified by name and date of birth  Darcy Stewart was informed that this is a telemedicine visit and that the visit is being conducted through [address] [place] [address] Now and patient was informed that this is a secure, HIPAA-compliant platform  He agrees to proceed     My office door was closed  No one else was in the room  He acknowledged consent and understanding of privacy and security of the video platform  The patient has agreed to participate and understands they can discontinue the visit at any time  Patient is aware this is a billable service  Subjective  Darcy Stewart is a 80 y o  male    Calling to follow-up the heartburn issues and his recent urologic progress         he is much improved since using pantoprazole b i d         Past Medical History:   Diagnosis Date    Anemia, iron deficiency     Drug Therapy, poor absorbtion documented    Appendicitis     Arthritis 1/25/2019    Cardiac disease     Colon cancer ([address] ) 12/2013    Resected: BREANNA    Congenital nystagmus     Left    Hyperlipidemia     Hypertension     Inguinal hernia     Sciatica 2010    resolved from problem list-2011       Past Surgical History:   Procedure Laterality Date    APPENDECTOMY      Appendicitis    CHOLECYSTECTOMY      COLON SIGMOID RESECTION  12/2013    COLON SURGERY      CORONARY ARTERY BYPASS GRAFT      x 2    CORONARY ARTERY BYPASS GRAFT      x2    CORONARY ARTERY BYPASS GRAFT      CORONARY ARTERY BYPASS GRAFT      INGUINAL HERNIA REPAIR      SHOULDER ARTHROCENTESIS Right     sub-acromial bursa area       Current Outpatient Medications   Medication Sig Dispense Refill    amLODIPine (NORVASC) 2 5 mg tablet Take 1 tablet (2 5 mg total) by mouth daily 90 tablet 3    aspirin 81 mg chewable tablet Chew 81 mg      folic acid (FOLVITE) 941 MCG tablet Take 400 mcg by mouth daily      lisinopril (ZESTRIL) 20 mg tablet Take 1 tablet (20 mg total) by mouth daily 90 tablet 3    metoprolol tartrate (LOPRESSOR) 50 mg tablet Take 1 tablet (50 mg total) by mouth every 12 (twelve) hours 180 tablet 3    Multiple Vitamins-Minerals (MULTIVITAL-M PO) Take 1 tablet by mouth      pantoprazole (PROTONIX) 40 mg tablet TAKE 1 TABLET(40 MG) BY MOUTH DAILY BEFORE BREAKFAST 90 tablet 0    simvastatin (ZOCOR) 20 mg tablet Take 1 tablet (20 mg total) by mouth daily at bedtime 90 tablet 3    tamsulosin (FLOMAX) 0 4 mg TAKE 1 CAPSULE(0 4 MG) BY MOUTH DAILY WITH DINNER 90 capsule 3    triamcinolone (KENALOG) 0 1 % cream Apply by topical route 2 times every day a thin layer to the affected area (Patient not taking: Reported on 9/29/2021) 30 g 1     No current facility-administered medications for this visit          Allergies   Allergen Reactions    Pregabalin      Other reaction(s): M/S CHANGE       Review of Systems   Constitutional: Negative for chills and fever  HENT: Negative for congestion, ear pain, rhinorrhea, sinus pain and sore throat  Eyes: Negative for visual disturbance  Respiratory: Negative for cough, shortness of breath and wheezing  Cardiovascular: Negative for chest pain  Gastrointestinal: Negative for abdominal pain, constipation and diarrhea  Skin: Negative for rash  Video Exam    There were no vitals filed for this visit  Physical Exam  Constitutional:       General: He is not in acute distress  Appearance: He is well-developed  Pulmonary:      Effort: Pulmonary effort is normal  No respiratory distress  Neurological:      Mental Status: He is alert and oriented to person, place, and time  Psychiatric:         Behavior: Behavior normal          Thought Content: Thought content normal          Judgment: Judgment normal       It was my intent to perform this visit via video technology but the patient was not able to do a video connection so the visit was completed via audio telephone only  Patient's GERD is doing much better  We will continue lifestyle modification  I told him to take a full month of double dose PPI and then reduce to once daily  Call me back if symptoms flare  VIRTUAL VISIT Alicia Nathan 80 verbally agrees to participate in [employer]  Pt is aware that [employer] could be limited without vital signs or the ability to perform a full hands-on physical [place] understands he or the provider may request at any time to terminate the video visit and request the patient to seek care or treatment in person

## 2021-10-12 ENCOUNTER — OFFICE VISIT (OUTPATIENT)
Dept: PHYSICAL THERAPY | Facility: REHABILITATION | Age: 86
End: 2021-10-12
Payer: MEDICARE

## 2021-10-12 DIAGNOSIS — N40.1 BPH WITH OBSTRUCTION/LOWER URINARY TRACT SYMPTOMS: Primary | ICD-10-CM

## 2021-10-12 DIAGNOSIS — M62.89 PELVIC FLOOR WEAKNESS, MALE: ICD-10-CM

## 2021-10-12 DIAGNOSIS — N13.8 BPH WITH OBSTRUCTION/LOWER URINARY TRACT SYMPTOMS: Primary | ICD-10-CM

## 2021-10-12 DIAGNOSIS — R32 URINARY INCONTINENCE, UNSPECIFIED TYPE: ICD-10-CM

## 2021-10-12 PROCEDURE — 97110 THERAPEUTIC EXERCISES: CPT

## 2021-10-19 ENCOUNTER — APPOINTMENT (OUTPATIENT)
Dept: PHYSICAL THERAPY | Facility: REHABILITATION | Age: 86
End: 2021-10-19
Payer: MEDICARE

## 2021-10-27 ENCOUNTER — APPOINTMENT (OUTPATIENT)
Dept: PHYSICAL THERAPY | Facility: REHABILITATION | Age: 86
End: 2021-10-27
Payer: MEDICARE

## 2021-10-28 ENCOUNTER — APPOINTMENT (OUTPATIENT)
Dept: PHYSICAL THERAPY | Facility: REHABILITATION | Age: 86
End: 2021-10-28
Payer: MEDICARE

## 2021-11-05 ENCOUNTER — OFFICE VISIT (OUTPATIENT)
Dept: URGENT CARE | Age: 86
End: 2021-11-05
Payer: MEDICARE

## 2021-11-05 ENCOUNTER — HOSPITAL ENCOUNTER (EMERGENCY)
Facility: HOSPITAL | Age: 86
Discharge: HOME | End: 2021-11-05
Attending: EMERGENCY MEDICINE
Payer: MEDICARE

## 2021-11-05 ENCOUNTER — APPOINTMENT (EMERGENCY)
Dept: RADIOLOGY | Facility: HOSPITAL | Age: 86
End: 2021-11-05
Payer: MEDICARE

## 2021-11-05 VITALS
OXYGEN SATURATION: 96 % | RESPIRATION RATE: 18 BRPM | DIASTOLIC BLOOD PRESSURE: 81 MMHG | SYSTOLIC BLOOD PRESSURE: 143 MMHG | TEMPERATURE: 97.7 F | HEART RATE: 70 BPM

## 2021-11-05 VITALS
HEIGHT: 70 IN | DIASTOLIC BLOOD PRESSURE: 85 MMHG | BODY MASS INDEX: 23.19 KG/M2 | SYSTOLIC BLOOD PRESSURE: 142 MMHG | WEIGHT: 162 LBS | HEART RATE: 74 BPM | TEMPERATURE: 97.7 F | RESPIRATION RATE: 18 BRPM | OXYGEN SATURATION: 98 %

## 2021-11-05 DIAGNOSIS — I48.91 ATRIAL FIBRILLATION, NEW ONSET (HCC): ICD-10-CM

## 2021-11-05 DIAGNOSIS — R07.9 CHEST PAIN: Primary | ICD-10-CM

## 2021-11-05 DIAGNOSIS — R07.9 CHEST PAIN, UNSPECIFIED TYPE: Primary | ICD-10-CM

## 2021-11-05 LAB
ALBUMIN SERPL BCP-MCNC: 3.9 G/DL (ref 3.5–5)
ALP SERPL-CCNC: 133 U/L (ref 46–116)
ALT SERPL W P-5'-P-CCNC: 32 U/L (ref 12–78)
ANION GAP SERPL CALCULATED.3IONS-SCNC: 8 MMOL/L (ref 4–13)
AST SERPL W P-5'-P-CCNC: 24 U/L (ref 5–45)
ATRIAL RATE: 38 BPM
ATRIAL RATE: 38 BPM
ATRIAL RATE: 62 BPM
ATRIAL RATE: 63 BPM
BASOPHILS # BLD AUTO: 0.01 THOUSANDS/ΜL (ref 0–0.1)
BASOPHILS NFR BLD AUTO: 0 % (ref 0–1)
BILIRUB SERPL-MCNC: 0.82 MG/DL (ref 0.2–1)
BUN SERPL-MCNC: 19 MG/DL (ref 5–25)
CALCIUM SERPL-MCNC: 8.5 MG/DL (ref 8.3–10.1)
CHLORIDE SERPL-SCNC: 106 MMOL/L (ref 100–108)
CO2 SERPL-SCNC: 26 MMOL/L (ref 21–32)
CREAT SERPL-MCNC: 1.05 MG/DL (ref 0.6–1.3)
EOSINOPHIL # BLD AUTO: 0.17 THOUSAND/ΜL (ref 0–0.61)
EOSINOPHIL NFR BLD AUTO: 3 % (ref 0–6)
ERYTHROCYTE [DISTWIDTH] IN BLOOD BY AUTOMATED COUNT: 12.1 % (ref 11.6–15.1)
GFR SERPL CREATININE-BSD FRML MDRD: 63 ML/MIN/1.73SQ M
GLUCOSE SERPL-MCNC: 104 MG/DL (ref 65–140)
HCT VFR BLD AUTO: 39.9 % (ref 36.5–49.3)
HGB BLD-MCNC: 13.4 G/DL (ref 12–17)
IMM GRANULOCYTES # BLD AUTO: 0.03 THOUSAND/UL (ref 0–0.2)
IMM GRANULOCYTES NFR BLD AUTO: 1 % (ref 0–2)
LYMPHOCYTES # BLD AUTO: 0.68 THOUSANDS/ΜL (ref 0.6–4.47)
LYMPHOCYTES NFR BLD AUTO: 12 % (ref 14–44)
MCH RBC QN AUTO: 33.1 PG (ref 26.8–34.3)
MCHC RBC AUTO-ENTMCNC: 33.6 G/DL (ref 31.4–37.4)
MCV RBC AUTO: 99 FL (ref 82–98)
MONOCYTES # BLD AUTO: 0.62 THOUSAND/ΜL (ref 0.17–1.22)
MONOCYTES NFR BLD AUTO: 11 % (ref 4–12)
NEUTROPHILS # BLD AUTO: 4.02 THOUSANDS/ΜL (ref 1.85–7.62)
NEUTS SEG NFR BLD AUTO: 73 % (ref 43–75)
NRBC BLD AUTO-RTO: 0 /100 WBCS
P AXIS: 65 DEGREES
P AXIS: 72 DEGREES
PLATELET # BLD AUTO: 147 THOUSANDS/UL (ref 149–390)
PMV BLD AUTO: 10.1 FL (ref 8.9–12.7)
POTASSIUM SERPL-SCNC: 3.7 MMOL/L (ref 3.5–5.3)
PR INTERVAL: 140 MS
PR INTERVAL: 152 MS
PROT SERPL-MCNC: 6.9 G/DL (ref 6.4–8.2)
QRS AXIS: 17 DEGREES
QRS AXIS: 17 DEGREES
QRS AXIS: 49 DEGREES
QRS AXIS: 55 DEGREES
QRSD INTERVAL: 88 MS
QRSD INTERVAL: 88 MS
QRSD INTERVAL: 94 MS
QRSD INTERVAL: 96 MS
QT INTERVAL: 410 MS
QT INTERVAL: 410 MS
QT INTERVAL: 412 MS
QT INTERVAL: 438 MS
QTC INTERVAL: 421 MS
QTC INTERVAL: 435 MS
QTC INTERVAL: 435 MS
QTC INTERVAL: 444 MS
RBC # BLD AUTO: 4.05 MILLION/UL (ref 3.88–5.62)
SODIUM SERPL-SCNC: 140 MMOL/L (ref 136–145)
T WAVE AXIS: 58 DEGREES
T WAVE AXIS: 58 DEGREES
T WAVE AXIS: 72 DEGREES
T WAVE AXIS: 73 DEGREES
TROPONIN I SERPL-MCNC: <0.02 NG/ML
TROPONIN I SERPL-MCNC: <0.02 NG/ML
VENTRICULAR RATE: 62 BPM
VENTRICULAR RATE: 63 BPM
VENTRICULAR RATE: 68 BPM
VENTRICULAR RATE: 68 BPM
WBC # BLD AUTO: 5.53 THOUSAND/UL (ref 4.31–10.16)

## 2021-11-05 PROCEDURE — 99285 EMERGENCY DEPT VISIT HI MDM: CPT

## 2021-11-05 PROCEDURE — 71045 X-RAY EXAM CHEST 1 VIEW: CPT

## 2021-11-05 PROCEDURE — 93005 ELECTROCARDIOGRAM TRACING: CPT | Performed by: PHYSICIAN ASSISTANT

## 2021-11-05 PROCEDURE — 93010 ELECTROCARDIOGRAM REPORT: CPT | Performed by: INTERNAL MEDICINE

## 2021-11-05 PROCEDURE — 85025 COMPLETE CBC W/AUTO DIFF WBC: CPT

## 2021-11-05 PROCEDURE — 99213 OFFICE O/P EST LOW 20 MIN: CPT | Performed by: PHYSICIAN ASSISTANT

## 2021-11-05 PROCEDURE — 84484 ASSAY OF TROPONIN QUANT: CPT | Performed by: EMERGENCY MEDICINE

## 2021-11-05 PROCEDURE — 80053 COMPREHEN METABOLIC PANEL: CPT

## 2021-11-05 PROCEDURE — 36415 COLL VENOUS BLD VENIPUNCTURE: CPT

## 2021-11-05 PROCEDURE — 93005 ELECTROCARDIOGRAM TRACING: CPT

## 2021-11-05 PROCEDURE — 99285 EMERGENCY DEPT VISIT HI MDM: CPT | Performed by: EMERGENCY MEDICINE

## 2021-11-05 PROCEDURE — G0463 HOSPITAL OUTPT CLINIC VISIT: HCPCS | Performed by: PHYSICIAN ASSISTANT

## 2021-11-05 PROCEDURE — 84484 ASSAY OF TROPONIN QUANT: CPT

## 2021-11-05 RX ORDER — ASPIRIN 81 MG/1
324 TABLET, CHEWABLE ORAL DAILY
Status: SHIPPED | OUTPATIENT
Start: 2021-11-05

## 2021-11-05 RX ADMIN — ASPIRIN 324 MG: 81 TABLET, CHEWABLE ORAL at 09:06

## 2021-11-06 ENCOUNTER — HOSPITAL ENCOUNTER (EMERGENCY)
Facility: HOSPITAL | Age: 86
Discharge: HOME | End: 2021-11-07
Attending: EMERGENCY MEDICINE
Payer: MEDICARE

## 2021-11-06 ENCOUNTER — APPOINTMENT (EMERGENCY)
Dept: CT IMAGING | Facility: HOSPITAL | Age: 86
End: 2021-11-06
Payer: MEDICARE

## 2021-11-06 VITALS
RESPIRATION RATE: 18 BRPM | TEMPERATURE: 97.5 F | HEART RATE: 72 BPM | DIASTOLIC BLOOD PRESSURE: 72 MMHG | SYSTOLIC BLOOD PRESSURE: 149 MMHG | OXYGEN SATURATION: 99 % | BODY MASS INDEX: 23 KG/M2 | WEIGHT: 160.27 LBS

## 2021-11-06 DIAGNOSIS — K52.9 COLITIS: Primary | ICD-10-CM

## 2021-11-06 LAB
ALBUMIN SERPL BCP-MCNC: 3.9 G/DL (ref 3.5–5)
ALP SERPL-CCNC: 138 U/L (ref 46–116)
ALT SERPL W P-5'-P-CCNC: 33 U/L (ref 12–78)
ANION GAP SERPL CALCULATED.3IONS-SCNC: 10 MMOL/L (ref 4–13)
AST SERPL W P-5'-P-CCNC: 26 U/L (ref 5–45)
ATRIAL RATE: 127 BPM
BASOPHILS # BLD AUTO: 0.02 THOUSANDS/ΜL (ref 0–0.1)
BASOPHILS NFR BLD AUTO: 0 % (ref 0–1)
BILIRUB SERPL-MCNC: 0.7 MG/DL (ref 0.2–1)
BUN SERPL-MCNC: 30 MG/DL (ref 5–25)
CALCIUM SERPL-MCNC: 8.4 MG/DL (ref 8.3–10.1)
CHLORIDE SERPL-SCNC: 107 MMOL/L (ref 100–108)
CO2 SERPL-SCNC: 24 MMOL/L (ref 21–32)
CREAT SERPL-MCNC: 1.47 MG/DL (ref 0.6–1.3)
EOSINOPHIL # BLD AUTO: 0.24 THOUSAND/ΜL (ref 0–0.61)
EOSINOPHIL NFR BLD AUTO: 2 % (ref 0–6)
ERYTHROCYTE [DISTWIDTH] IN BLOOD BY AUTOMATED COUNT: 12.3 % (ref 11.6–15.1)
FLUAV RNA RESP QL NAA+PROBE: NEGATIVE
FLUBV RNA RESP QL NAA+PROBE: NEGATIVE
GFR SERPL CREATININE-BSD FRML MDRD: 42 ML/MIN/1.73SQ M
GLUCOSE SERPL-MCNC: 119 MG/DL (ref 65–140)
HCT VFR BLD AUTO: 41.3 % (ref 36.5–49.3)
HGB BLD-MCNC: 13.6 G/DL (ref 12–17)
IMM GRANULOCYTES # BLD AUTO: 0.02 THOUSAND/UL (ref 0–0.2)
IMM GRANULOCYTES NFR BLD AUTO: 0 % (ref 0–2)
LIPASE SERPL-CCNC: 120 U/L (ref 73–393)
LYMPHOCYTES # BLD AUTO: 0.55 THOUSANDS/ΜL (ref 0.6–4.47)
LYMPHOCYTES NFR BLD AUTO: 5 % (ref 14–44)
MCH RBC QN AUTO: 32.7 PG (ref 26.8–34.3)
MCHC RBC AUTO-ENTMCNC: 32.9 G/DL (ref 31.4–37.4)
MCV RBC AUTO: 99 FL (ref 82–98)
MONOCYTES # BLD AUTO: 1.02 THOUSAND/ΜL (ref 0.17–1.22)
MONOCYTES NFR BLD AUTO: 10 % (ref 4–12)
NEUTROPHILS # BLD AUTO: 8.44 THOUSANDS/ΜL (ref 1.85–7.62)
NEUTS SEG NFR BLD AUTO: 83 % (ref 43–75)
NRBC BLD AUTO-RTO: 0 /100 WBCS
P AXIS: 67 DEGREES
PLATELET # BLD AUTO: 163 THOUSANDS/UL (ref 149–390)
PMV BLD AUTO: 11.1 FL (ref 8.9–12.7)
POTASSIUM SERPL-SCNC: 3.8 MMOL/L (ref 3.5–5.3)
PROT SERPL-MCNC: 6.9 G/DL (ref 6.4–8.2)
QRS AXIS: 64 DEGREES
QRSD INTERVAL: 96 MS
QT INTERVAL: 384 MS
QTC INTERVAL: 428 MS
RBC # BLD AUTO: 4.16 MILLION/UL (ref 3.88–5.62)
RSV RNA RESP QL NAA+PROBE: NEGATIVE
SARS-COV-2 RNA RESP QL NAA+PROBE: NEGATIVE
SODIUM SERPL-SCNC: 141 MMOL/L (ref 136–145)
T WAVE AXIS: 76 DEGREES
TROPONIN I SERPL-MCNC: <0.02 NG/ML
VENTRICULAR RATE: 75 BPM
WBC # BLD AUTO: 10.29 THOUSAND/UL (ref 4.31–10.16)

## 2021-11-06 PROCEDURE — 36415 COLL VENOUS BLD VENIPUNCTURE: CPT | Performed by: EMERGENCY MEDICINE

## 2021-11-06 PROCEDURE — 84484 ASSAY OF TROPONIN QUANT: CPT | Performed by: EMERGENCY MEDICINE

## 2021-11-06 PROCEDURE — 96360 HYDRATION IV INFUSION INIT: CPT

## 2021-11-06 PROCEDURE — 99285 EMERGENCY DEPT VISIT HI MDM: CPT | Performed by: EMERGENCY MEDICINE

## 2021-11-06 PROCEDURE — 85025 COMPLETE CBC W/AUTO DIFF WBC: CPT | Performed by: EMERGENCY MEDICINE

## 2021-11-06 PROCEDURE — 80053 COMPREHEN METABOLIC PANEL: CPT | Performed by: EMERGENCY MEDICINE

## 2021-11-06 PROCEDURE — 99285 EMERGENCY DEPT VISIT HI MDM: CPT

## 2021-11-06 PROCEDURE — 93010 ELECTROCARDIOGRAM REPORT: CPT | Performed by: INTERNAL MEDICINE

## 2021-11-06 PROCEDURE — 74177 CT ABD & PELVIS W/CONTRAST: CPT

## 2021-11-06 PROCEDURE — 93005 ELECTROCARDIOGRAM TRACING: CPT

## 2021-11-06 PROCEDURE — 96361 HYDRATE IV INFUSION ADD-ON: CPT

## 2021-11-06 PROCEDURE — 83690 ASSAY OF LIPASE: CPT | Performed by: EMERGENCY MEDICINE

## 2021-11-06 PROCEDURE — 0241U HB NFCT DS VIR RESP RNA 4 TRGT: CPT | Performed by: EMERGENCY MEDICINE

## 2021-11-08 ENCOUNTER — TELEPHONE (OUTPATIENT)
Dept: FAMILY MEDICINE CLINIC | Facility: CLINIC | Age: 86
End: 2021-11-08

## 2021-11-08 ENCOUNTER — TREATMENT (OUTPATIENT)
Dept: FAMILY MEDICINE CLINIC | Facility: CLINIC | Age: 86
End: 2021-11-08

## 2021-11-08 DIAGNOSIS — R19.7 DIARRHEA, UNSPECIFIED TYPE: ICD-10-CM

## 2021-11-08 DIAGNOSIS — R07.9 CHEST PAIN, UNSPECIFIED TYPE: ICD-10-CM

## 2021-11-08 DIAGNOSIS — E86.0 DEHYDRATION: ICD-10-CM

## 2021-11-08 DIAGNOSIS — D72.829 LEUKOCYTOSIS, UNSPECIFIED TYPE: Primary | ICD-10-CM

## 2021-11-08 PROCEDURE — 99214 OFFICE O/P EST MOD 30 MIN: CPT | Performed by: FAMILY MEDICINE

## 2021-11-09 ENCOUNTER — OFFICE VISIT (OUTPATIENT)
Dept: PHYSICAL THERAPY | Facility: REHABILITATION | Age: 86
End: 2021-11-09
Payer: MEDICARE

## 2021-11-09 DIAGNOSIS — M62.89 PELVIC FLOOR WEAKNESS, MALE: ICD-10-CM

## 2021-11-09 DIAGNOSIS — N13.8 BPH WITH OBSTRUCTION/LOWER URINARY TRACT SYMPTOMS: Primary | ICD-10-CM

## 2021-11-09 DIAGNOSIS — R32 URINARY INCONTINENCE, UNSPECIFIED TYPE: ICD-10-CM

## 2021-11-09 DIAGNOSIS — N40.1 BPH WITH OBSTRUCTION/LOWER URINARY TRACT SYMPTOMS: Primary | ICD-10-CM

## 2021-11-09 PROCEDURE — 97110 THERAPEUTIC EXERCISES: CPT | Performed by: PHYSICAL THERAPIST

## 2021-11-09 PROCEDURE — 97530 THERAPEUTIC ACTIVITIES: CPT | Performed by: PHYSICAL THERAPIST

## 2021-11-12 DIAGNOSIS — I10 BENIGN ESSENTIAL HTN: ICD-10-CM

## 2021-11-14 RX ORDER — LISINOPRIL 20 MG/1
20 TABLET ORAL DAILY
Qty: 90 TABLET | Refills: 3 | Status: SHIPPED | OUTPATIENT
Start: 2021-11-14

## 2021-11-18 ENCOUNTER — TELEPHONE (OUTPATIENT)
Dept: FAMILY MEDICINE CLINIC | Facility: CLINIC | Age: 86
End: 2021-11-18

## 2021-11-18 ENCOUNTER — TREATMENT (OUTPATIENT)
Dept: FAMILY MEDICINE CLINIC | Facility: CLINIC | Age: 86
End: 2021-11-18

## 2021-11-18 DIAGNOSIS — K21.9 GASTROESOPHAGEAL REFLUX DISEASE WITHOUT ESOPHAGITIS: ICD-10-CM

## 2021-11-18 RX ORDER — PANTOPRAZOLE SODIUM 40 MG/1
40 TABLET, DELAYED RELEASE ORAL DAILY
Qty: 90 TABLET | Refills: 6 | Status: SHIPPED | OUTPATIENT
Start: 2021-11-18

## 2021-11-23 ENCOUNTER — APPOINTMENT (OUTPATIENT)
Dept: PHYSICAL THERAPY | Facility: REHABILITATION | Age: 86
End: 2021-11-23
Payer: MEDICARE

## 2021-12-09 ENCOUNTER — OFFICE VISIT (OUTPATIENT)
Dept: UROLOGY | Facility: AMBULATORY SURGERY CENTER | Age: 86
End: 2021-12-09
Payer: MEDICARE

## 2021-12-09 VITALS
WEIGHT: 162.4 LBS | HEIGHT: 68 IN | HEART RATE: 60 BPM | DIASTOLIC BLOOD PRESSURE: 78 MMHG | SYSTOLIC BLOOD PRESSURE: 130 MMHG | BODY MASS INDEX: 24.61 KG/M2

## 2021-12-09 DIAGNOSIS — R33.9 URINARY RETENTION: Primary | ICD-10-CM

## 2021-12-09 LAB — POST-VOID RESIDUAL VOLUME, ML POC: 30 ML

## 2021-12-09 PROCEDURE — 51798 US URINE CAPACITY MEASURE: CPT

## 2021-12-21 ENCOUNTER — OFFICE VISIT (OUTPATIENT)
Dept: FAMILY MEDICINE CLINIC | Facility: CLINIC | Age: 86
End: 2021-12-21
Payer: MEDICARE

## 2021-12-21 VITALS
TEMPERATURE: 97.5 F | WEIGHT: 160 LBS | OXYGEN SATURATION: 98 % | HEART RATE: 64 BPM | BODY MASS INDEX: 24.33 KG/M2 | DIASTOLIC BLOOD PRESSURE: 82 MMHG | SYSTOLIC BLOOD PRESSURE: 144 MMHG | RESPIRATION RATE: 20 BRPM

## 2021-12-21 DIAGNOSIS — K21.9 GASTROESOPHAGEAL REFLUX DISEASE WITHOUT ESOPHAGITIS: Primary | Chronic | ICD-10-CM

## 2021-12-21 DIAGNOSIS — Z00.00 MEDICARE ANNUAL WELLNESS VISIT, SUBSEQUENT: Chronic | ICD-10-CM

## 2021-12-21 DIAGNOSIS — I25.10 CORONARY ARTERY DISEASE INVOLVING NATIVE CORONARY ARTERY OF NATIVE HEART WITHOUT ANGINA PECTORIS: ICD-10-CM

## 2021-12-21 DIAGNOSIS — E78.5 DYSLIPIDEMIA: ICD-10-CM

## 2021-12-21 DIAGNOSIS — N13.8 BPH WITH OBSTRUCTION/LOWER URINARY TRACT SYMPTOMS: ICD-10-CM

## 2021-12-21 DIAGNOSIS — D64.9 ANEMIA, UNSPECIFIED TYPE: ICD-10-CM

## 2021-12-21 DIAGNOSIS — I10 ESSENTIAL HYPERTENSION: ICD-10-CM

## 2021-12-21 DIAGNOSIS — N40.1 BPH WITH OBSTRUCTION/LOWER URINARY TRACT SYMPTOMS: ICD-10-CM

## 2021-12-21 PROBLEM — R19.7 DIARRHEA: Status: RESOLVED | Noted: 2021-11-08 | Resolved: 2021-12-21

## 2021-12-21 PROCEDURE — 99214 OFFICE O/P EST MOD 30 MIN: CPT | Performed by: FAMILY MEDICINE

## 2021-12-21 PROCEDURE — G0439 PPPS, SUBSEQ VISIT: HCPCS | Performed by: FAMILY MEDICINE

## 2022-01-24 PROCEDURE — U0005 INFEC AGEN DETEC AMPLI PROBE: HCPCS | Performed by: FAMILY MEDICINE

## 2022-01-24 PROCEDURE — U0003 INFECTIOUS AGENT DETECTION BY NUCLEIC ACID (DNA OR RNA); SEVERE ACUTE RESPIRATORY SYNDROME CORONAVIRUS 2 (SARS-COV-2) (CORONAVIRUS DISEASE [COVID-19]), AMPLIFIED PROBE TECHNIQUE, MAKING USE OF HIGH THROUGHPUT TECHNOLOGIES AS DESCRIBED BY CMS-2020-01-R: HCPCS | Performed by: FAMILY MEDICINE

## 2022-01-25 ENCOUNTER — LAB REQUISITION (OUTPATIENT)
Dept: LAB | Facility: HOSPITAL | Age: 87
End: 2022-01-25
Payer: MEDICARE

## 2022-01-25 DIAGNOSIS — Z03.818 ENCOUNTER FOR OBSERVATION FOR SUSPECTED EXPOSURE TO OTHER BIOLOGICAL AGENTS RULED OUT: ICD-10-CM

## 2022-01-25 DIAGNOSIS — Z11.59 ENCOUNTER FOR SCREENING FOR OTHER VIRAL DISEASES: ICD-10-CM

## 2022-01-25 LAB — SARS-COV-2 RNA RESP QL NAA+PROBE: NEGATIVE

## 2022-01-31 PROCEDURE — U0005 INFEC AGEN DETEC AMPLI PROBE: HCPCS | Performed by: FAMILY MEDICINE

## 2022-01-31 PROCEDURE — U0003 INFECTIOUS AGENT DETECTION BY NUCLEIC ACID (DNA OR RNA); SEVERE ACUTE RESPIRATORY SYNDROME CORONAVIRUS 2 (SARS-COV-2) (CORONAVIRUS DISEASE [COVID-19]), AMPLIFIED PROBE TECHNIQUE, MAKING USE OF HIGH THROUGHPUT TECHNOLOGIES AS DESCRIBED BY CMS-2020-01-R: HCPCS | Performed by: FAMILY MEDICINE

## 2022-02-01 ENCOUNTER — LAB REQUISITION (OUTPATIENT)
Dept: LAB | Facility: HOSPITAL | Age: 87
End: 2022-02-01
Payer: MEDICARE

## 2022-02-01 ENCOUNTER — APPOINTMENT (EMERGENCY)
Dept: CT IMAGING | Facility: HOSPITAL | Age: 87
End: 2022-02-01
Payer: MEDICARE

## 2022-02-01 ENCOUNTER — HOSPITAL ENCOUNTER (EMERGENCY)
Facility: HOSPITAL | Age: 87
Discharge: HOME | End: 2022-02-01
Attending: EMERGENCY MEDICINE | Admitting: EMERGENCY MEDICINE
Payer: MEDICARE

## 2022-02-01 VITALS
SYSTOLIC BLOOD PRESSURE: 133 MMHG | TEMPERATURE: 97.6 F | HEART RATE: 96 BPM | OXYGEN SATURATION: 95 % | DIASTOLIC BLOOD PRESSURE: 96 MMHG | RESPIRATION RATE: 18 BRPM

## 2022-02-01 DIAGNOSIS — Z03.818 ENCOUNTER FOR OBSERVATION FOR SUSPECTED EXPOSURE TO OTHER BIOLOGICAL AGENTS RULED OUT: ICD-10-CM

## 2022-02-01 DIAGNOSIS — R19.7 NAUSEA VOMITING AND DIARRHEA: Primary | ICD-10-CM

## 2022-02-01 DIAGNOSIS — Z11.59 ENCOUNTER FOR SCREENING FOR OTHER VIRAL DISEASES: ICD-10-CM

## 2022-02-01 DIAGNOSIS — R11.2 NAUSEA VOMITING AND DIARRHEA: Primary | ICD-10-CM

## 2022-02-01 LAB
ALBUMIN SERPL BCP-MCNC: 4 G/DL (ref 3.5–5)
ALP SERPL-CCNC: 132 U/L (ref 46–116)
ALT SERPL W P-5'-P-CCNC: 44 U/L (ref 12–78)
ANION GAP SERPL CALCULATED.3IONS-SCNC: 12 MMOL/L (ref 4–13)
AST SERPL W P-5'-P-CCNC: 33 U/L (ref 5–45)
BILIRUB SERPL-MCNC: 1.16 MG/DL (ref 0.2–1)
BUN SERPL-MCNC: 28 MG/DL (ref 5–25)
CALCIUM SERPL-MCNC: 7.9 MG/DL (ref 8.3–10.1)
CHLORIDE SERPL-SCNC: 104 MMOL/L (ref 100–108)
CO2 SERPL-SCNC: 23 MMOL/L (ref 21–32)
CREAT SERPL-MCNC: 1.25 MG/DL (ref 0.6–1.3)
ERYTHROCYTE [DISTWIDTH] IN BLOOD BY AUTOMATED COUNT: 12.7 % (ref 11.6–15.1)
GFR SERPL CREATININE-BSD FRML MDRD: 50 ML/MIN/1.73SQ M
GLUCOSE SERPL-MCNC: 117 MG/DL (ref 65–140)
HCT VFR BLD AUTO: 45.7 % (ref 36.5–49.3)
HGB BLD-MCNC: 15.1 G/DL (ref 12–17)
LIPASE SERPL-CCNC: 117 U/L (ref 73–393)
MCH RBC QN AUTO: 33 PG (ref 26.8–34.3)
MCHC RBC AUTO-ENTMCNC: 33 G/DL (ref 31.4–37.4)
MCV RBC AUTO: 100 FL (ref 82–98)
PLATELET # BLD AUTO: 170 THOUSANDS/UL (ref 149–390)
PMV BLD AUTO: 10.5 FL (ref 8.9–12.7)
POTASSIUM SERPL-SCNC: 3.9 MMOL/L (ref 3.5–5.3)
PROT SERPL-MCNC: 7.2 G/DL (ref 6.4–8.2)
RBC # BLD AUTO: 4.57 MILLION/UL (ref 3.88–5.62)
SARS-COV-2 RNA RESP QL NAA+PROBE: NEGATIVE
SODIUM SERPL-SCNC: 139 MMOL/L (ref 136–145)
WBC # BLD AUTO: 6.81 THOUSAND/UL (ref 4.31–10.16)

## 2022-02-01 PROCEDURE — 36415 COLL VENOUS BLD VENIPUNCTURE: CPT | Performed by: EMERGENCY MEDICINE

## 2022-02-01 PROCEDURE — 80053 COMPREHEN METABOLIC PANEL: CPT | Performed by: EMERGENCY MEDICINE

## 2022-02-01 PROCEDURE — 99284 EMERGENCY DEPT VISIT MOD MDM: CPT

## 2022-02-01 PROCEDURE — 96374 THER/PROPH/DIAG INJ IV PUSH: CPT

## 2022-02-01 PROCEDURE — 74177 CT ABD & PELVIS W/CONTRAST: CPT

## 2022-02-01 PROCEDURE — 99284 EMERGENCY DEPT VISIT MOD MDM: CPT | Performed by: EMERGENCY MEDICINE

## 2022-02-01 PROCEDURE — 83690 ASSAY OF LIPASE: CPT | Performed by: EMERGENCY MEDICINE

## 2022-02-01 PROCEDURE — NC001 PR NO CHARGE: Performed by: PHYSICIAN ASSISTANT

## 2022-02-01 PROCEDURE — 96361 HYDRATE IV INFUSION ADD-ON: CPT

## 2022-02-01 PROCEDURE — 85025 COMPLETE CBC W/AUTO DIFF WBC: CPT | Performed by: EMERGENCY MEDICINE

## 2022-02-01 RX ORDER — DICYCLOMINE HCL 20 MG
20 TABLET ORAL 2 TIMES DAILY
Qty: 20 TABLET | Refills: 0 | Status: SHIPPED | OUTPATIENT
Start: 2022-02-01 | End: 2022-05-03

## 2022-02-01 RX ORDER — ONDANSETRON 2 MG/ML
4 INJECTION INTRAMUSCULAR; INTRAVENOUS ONCE
Status: COMPLETED | OUTPATIENT
Start: 2022-02-01 | End: 2022-02-01

## 2022-02-01 RX ORDER — ONDANSETRON 4 MG/1
4 TABLET, ORALLY DISINTEGRATING ORAL EVERY 8 HOURS PRN
Qty: 20 TABLET | Refills: 0 | Status: SHIPPED | OUTPATIENT
Start: 2022-02-01 | End: 2022-05-03

## 2022-02-01 RX ADMIN — ONDANSETRON 4 MG: 2 INJECTION INTRAMUSCULAR; INTRAVENOUS at 05:40

## 2022-02-01 RX ADMIN — SODIUM CHLORIDE 1000 ML: 0.9 INJECTION, SOLUTION INTRAVENOUS at 05:44

## 2022-02-01 RX ADMIN — IOHEXOL 100 ML: 350 INJECTION, SOLUTION INTRAVENOUS at 06:47

## 2022-02-01 NOTE — ED NOTES
Called [facility] to arranged transport for patient to return to [facility]  ETA pending at this time  Josh Ross RN  02/01/22 5868    Claudio Zarate at [facility] to update that patient is being discharged and will be picked up by [facility] at 21 [phone] for transport  Luzmaria verbalized understanding       Josh Ross RN  02/01/22 3343

## 2022-02-01 NOTE — ED PROVIDER NOTES
Emergency Department Sign Out Note        Sign out and transfer of care from [employer]  See Separate Emergency Department note  The patient, Merced King, was evaluated by the previous provider for abdominal pain, N/V/D      Workup Completed:  Results Reviewed     Procedure Component Value Units Date/Time    Comprehensive metabolic panel [[id number]]  (Abnormal) Collected: 02/01/22 0540    Lab Status: Final result Specimen: Blood from Arm, Right Updated: 02/01/22 0604     Sodium 139 mmol/L      Potassium 3 9 mmol/L      Chloride 104 mmol/L      CO2 23 mmol/L      ANION GAP 12 mmol/L      BUN 28 mg/dL      Creatinine 1 25 mg/dL      Glucose 117 mg/dL      Calcium 7 9 mg/dL      AST 33 U/L      ALT 44 U/L      Alkaline Phosphatase 132 U/L      Total Protein 7 2 g/dL      Albumin 4 0 g/dL      Total Bilirubin 1 16 mg/dL      eGFR 50 ml/min/1 73sq m     Narrative:      [place] guidelines for Chronic Kidney Disease (CKD):     Stage 1 with normal or high GFR (GFR > 90 mL/min/1 73 square meters)    Stage 2 Mild CKD (GFR = 60-89 mL/min/1 73 square meters)    Stage 3A Moderate CKD (GFR = 45-59 mL/min/1 73 square meters)    Stage 3B Moderate CKD (GFR = 30-44 mL/min/1 73 square meters)    Stage 4 Severe CKD (GFR = 15-29 mL/min/1 73 square meters)    Stage 5 End Stage CKD (GFR <15 mL/min/1 73 square meters)  Note: GFR calculation is accurate only with a steady state creatinine    Lipase [[id number]]  (Normal) Collected: 02/01/22 0540    Lab Status: Final result Specimen: Blood from Arm, Right Updated: 02/01/22 0604     Lipase 117 u/L     CBC and differential [[id number]]  (Abnormal) Collected: 02/01/22 0540    Lab Status: Final result Specimen: Blood from Arm, Right Updated: 02/01/22 0551     WBC 6 81 Thousand/uL      RBC 4 57 Million/uL      Hemoglobin 15 1 g/dL      Hematocrit 45 7 %       fL      MCH 33 0 pg      MCHC 33 0 g/dL      RDW 12 7 %      MPV 10 5 fL      Platelets 170 Thousands/uL     Narrative: This is an appended report  These results have been appended to a previously verified report  CT abdomen pelvis with contrast   Final Result      No acute pathology  Liquid stool throughout the colon consistent with history of diarrhea  Marked prostatomegaly  Workstation performed: [id number]               ED Course / Workup Pending (followup):  Pending CT a/p  Re-evaluation, PO challenge                                   ED Course as of 02/01/22 1032   Tue Feb 01, 2022   0605 Sign out from [facility], patient with n/v/d x2 days, received zofran and IVF  CT a/p pending and re-evaluate    0723 Patient feeling improved, will PO challenge    0836 Patient tolerated PO  CT results discussed with patient  Rx for zofran and bentyl provided  Recommend BRAT diet and increase fluid intake  F/u with PCP     Procedures  MDM        Disposition  Final diagnoses:   Nausea vomiting and diarrhea     Time reflects when diagnosis was documented in both MDM as applicable and the Disposition within this note     Time User Action Codes Description Comment    2/1/2022  6:07 AM Nikolai Meals Add [R11 2,  R19 7] Nausea vomiting and diarrhea       ED Disposition     ED Disposition Condition Date/Time Comment    Discharge Stable Tue Feb 1, 2022  8:36 AM Lalitha Smiley discharge to home/self care              Follow-up Information     Follow up With Specialties Details Why Contact Info    Zenobia Mohan MD Family Medicine Schedule an appointment as soon as possible for a visit   Eddie Celestin [address] [place] [phone]  [phone]          Patient's Medications   Discharge Prescriptions    DICYCLOMINE (BENTYL) 20 MG TABLET    Take 1 tablet (20 mg total) by mouth 2 (two) times a day       Start Date: 2/1/2022  End Date: --       Order Dose: 20 mg       Quantity: 20 tablet    Refills: 0    ONDANSETRON (ZOFRAN-ODT) 4 MG DISINTEGRATING TABLET    Take 1 tablet (4 mg total) by mouth every 8 (eight) hours as needed for nausea       Start Date: 2/1/2022  End Date: --       Order Dose: 4 mg       Quantity: 20 tablet    Refills: 0     No discharge procedures on file         ED Provider  Electronically Signed by     Yana Duque PA-C  02/01/22 1452

## 2022-02-01 NOTE — ED PROVIDER NOTES
History  Chief Complaint   Patient presents with    Diarrhea     Patient arrives to ED via EMS  Patient reporting diarrhea since sunday with one episode of vomiting this AM  Denies abdominal pain, chest pain  Alert and oriented x 4     Vomiting     Pt is an 80year old male with a PMH of CABG x 2, appendectomy, cholecystectomy presenting with n/v/d x 2 days  Pt states he began with 4 episodes of diarrhea 2 days ago  States it is brown watery stool  He took imodium and felt better  States yesterday he began to have nausea and has been dry heaving but not vomit is actually coming up because he has not been eating  Pt actively dry heaving in the room  States his stomach is upset but denies pain  Associated chills  Denies chest pain, SOB, back pain, urinary symptoms  No sick contacts  Tested by nursing [place] for [facility] yesterday, unsure of results  History provided by:  Patient   used: No    Diarrhea  Quality:  Watery  Severity:  Moderate  Onset quality:  Gradual  Number of episodes:  4 daily  Duration:  2 days  Timing:  Intermittent  Progression:  Unchanged  Relieved by:  Nothing  Worsened by:  Nothing  Ineffective treatments:  Anti-motility medications and liquids  Associated symptoms: chills and vomiting    Associated symptoms: no abdominal pain, no diaphoresis and no fever    Risk factors: no recent antibiotic use, no sick contacts, no suspicious food intake and no travel to endemic areas    Vomiting  Associated symptoms: chills and diarrhea    Associated symptoms: no abdominal pain and no fever        Prior to Admission Medications   Prescriptions Last Dose Informant Patient Reported? Taking?    Multiple Vitamins-Minerals (MULTIVITAL-M PO)  Self Yes Yes   Sig: Take 1 tablet by mouth   amLODIPine (NORVASC) 2 5 mg tablet  Self No Yes   Sig: Take 1 tablet (2 5 mg total) by mouth daily   aspirin 81 mg chewable tablet  Self Yes Yes   Sig: Chew 81 mg   folic acid (FOLVITE) 253 MCG tablet  Self Yes Yes   Sig: Take 400 mcg by mouth daily   lisinopril (ZESTRIL) 20 mg tablet   No Yes   Sig: Take 1 tablet (20 mg total) by mouth daily   metoprolol tartrate (LOPRESSOR) 50 mg tablet   No Yes   Sig: Take 1 tablet (50 mg total) by mouth every 12 (twelve) hours   pantoprazole (PROTONIX) 40 mg tablet   No Yes   Sig: Take 1 tablet (40 mg total) by mouth daily   simvastatin (ZOCOR) 20 mg tablet  Self No Yes   Sig: Take 1 tablet (20 mg total) by mouth daily at bedtime      Facility-Administered Medications Last Administration Doses Remaining   aspirin chewable tablet 324 mg 11/5/2021  9:06 AM           Past Medical History:   Diagnosis Date    Anemia, iron deficiency     Drug Therapy, poor absorbtion documented    Appendicitis     Arthritis 1/25/2019    Cardiac disease     Colon cancer ([facility] [place] [address] ) 12/2013    Resected: BREANNA    Congenital nystagmus     Left    Hyperlipidemia     Hypertension     Inguinal hernia     Sciatica 2010    resolved from problem list-2011       Past Surgical History:   Procedure Laterality Date    APPENDECTOMY      Appendicitis    CHOLECYSTECTOMY      COLON SIGMOID RESECTION  12/2013    COLON SURGERY      CORONARY ARTERY BYPASS GRAFT      x 2    CORONARY ARTERY BYPASS GRAFT      x2    CORONARY ARTERY BYPASS GRAFT      CORONARY ARTERY BYPASS GRAFT      INGUINAL HERNIA REPAIR      SHOULDER ARTHROCENTESIS Right     sub-acromial bursa area       Family History   Problem Relation Age of Onset    Hypertension Father     Heart disease Father     Heart disease Mother     Cancer Sister     Cancer Brother      I have reviewed and agree with the history as documented      E-Cigarette/Vaping    E-Cigarette Use Never User      E-Cigarette/Vaping Substances    Nicotine No     THC No     CBD No     Flavoring No     Other No     Unknown No      Social History     Tobacco Use    Smoking status: Never Smoker    Smokeless tobacco: Never Used   Vaping Use    Vaping Use: Never used Substance Use Topics    Alcohol use: No    Drug use: No       Review of Systems   Constitutional: Positive for chills  Negative for diaphoresis, fatigue and fever  HENT: Negative  Respiratory: Negative  Cardiovascular: Negative  Gastrointestinal: Positive for diarrhea, nausea and vomiting  Negative for abdominal distention, abdominal pain and constipation  Genitourinary: Negative  Musculoskeletal: Negative  Neurological: Negative  All other systems reviewed and are negative  Physical Exam  Physical Exam  Constitutional:       General: He is not in acute distress  Appearance: He is well-developed  He is ill-appearing  He is not toxic-appearing or diaphoretic  HENT:      Head: Normocephalic and atraumatic  Right Ear: External ear normal       Left Ear: External ear normal       Nose: Nose normal       Mouth/Throat:      Mouth: Mucous membranes are dry  Pharynx: No oropharyngeal exudate or posterior oropharyngeal erythema  Eyes:      General: No scleral icterus  Right eye: No discharge  Left eye: No discharge  Extraocular Movements: Extraocular movements intact  Conjunctiva/sclera: Conjunctivae normal    Cardiovascular:      Rate and Rhythm: Normal rate and regular rhythm  Heart sounds: Normal heart sounds  Pulmonary:      Effort: Pulmonary effort is normal       Breath sounds: Normal breath sounds  Abdominal:      General: Abdomen is flat  Bowel sounds are normal  There is no distension  Palpations: Abdomen is soft  Tenderness: There is no abdominal tenderness  Musculoskeletal:         General: Normal range of motion  Cervical back: Normal range of motion and neck supple  Skin:     General: Skin is warm and dry  Neurological:      General: No focal deficit present  Mental Status: He is alert and oriented to person, place, and time  Mental status is at baseline     Psychiatric:         Mood and Affect: Mood normal          Behavior: Behavior normal          Vital Signs  ED Triage Vitals   Temperature Pulse Respirations Blood Pressure SpO2   02/01/22 0518 02/01/22 0518 02/01/22 0518 02/01/22 0518 02/01/22 0518   97 6 °F (36 4 °C) 94 18 139/74 97 %      Temp Source Heart Rate Source Patient Position - Orthostatic VS BP Location FiO2 (%)   02/01/22 0518 -- 02/01/22 0800 02/01/22 0800 --   Oral  Lying Left arm       Pain Score       --                  Vitals:    02/01/22 0518 02/01/22 0800   BP: 139/74 133/96   Pulse: 94 96   Patient Position - Orthostatic VS:  Lying         Visual Acuity      ED Medications  Medications   sodium chloride 0 9 % bolus 1,000 mL (0 mL Intravenous Stopped 2/1/22 0835)   ondansetron (ZOFRAN) injection 4 mg (4 mg Intravenous Given 2/1/22 0540)   iohexol (OMNIPAQUE) 350 MG/ML injection (SINGLE-DOSE) 100 mL (100 mL Intravenous Given 2/1/22 0647)       Diagnostic Studies  Results Reviewed     Procedure Component Value Units Date/Time    Comprehensive metabolic panel [[id number]]  (Abnormal) Collected: 02/01/22 0540    Lab Status: Final result Specimen: Blood from Arm, Right Updated: 02/01/22 0604     Sodium 139 mmol/L      Potassium 3 9 mmol/L      Chloride 104 mmol/L      CO2 23 mmol/L      ANION GAP 12 mmol/L      BUN 28 mg/dL      Creatinine 1 25 mg/dL      Glucose 117 mg/dL      Calcium 7 9 mg/dL      AST 33 U/L      ALT 44 U/L      Alkaline Phosphatase 132 U/L      Total Protein 7 2 g/dL      Albumin 4 0 g/dL      Total Bilirubin 1 16 mg/dL      eGFR 50 ml/min/1 73sq m     Narrative:      [place] guidelines for Chronic Kidney Disease (CKD):     Stage 1 with normal or high GFR (GFR > 90 mL/min/1 73 square meters)    Stage 2 Mild CKD (GFR = 60-89 mL/min/1 73 square meters)    Stage 3A Moderate CKD (GFR = 45-59 mL/min/1 73 square meters)    Stage 3B Moderate CKD (GFR = 30-44 mL/min/1 73 square meters)    Stage 4 Severe CKD (GFR = 15-29 mL/min/1 73 square meters)   Stage 5 End Stage CKD (GFR <15 mL/min/1 73 square meters)  Note: GFR calculation is accurate only with a steady state creatinine    Lipase [[id number]]  (Normal) Collected: 02/01/22 0540    Lab Status: Final result Specimen: Blood from Arm, Right Updated: 02/01/22 0604     Lipase 117 u/L     CBC and differential [[id number]]  (Abnormal) Collected: 02/01/22 0540    Lab Status: Final result Specimen: Blood from Arm, Right Updated: 02/01/22 0551     WBC 6 81 Thousand/uL      RBC 4 57 Million/uL      Hemoglobin 15 1 g/dL      Hematocrit 45 7 %       fL      MCH 33 0 pg      MCHC 33 0 g/dL      RDW 12 7 %      MPV 10 5 fL      Platelets 079 Thousands/uL     Narrative: This is an appended report  These results have been appended to a previously verified report  CT abdomen pelvis with contrast   Final Result by Eugene Drake MD (02/01 4120)      No acute pathology  Liquid stool throughout the colon consistent with history of diarrhea  Marked prostatomegaly  Workstation performed: [id number]                    Procedures  Procedures         ED Course  ED Course as of 02/01/22 1922 Tue Feb 01, 2022   0559 Pt presenting with n/v/d x 2 days  Denies abdominal pain and has no abdominal tenderness on exam  Will check labs and CT  Giving Zofran and fluids for symptoms  Tested already by nursing home for [facility]  He is triple vaccinated     0606 Signing out to FANI Kelsey                                SBIRT 20yo+      Most Recent Value   SBIRT (25 yo +)    In order to provide better care to our patients, we are screening all of our patients for alcohol and drug use  Would it be okay to ask you these screening questions?  No Filed at: 02/01/2022 1036                    MDM  Number of Diagnoses or Management Options  Nausea vomiting and diarrhea: new and requires workup     Amount and/or Complexity of Data Reviewed  Clinical lab tests: ordered and reviewed  Tests in the radiology section of CPT®: reviewed and ordered  Discuss the patient with other providers: yes  Independent visualization of images, tracings, or specimens: yes    Risk of Complications, Morbidity, and/or Mortality  Presenting problems: high  Management options: high    Patient Progress  Patient progress: stable      Disposition  Final diagnoses:   Nausea vomiting and diarrhea     Time reflects when diagnosis was documented in both MDM as applicable and the Disposition within this note     Time User Action Codes Description Comment    2/1/2022  6:07 AM Lonkieshal Cords Add [R11 2,  R19 7] Nausea vomiting and diarrhea       ED Disposition     ED Disposition Condition Date/Time Comment    Discharge Stable Tue Feb 1, 2022  8:36 AM [place] discharge to home/self care              Follow-up Information     Follow up With Specialties Details Why Contact Info    Xander Gunderson MD Family Medicine Schedule an appointment as soon as possible for a visit   Eddie Celestin  [address]  [phone]            Discharge Medication List as of 2/1/2022  8:36 AM      START taking these medications    Details   dicyclomine (BENTYL) 20 mg tablet Take 1 tablet (20 mg total) by mouth 2 (two) times a day, Starting Tue 2/1/2022, Normal      ondansetron (ZOFRAN-ODT) 4 mg disintegrating tablet Take 1 tablet (4 mg total) by mouth every 8 (eight) hours as needed for nausea, Starting Tue 2/1/2022, Normal         CONTINUE these medications which have NOT CHANGED    Details   amLODIPine (NORVASC) 2 5 mg tablet Take 1 tablet (2 5 mg total) by mouth daily, Starting Mon 3/8/2021, Normal      aspirin 81 mg chewable tablet Chew 81 mg, Historical Med      folic acid (FOLVITE) 626 MCG tablet Take 400 mcg by mouth daily, Historical Med      lisinopril (ZESTRIL) 20 mg tablet Take 1 tablet (20 mg total) by mouth daily, Starting Sun 11/14/2021, Normal      metoprolol tartrate (LOPRESSOR) 50 mg tablet Take 1 tablet (50 mg total) by mouth every 12 (twelve) hours, Starting Wed 6/23/2021, Normal      Multiple Vitamins-Minerals (MULTIVITAL-M PO) Take 1 tablet by mouth, Historical Med      pantoprazole (PROTONIX) 40 mg tablet Take 1 tablet (40 mg total) by mouth daily, Starting Thu 11/18/2021, Normal      simvastatin (ZOCOR) 20 mg tablet Take 1 tablet (20 mg total) by mouth daily at bedtime, Starting Mon 1/25/2021, Normal             No discharge procedures on file      PDMP Review     None          ED Provider  Electronically Signed by           Faby Garcia PA-C  02/01/22 1922

## 2022-02-03 DIAGNOSIS — E78.5 HYPERLIPIDEMIA, UNSPECIFIED HYPERLIPIDEMIA TYPE: ICD-10-CM

## 2022-02-03 RX ORDER — SIMVASTATIN 20 MG
20 TABLET ORAL
Qty: 90 TABLET | Refills: 3 | Status: SHIPPED | OUTPATIENT
Start: 2022-02-03

## 2022-02-07 PROCEDURE — U0003 INFECTIOUS AGENT DETECTION BY NUCLEIC ACID (DNA OR RNA); SEVERE ACUTE RESPIRATORY SYNDROME CORONAVIRUS 2 (SARS-COV-2) (CORONAVIRUS DISEASE [COVID-19]), AMPLIFIED PROBE TECHNIQUE, MAKING USE OF HIGH THROUGHPUT TECHNOLOGIES AS DESCRIBED BY CMS-2020-01-R: HCPCS | Performed by: FAMILY MEDICINE

## 2022-02-07 PROCEDURE — U0005 INFEC AGEN DETEC AMPLI PROBE: HCPCS | Performed by: FAMILY MEDICINE

## 2022-02-08 ENCOUNTER — LAB REQUISITION (OUTPATIENT)
Dept: LAB | Facility: HOSPITAL | Age: 87
End: 2022-02-08
Payer: MEDICARE

## 2022-02-08 DIAGNOSIS — Z11.59 ENCOUNTER FOR SCREENING FOR OTHER VIRAL DISEASES: ICD-10-CM

## 2022-02-08 DIAGNOSIS — Z03.818 ENCOUNTER FOR OBSERVATION FOR SUSPECTED EXPOSURE TO OTHER BIOLOGICAL AGENTS RULED OUT: ICD-10-CM

## 2022-02-08 LAB — SARS-COV-2 RNA RESP QL NAA+PROBE: NEGATIVE

## 2022-02-14 PROCEDURE — U0005 INFEC AGEN DETEC AMPLI PROBE: HCPCS | Performed by: FAMILY MEDICINE

## 2022-02-14 PROCEDURE — U0003 INFECTIOUS AGENT DETECTION BY NUCLEIC ACID (DNA OR RNA); SEVERE ACUTE RESPIRATORY SYNDROME CORONAVIRUS 2 (SARS-COV-2) (CORONAVIRUS DISEASE [COVID-19]), AMPLIFIED PROBE TECHNIQUE, MAKING USE OF HIGH THROUGHPUT TECHNOLOGIES AS DESCRIBED BY CMS-2020-01-R: HCPCS | Performed by: FAMILY MEDICINE

## 2022-02-15 ENCOUNTER — LAB REQUISITION (OUTPATIENT)
Dept: LAB | Facility: HOSPITAL | Age: 87
End: 2022-02-15
Payer: MEDICARE

## 2022-02-15 DIAGNOSIS — Z11.59 ENCOUNTER FOR SCREENING FOR OTHER VIRAL DISEASES: ICD-10-CM

## 2022-02-15 DIAGNOSIS — Z03.818 ENCOUNTER FOR OBSERVATION FOR SUSPECTED EXPOSURE TO OTHER BIOLOGICAL AGENTS RULED OUT: ICD-10-CM

## 2022-02-15 LAB — SARS-COV-2 RNA RESP QL NAA+PROBE: NEGATIVE

## 2022-02-28 PROCEDURE — U0003 INFECTIOUS AGENT DETECTION BY NUCLEIC ACID (DNA OR RNA); SEVERE ACUTE RESPIRATORY SYNDROME CORONAVIRUS 2 (SARS-COV-2) (CORONAVIRUS DISEASE [COVID-19]), AMPLIFIED PROBE TECHNIQUE, MAKING USE OF HIGH THROUGHPUT TECHNOLOGIES AS DESCRIBED BY CMS-2020-01-R: HCPCS | Performed by: FAMILY MEDICINE

## 2022-02-28 PROCEDURE — U0005 INFEC AGEN DETEC AMPLI PROBE: HCPCS | Performed by: FAMILY MEDICINE

## 2022-03-01 ENCOUNTER — LAB REQUISITION (OUTPATIENT)
Dept: LAB | Facility: HOSPITAL | Age: 87
End: 2022-03-01
Payer: MEDICARE

## 2022-03-01 DIAGNOSIS — Z11.59 ENCOUNTER FOR SCREENING FOR OTHER VIRAL DISEASES: ICD-10-CM

## 2022-03-01 DIAGNOSIS — Z03.818 ENCOUNTER FOR OBSERVATION FOR SUSPECTED EXPOSURE TO OTHER BIOLOGICAL AGENTS RULED OUT: ICD-10-CM

## 2022-03-01 LAB — SARS-COV-2 RNA RESP QL NAA+PROBE: NEGATIVE

## 2022-03-07 ENCOUNTER — LAB REQUISITION (OUTPATIENT)
Dept: LAB | Facility: HOSPITAL | Age: 87
End: 2022-03-07
Payer: MEDICARE

## 2022-03-07 DIAGNOSIS — Z03.818 ENCOUNTER FOR OBSERVATION FOR SUSPECTED EXPOSURE TO OTHER BIOLOGICAL AGENTS RULED OUT: ICD-10-CM

## 2022-03-07 DIAGNOSIS — Z11.59 ENCOUNTER FOR SCREENING FOR OTHER VIRAL DISEASES: ICD-10-CM

## 2022-03-07 LAB — SARS-COV-2 RNA RESP QL NAA+PROBE: POSITIVE

## 2022-03-07 PROCEDURE — U0005 INFEC AGEN DETEC AMPLI PROBE: HCPCS | Performed by: FAMILY MEDICINE

## 2022-03-07 PROCEDURE — U0003 INFECTIOUS AGENT DETECTION BY NUCLEIC ACID (DNA OR RNA); SEVERE ACUTE RESPIRATORY SYNDROME CORONAVIRUS 2 (SARS-COV-2) (CORONAVIRUS DISEASE [COVID-19]), AMPLIFIED PROBE TECHNIQUE, MAKING USE OF HIGH THROUGHPUT TECHNOLOGIES AS DESCRIBED BY CMS-2020-01-R: HCPCS | Performed by: FAMILY MEDICINE

## 2022-03-08 PROBLEM — U07.1 COVID-19: Status: ACTIVE | Noted: 2022-03-08

## 2022-03-09 ENCOUNTER — TELEPHONE (OUTPATIENT)
Dept: FAMILY MEDICINE CLINIC | Facility: CLINIC | Age: 87
End: 2022-03-09

## 2022-03-09 NOTE — TELEPHONE ENCOUNTER
Spoke with patient, stated symptoms are not getting worse  Told patient to call if any cough or shortness of breath

## 2022-03-31 DIAGNOSIS — I10 BENIGN ESSENTIAL HTN: ICD-10-CM

## 2022-03-31 RX ORDER — AMLODIPINE BESYLATE 2.5 MG/1
2.5 TABLET ORAL DAILY
Qty: 90 TABLET | Refills: 0 | Status: SHIPPED | OUTPATIENT
Start: 2022-03-31 | End: 2022-05-03

## 2022-04-23 ENCOUNTER — APPOINTMENT (EMERGENCY)
Dept: CT IMAGING | Facility: HOSPITAL | Age: 87
End: 2022-04-23
Payer: MEDICARE

## 2022-04-23 ENCOUNTER — HOSPITAL ENCOUNTER (EMERGENCY)
Facility: HOSPITAL | Age: 87
Discharge: HOME | End: 2022-04-23
Admitting: EMERGENCY MEDICINE
Payer: MEDICARE

## 2022-04-23 ENCOUNTER — TELEPHONE (OUTPATIENT)
Dept: OTHER | Facility: HOSPITAL | Age: 87
End: 2022-04-23

## 2022-04-23 VITALS
DIASTOLIC BLOOD PRESSURE: 87 MMHG | HEART RATE: 60 BPM | RESPIRATION RATE: 18 BRPM | OXYGEN SATURATION: 100 % | TEMPERATURE: 97.5 F | SYSTOLIC BLOOD PRESSURE: 178 MMHG

## 2022-04-23 DIAGNOSIS — R33.8 ACUTE URINARY RETENTION: Primary | ICD-10-CM

## 2022-04-23 DIAGNOSIS — N28.1 RENAL CYST: ICD-10-CM

## 2022-04-23 DIAGNOSIS — K44.9 HIATAL HERNIA: ICD-10-CM

## 2022-04-23 DIAGNOSIS — K40.90 INGUINAL HERNIA: ICD-10-CM

## 2022-04-23 DIAGNOSIS — K57.90 DIVERTICULOSIS: ICD-10-CM

## 2022-04-23 DIAGNOSIS — N40.0 ENLARGED PROSTATE: ICD-10-CM

## 2022-04-23 LAB
ANION GAP SERPL CALCULATED.3IONS-SCNC: 11 MMOL/L (ref 4–13)
BACTERIA UR QL AUTO: ABNORMAL /HPF
BASOPHILS # BLD AUTO: 0.04 THOUSANDS/ΜL (ref 0–0.1)
BASOPHILS NFR BLD AUTO: 1 % (ref 0–1)
BILIRUB UR QL STRIP: NEGATIVE
BUN SERPL-MCNC: 15 MG/DL (ref 5–25)
CALCIUM SERPL-MCNC: 8.4 MG/DL (ref 8.3–10.1)
CHLORIDE SERPL-SCNC: 108 MMOL/L (ref 100–108)
CLARITY UR: CLEAR
CO2 SERPL-SCNC: 24 MMOL/L (ref 21–32)
COLOR UR: YELLOW
CREAT SERPL-MCNC: 0.92 MG/DL (ref 0.6–1.3)
EOSINOPHIL # BLD AUTO: 0.21 THOUSAND/ΜL (ref 0–0.61)
EOSINOPHIL NFR BLD AUTO: 3 % (ref 0–6)
ERYTHROCYTE [DISTWIDTH] IN BLOOD BY AUTOMATED COUNT: 12.8 % (ref 11.6–15.1)
GFR SERPL CREATININE-BSD FRML MDRD: 73 ML/MIN/1.73SQ M
GLUCOSE SERPL-MCNC: 118 MG/DL (ref 65–140)
GLUCOSE UR STRIP-MCNC: NEGATIVE MG/DL
HCT VFR BLD AUTO: 41.5 % (ref 36.5–49.3)
HGB BLD-MCNC: 13.8 G/DL (ref 12–17)
HGB UR QL STRIP.AUTO: ABNORMAL
IMM GRANULOCYTES # BLD AUTO: 0.03 THOUSAND/UL (ref 0–0.2)
IMM GRANULOCYTES NFR BLD AUTO: 0 % (ref 0–2)
KETONES UR STRIP-MCNC: NEGATIVE MG/DL
LEUKOCYTE ESTERASE UR QL STRIP: NEGATIVE
LYMPHOCYTES # BLD AUTO: 1.01 THOUSANDS/ΜL (ref 0.6–4.47)
LYMPHOCYTES NFR BLD AUTO: 14 % (ref 14–44)
MCH RBC QN AUTO: 32 PG (ref 26.8–34.3)
MCHC RBC AUTO-ENTMCNC: 33.3 G/DL (ref 31.4–37.4)
MCV RBC AUTO: 96 FL (ref 82–98)
MONOCYTES # BLD AUTO: 0.75 THOUSAND/ΜL (ref 0.17–1.22)
MONOCYTES NFR BLD AUTO: 10 % (ref 4–12)
NEUTROPHILS # BLD AUTO: 5.25 THOUSANDS/ΜL (ref 1.85–7.62)
NEUTS SEG NFR BLD AUTO: 72 % (ref 43–75)
NITRITE UR QL STRIP: NEGATIVE
NON-SQ EPI CELLS URNS QL MICRO: ABNORMAL /HPF
NRBC BLD AUTO-RTO: 0 /100 WBCS
PH UR STRIP.AUTO: 7 [PH] (ref 4.5–8)
PLATELET # BLD AUTO: 166 THOUSANDS/UL (ref 149–390)
PMV BLD AUTO: 11 FL (ref 8.9–12.7)
POTASSIUM SERPL-SCNC: 3.8 MMOL/L (ref 3.5–5.3)
PROT UR STRIP-MCNC: NEGATIVE MG/DL
RBC # BLD AUTO: 4.31 MILLION/UL (ref 3.88–5.62)
RBC #/AREA URNS AUTO: ABNORMAL /HPF
SODIUM SERPL-SCNC: 143 MMOL/L (ref 136–145)
SP GR UR STRIP.AUTO: >=1.03 (ref 1–1.03)
UROBILINOGEN UR QL STRIP.AUTO: 0.2 E.U./DL
WBC # BLD AUTO: 7.29 THOUSAND/UL (ref 4.31–10.16)
WBC #/AREA URNS AUTO: ABNORMAL /HPF

## 2022-04-23 PROCEDURE — 74177 CT ABD & PELVIS W/CONTRAST: CPT

## 2022-04-23 PROCEDURE — 99285 EMERGENCY DEPT VISIT HI MDM: CPT | Performed by: PHYSICIAN ASSISTANT

## 2022-04-23 PROCEDURE — 36415 COLL VENOUS BLD VENIPUNCTURE: CPT | Performed by: PHYSICIAN ASSISTANT

## 2022-04-23 PROCEDURE — 81001 URINALYSIS AUTO W/SCOPE: CPT

## 2022-04-23 PROCEDURE — G1004 CDSM NDSC: HCPCS

## 2022-04-23 PROCEDURE — 99284 EMERGENCY DEPT VISIT MOD MDM: CPT

## 2022-04-23 PROCEDURE — 85025 COMPLETE CBC W/AUTO DIFF WBC: CPT | Performed by: PHYSICIAN ASSISTANT

## 2022-04-23 PROCEDURE — 80048 BASIC METABOLIC PNL TOTAL CA: CPT | Performed by: PHYSICIAN ASSISTANT

## 2022-04-23 RX ORDER — FINASTERIDE 5 MG/1
5 TABLET, FILM COATED ORAL DAILY
Qty: 10 TABLET | Refills: 0 | Status: SHIPPED | OUTPATIENT
Start: 2022-04-23 | End: 2022-04-27

## 2022-04-23 RX ORDER — TAMSULOSIN HYDROCHLORIDE 0.4 MG/1
0.4 CAPSULE ORAL
Qty: 10 CAPSULE | Refills: 0 | Status: SHIPPED | OUTPATIENT
Start: 2022-04-23 | End: 2022-04-27

## 2022-04-23 RX ADMIN — IOHEXOL 100 ML: 350 INJECTION, SOLUTION INTRAVENOUS at 07:54

## 2022-04-23 NOTE — TELEPHONE ENCOUNTER
Patient was in ER for urinary retention  Mo catheter was successfully place  Recommended discharge with follow up outpatient as well as initiation of finasteride and flomax dual medical therapy  Please schedule patient in office for visit and voiding trial within 2-3 weeks  Thanks!

## 2022-04-23 NOTE — DISCHARGE INSTRUCTIONS
DISCHARGE INSTRUCTIONS:    FOLLOW UP WITH YOUR PRIMARY CARE PROVIDER OR THE [address] [place] [address]  MAKE AN APPOINTMENT TO BE SEEN  TAKE MEDICATION AS PRESCRIBED  IF RASH, SHORTNESS OF BREATH OR TROUBLE SWALLOWING, STOP TAKING THE MEDICATION AND BE SEEN  FOLLOW UP WITH UROLOGY ON Monday  REST AND DRINK PLENTY OF FLUIDS  IF SYMPTOMS WORSEN OR NEW SYMPTOMS ARISE, RETURN TO THE ER TO BE SEEN

## 2022-04-23 NOTE — ED NOTES
Provided patient with leg bag at this time; Jameson Bettencourt RN provided education on leg bag at this time; patient verbalized understanding of education       [facility]  04/23/22 1435

## 2022-04-23 NOTE — ED NOTES
Patient ambulatory with Charlene Bernabe ED tech; patient ambulated with steady gait at this time       [facility], [address] [facility]  04/23/22 5726

## 2022-04-23 NOTE — ED PROVIDER NOTES
History  Chief Complaint   Patient presents with    Urinary Retention     from [facility]  Pt went to urinate this morning per his routine and was unable to do so  Pt reports pressure over his bladder and burning with attempting to urinating     89y  o male with PMH of anemia, arthritis, CAD, colon cancer, HLD, HTN, inguinal hernia and sciatica presents to the ER for urinary retention since 0200 this morning  Patient states he initially woke up and had dribbling but then was unable to urinate at all  Patient states he was following with [facility]rology in the past for an enlarged prostate but stopped following with them  He denies pain  He denies fever, chills, URI symptoms, chest pain, dyspnea, N/V/D, abdominal pain, dysuria, urgency, frequency, hematuria, flank pain, weakness or paresthesias  Patient states he was taking Flomax in the past but feels it was not helping so he stopped taking it  History provided by:  Patient   used: No        Prior to Admission Medications   Prescriptions Last Dose Informant Patient Reported? Taking?    Multiple Vitamins-Minerals (MULTIVITAL-M PO)  Self Yes No   Sig: Take 1 tablet by mouth   amLODIPine (NORVASC) 2 5 mg tablet   No No   Sig: Take 1 tablet (2 5 mg total) by mouth daily   aspirin 81 mg chewable tablet  Self Yes No   Sig: Chew 81 mg   dicyclomine (BENTYL) 20 mg tablet   No No   Sig: Take 1 tablet (20 mg total) by mouth 2 (two) times a day   folic acid (FOLVITE) 249 MCG tablet  Self Yes No   Sig: Take 400 mcg by mouth daily   lisinopril (ZESTRIL) 20 mg tablet   No No   Sig: Take 1 tablet (20 mg total) by mouth daily   metoprolol tartrate (LOPRESSOR) 50 mg tablet   No No   Sig: Take 1 tablet (50 mg total) by mouth every 12 (twelve) hours   ondansetron (ZOFRAN-ODT) 4 mg disintegrating tablet   No No   Sig: Take 1 tablet (4 mg total) by mouth every 8 (eight) hours as needed for nausea   pantoprazole (PROTONIX) 40 mg tablet   No No Sig: Take 1 tablet (40 mg total) by mouth daily   simvastatin (ZOCOR) 20 mg tablet   No No   Sig: Take 1 tablet (20 mg total) by mouth daily at bedtime      Facility-Administered Medications Last Administration Doses Remaining   aspirin chewable tablet 324 mg 11/5/2021  9:06 AM           Past Medical History:   Diagnosis Date    Anemia, iron deficiency     Drug Therapy, poor absorbtion documented    Appendicitis     Arthritis 1/25/2019    Cardiac disease     Colon cancer ([address] ) 12/2013    Resected: BREANNA    Congenital nystagmus     Left    Hyperlipidemia     Hypertension     Inguinal hernia     Sciatica 2010    resolved from problem list-2011       Past Surgical History:   Procedure Laterality Date    APPENDECTOMY      Appendicitis    CHOLECYSTECTOMY      COLON SIGMOID RESECTION  12/2013    COLON SURGERY      CORONARY ARTERY BYPASS GRAFT      x 2    CORONARY ARTERY BYPASS GRAFT      x2    CORONARY ARTERY BYPASS GRAFT      CORONARY ARTERY BYPASS GRAFT      INGUINAL HERNIA REPAIR      SHOULDER ARTHROCENTESIS Right     sub-acromial bursa area       Family History   Problem Relation Age of Onset    Hypertension Father     Heart disease Father     Heart disease Mother     Cancer Sister     Cancer Brother      I have reviewed and agree with the history as documented  E-Cigarette/Vaping    E-Cigarette Use Never User      E-Cigarette/Vaping Substances    Nicotine No     THC No     CBD No     Flavoring No     Other No     Unknown No      Social History     Tobacco Use    Smoking status: Never Smoker    Smokeless tobacco: Never Used   Vaping Use    Vaping Use: Never used   Substance Use Topics    Alcohol use: No    Drug use: No       Review of Systems   Constitutional: Negative for activity change, appetite change, chills and fever  HENT: Negative for congestion, drooling, ear discharge, ear pain, facial swelling, rhinorrhea and sore throat  Eyes: Negative for redness  Respiratory: Negative for cough and shortness of breath  Cardiovascular: Negative for chest pain  Gastrointestinal: Negative for abdominal pain, diarrhea, nausea and vomiting  Genitourinary: Positive for difficulty urinating  Negative for dysuria, flank pain, frequency, hematuria, penile pain, penile swelling, scrotal swelling, testicular pain and urgency  Musculoskeletal: Negative for neck stiffness  Skin: Negative for rash  Allergic/Immunologic: Negative for food allergies  Neurological: Negative for weakness and numbness  Physical Exam  Physical Exam  Vitals and nursing note reviewed  Constitutional:       General: He is not in acute distress  Appearance: He is not toxic-appearing  HENT:      Head: Normocephalic and atraumatic  Eyes:      Conjunctiva/sclera: Conjunctivae normal    Neck:      Trachea: No tracheal deviation  Cardiovascular:      Rate and Rhythm: Normal rate and regular rhythm  Heart sounds: Normal heart sounds, S1 normal and S2 normal  No murmur heard  No friction rub  No gallop  Pulmonary:      Effort: Pulmonary effort is normal  No respiratory distress  Breath sounds: Normal breath sounds  No decreased breath sounds, wheezing, rhonchi or rales  Chest:      Chest wall: No tenderness  Abdominal:      General: Bowel sounds are normal  There is no distension  Palpations: Abdomen is soft  Tenderness: There is no abdominal tenderness  There is no right CVA tenderness, left CVA tenderness, guarding or rebound  Genitourinary:     Comments: Mo in place and draining yellow urine  700mL in bag  Musculoskeletal:      Cervical back: Normal range of motion and neck supple  Skin:     General: Skin is warm and dry  Findings: No rash  Neurological:      Mental Status: He is alert  GCS: GCS eye subscore is 4  GCS verbal subscore is 5  GCS motor subscore is 6     Psychiatric:         Mood and Affect: Mood normal          Vital Signs  ED Triage Vitals [04/23/22 0550]   Temperature Pulse Respirations Blood Pressure SpO2   97 5 °F (36 4 °C) 63 16 (!) 207/108 99 %      Temp Source Heart Rate Source Patient Position - Orthostatic VS BP Location FiO2 (%)   Oral Monitor Lying Right arm --      Pain Score       9           Vitals:    04/23/22 0550 04/23/22 0639 04/23/22 0641 04/23/22 0946   BP: (!) 207/108 160/79 (!) 181/82 (!) 178/87   Pulse: 63  59 60   Patient Position - Orthostatic VS: Lying  Lying Sitting         Visual Acuity      ED Medications  Medications   iohexol (OMNIPAQUE) 350 MG/ML injection (SINGLE-DOSE) 100 mL (100 mL Intravenous Given 4/23/22 0754)       Diagnostic Studies  Results Reviewed     Procedure Component Value Units Date/Time    Urine Microscopic [[id number]]  (Abnormal) Collected: 04/23/22 0657    Lab Status: Final result Specimen: Urine, Suprapubic catheter Updated: 04/23/22 0804     RBC, UA 4-10 /hpf      WBC, UA None Seen /hpf      Epithelial Cells None Seen /hpf      Bacteria, UA None Seen /hpf     Basic metabolic panel [[id number]] Collected: 04/23/22 0648    Lab Status: Final result Specimen: Blood from Arm, Right Updated: 04/23/22 0704     Sodium 143 mmol/L      Potassium 3 8 mmol/L      Chloride 108 mmol/L      CO2 24 mmol/L      ANION GAP 11 mmol/L      BUN 15 mg/dL      Creatinine 0 92 mg/dL      Glucose 118 mg/dL      Calcium 8 4 mg/dL      eGFR 73 ml/min/1 73sq m     Narrative:      [place] guidelines for Chronic Kidney Disease (CKD):     Stage 1 with normal or high GFR (GFR > 90 mL/min/1 73 square meters)    Stage 2 Mild CKD (GFR = 60-89 mL/min/1 73 square meters)    Stage 3A Moderate CKD (GFR = 45-59 mL/min/1 73 square meters)    Stage 3B Moderate CKD (GFR = 30-44 mL/min/1 73 square meters)    Stage 4 Severe CKD (GFR = 15-29 mL/min/1 73 square meters)    Stage 5 End Stage CKD (GFR <15 mL/min/1 73 square meters)  Note: GFR calculation is accurate only with a steady state creatinine    Urine Macroscopic, POC [[id number]]  (Abnormal) Collected: 04/23/22 0657    Lab Status: Final result Specimen: Urine Updated: 04/23/22 0658     Color, UA Yellow     Clarity, UA Clear     pH, UA 7 0     Leukocytes, UA Negative     Nitrite, UA Negative     Protein, UA Negative mg/dl      Glucose, UA Negative mg/dl      Ketones, UA Negative mg/dl      Urobilinogen, UA 0 2 E U /dl      Bilirubin, UA Negative     Blood, UA Small     Specific [place], UA >=1 030    Narrative:      CLINITEK RESULT    CBC and differential [[id number]] Collected: 04/23/22 0648    Lab Status: Final result Specimen: Blood from Arm, Right Updated: 04/23/22 0655     WBC 7 29 Thousand/uL      RBC 4 31 Million/uL      Hemoglobin 13 8 g/dL      Hematocrit 41 5 %      MCV 96 fL      MCH 32 0 pg      MCHC 33 3 g/dL      RDW 12 8 %      MPV 11 0 fL      Platelets 637 Thousands/uL      nRBC 0 /100 WBCs      Neutrophils Relative 72 %      Immat GRANS % 0 %      Lymphocytes Relative 14 %      Monocytes Relative 10 %      Eosinophils Relative 3 %      Basophils Relative 1 %      Neutrophils Absolute 5 25 Thousands/µL      Immature Grans Absolute 0 03 Thousand/uL      Lymphocytes Absolute 1 01 Thousands/µL      Monocytes Absolute 0 75 Thousand/µL      Eosinophils Absolute 0 21 Thousand/µL      Basophils Absolute 0 04 Thousands/µL                  CT abdomen pelvis with contrast   Final Result by Argelia Sarabia DO (04/23 1509)   1  Mo catheter in the urinary bladder  Bladder incompletely distended  2   Markedly enlarged prostate gland  3   Additional, stable incidental findings as described above  Given the patient's symptoms, recommend follow-up urology consultation  Workstation performed: [id number]                    Procedures  Procedures         ED Course  ED Course as of 04/23/22 1210   Sat Apr 23, 2022   0825 Spoke with Anson Brain from Urology   Will discharge home with Mo, Flomax 0 4mg HS and Finasteride 5mg qd  SBIRT 20yo+      Most Recent Value   SBIRT (22 yo +)    In order to provide better care to our patients, we are screening all of our patients for alcohol and drug use  Would it be okay to ask you these screening questions? Yes Filed at: 04/23/2022 4413   Initial Alcohol Screen: US AUDIT-C     1  How often do you have a drink containing alcohol? 0 Filed at: 04/23/2022 0553   2  How many drinks containing alcohol do you have on a typical day you are drinking? 0 Filed at: 04/23/2022 0553   3a  Male UNDER 65: How often do you have five or more drinks on one occasion? 0 Filed at: 04/23/2022 0553   3b  FEMALE Any Age, or MALE 65+: How often do you have 4 or more drinks on one occassion? 0 Filed at: 04/23/2022 0553   Audit-C Score 0 Filed at: 04/23/2022 3459   CRISTIANE: How many times in the past year have you    Used an illegal drug or used a prescription medication for non-medical reasons? Never Filed at: 04/23/2022 0553                    MDM  Number of Diagnoses or Management Options  Acute urinary retention: new and requires workup  Diverticulosis: new and requires workup  Enlarged prostate: new and requires workup  Hiatal hernia: new and requires workup  Inguinal hernia: new and requires workup  Renal cyst: new and requires workup  Diagnosis management comments: DDX consists of but not limited to: BPH, urinary retention, infection, constipation, cancer, BENSON    Mo was placed  Will check labs and imaging  8341 Spoke with Clovis Pettyo from Urology  Will discharge home with Mo, Flomax 0 4mg HS and Finasteride 5mg qd  Will give Urology follow up  Informed patient of lab and imaging findings  Patient agreeable to plan  Will discharge  The management plan was discussed in detail with the patient at bedside and all questions were answered  Prior to discharge, we provided both verbal and written instructions    We discussed with the patient the signs and symptoms for which to return to the emergency department  All questions were answered and patient was comfortable with the plan of care and discharged to home  Instructed the patient to follow up with the primary care provider and/or specialist provided and their written instructions  The patient verbalized understanding of our discussion and plan of care, and agrees to return to the Emergency Department for concerns and progression of illness  At discharge, I instructed the patient to:  -follow up with pcp  -take Flomax and Finasteride as prescribed  -rest and drink plenty of fluids  -follow up with Urology  -return to the ER if symptoms worsened or new symptoms arose  Patient agreed to this plan and was stable at time of discharge           Amount and/or Complexity of Data Reviewed  Clinical lab tests: ordered and reviewed  Tests in the radiology section of CPT®: ordered and reviewed  Discuss the patient with other providers: yes    Patient Progress  Patient progress: stable      Disposition  Final diagnoses:   Acute urinary retention   Enlarged prostate   Renal cyst   Hiatal hernia   Diverticulosis   Inguinal hernia     Time reflects when diagnosis was documented in both MDM as applicable and the Disposition within this note     Time User Action Codes Description Comment    4/23/2022  8:27 AM [facility] Mandy A Add [R33 9] Urinary retention     4/23/2022  8:27 AM [facility] Mandy A Remove [R33 9] Urinary retention     4/23/2022  8:27 AM [facility] Mandy A Add [R33 8] Acute urinary retention     4/23/2022  8:27 AM [place] Mandy A Add [N40 0] Enlarged prostate     4/23/2022  8:29 AM [facility] Mandy A Add [N28 1] Renal cyst     4/23/2022  8:29 AM [facility] Mandy A Add [K44 9] Hiatal hernia     4/23/2022  8:29 AM [facility] Mandy A Add [K57 90] Diverticulosis     4/23/2022  8:29 AM [facility] Mandy A Add [K40 90] Inguinal hernia       ED Disposition     ED Disposition Condition Date/Time Comment    Discharge Stable Sat Apr 23, 2022  8:27 AM Flori Steve Denise 104 discharge to home/self care              Follow-up Information     Follow up With Specialties Details Why Contact Info Additional Information    Leroy Powers MD Family Medicine Schedule an appointment as soon as possible for a visit  As needed [address]       [facility] [place] [facility] Schedule an appointment as soon as possible for a visit in 1 day  [facility] [id number]  7  [facility],  [facility], [place], [place], [id number] [phone]          Discharge Medication List as of 4/23/2022  8:31 AM      START taking these medications    Details   finasteride (PROSCAR) 5 mg tablet Take 1 tablet (5 mg total) by mouth daily, Starting Sat 4/23/2022, Normal      tamsulosin (FLOMAX) 0 4 mg Take 1 capsule (0 4 mg total) by mouth daily at bedtime, Starting Sat 4/23/2022, Normal         CONTINUE these medications which have NOT CHANGED    Details   amLODIPine (NORVASC) 2 5 mg tablet Take 1 tablet (2 5 mg total) by mouth daily, Starting Thu 3/31/2022, Normal      aspirin 81 mg chewable tablet Chew 81 mg, Historical Med      dicyclomine (BENTYL) 20 mg tablet Take 1 tablet (20 mg total) by mouth 2 (two) times a day, Starting Tue 2/8/4222, Normal      folic acid (FOLVITE) 033 MCG tablet Take 400 mcg by mouth daily, Historical Med      lisinopril (ZESTRIL) 20 mg tablet Take 1 tablet (20 mg total) by mouth daily, Starting Sun 11/14/2021, Normal      metoprolol tartrate (LOPRESSOR) 50 mg tablet Take 1 tablet (50 mg total) by mouth every 12 (twelve) hours, Starting Wed 6/23/2021, Normal      Multiple Vitamins-Minerals (MULTIVITAL-M PO) Take 1 tablet by mouth, Historical Med      ondansetron (ZOFRAN-ODT) 4 mg disintegrating tablet Take 1 tablet (4 mg total) by mouth every 8 (eight) hours as needed for nausea, Starting Tue 2/1/2022, Normal      pantoprazole (PROTONIX) 40 mg tablet Take 1 tablet (40 mg total) by mouth daily, Starting Thu 11/18/2021, Normal      simvastatin (ZOCOR) 20 mg tablet Take 1 tablet (20 mg total) by mouth daily at bedtime, Starting Thu 2/3/2022, Normal             No discharge procedures on file      PDMP Review     None          ED Provider  Electronically Signed by           Kasandra Campos PA-C  04/23/22 9356

## 2022-04-25 ENCOUNTER — TELEPHONE (OUTPATIENT)
Dept: INTERNAL MEDICINE CLINIC | Facility: OTHER | Age: 87
End: 2022-04-25

## 2022-04-25 NOTE — TELEPHONE ENCOUNTER
Patient previously seen by Kapil Mancilla at the [facility]  Patient called to schedule appointment to get shin catheter removed that was placed in the ER this past weekend  He can be reached at [phone]

## 2022-04-27 ENCOUNTER — OFFICE VISIT (OUTPATIENT)
Dept: UROLOGY | Facility: AMBULATORY SURGERY CENTER | Age: 87
End: 2022-04-27
Payer: MEDICARE

## 2022-04-27 ENCOUNTER — TELEPHONE (OUTPATIENT)
Dept: UROLOGY | Facility: AMBULATORY SURGERY CENTER | Age: 87
End: 2022-04-27

## 2022-04-27 ENCOUNTER — NURSE TRIAGE (OUTPATIENT)
Dept: OTHER | Facility: OTHER | Age: 87
End: 2022-04-27

## 2022-04-27 VITALS
BODY MASS INDEX: 22.9 KG/M2 | SYSTOLIC BLOOD PRESSURE: 102 MMHG | HEART RATE: 58 BPM | DIASTOLIC BLOOD PRESSURE: 62 MMHG | WEIGHT: 160 LBS | OXYGEN SATURATION: 98 % | HEIGHT: 70 IN

## 2022-04-27 DIAGNOSIS — N40.0 ENLARGED PROSTATE: ICD-10-CM

## 2022-04-27 DIAGNOSIS — R33.8 ACUTE URINARY RETENTION: ICD-10-CM

## 2022-04-27 PROCEDURE — 99213 OFFICE O/P EST LOW 20 MIN: CPT | Performed by: NURSE PRACTITIONER

## 2022-04-27 RX ORDER — FINASTERIDE 5 MG/1
5 TABLET, FILM COATED ORAL DAILY
Qty: 90 TABLET | Refills: 3 | Status: SHIPPED | OUTPATIENT
Start: 2022-04-27

## 2022-04-27 RX ORDER — TAMSULOSIN HYDROCHLORIDE 0.4 MG/1
0.4 CAPSULE ORAL
Qty: 90 CAPSULE | Refills: 3 | Status: SHIPPED | OUTPATIENT
Start: 2022-04-27

## 2022-04-27 NOTE — TELEPHONE ENCOUNTER
Called and LM for patient that he should hydrate with water and urine should clear up  Advised that if it doesn't, to call the office back

## 2022-04-27 NOTE — TELEPHONE ENCOUNTER
Patient wanted to know how long before his urine should clear up  The Patient also wanted to make an appointment to have his catheter out  Patient is requesting a call back

## 2022-04-27 NOTE — TELEPHONE ENCOUNTER
Called and spoke with patient  Advised that as he increases his water intake, his urine should get clearer  Advised that if it doesn't to call back  Also advised on void trial for 5/9  Patient was insistent on getting catheter out sooner  Advised that per the AP's note, catheter was to stay in 2-3 weeks  Verified with Carmela Martin, okay to pull next week   Rescheduled 5/4

## 2022-04-27 NOTE — TELEPHONE ENCOUNTER
Increase hydration with water and observe at this time  Patient reported gross hematuria in the office this morning after straining to have a BM  He had catheter placed 4 days ago   If blood does not clear with hydration, please notify our office

## 2022-04-27 NOTE — PROGRESS NOTES
04/27/22    [place] Ahuja   5/14/1932   [id number]     Assessment  1 BPH with LUTS  2 Urinary retention     Discussion/Plan  1 BPH with LUTS  2 Urinary retention   Hydration with increased water, ambulation, avoid constipation   Continue Finasteride and Tamsulosin, refills provided     Patient will return for void trial with nursing  All questions answered at this time  Subjective  HPI   Ban Wagner is a pleasant 80year old male who presents to the office s/p shin catheter placement in the ER 4/23/22  He lives at [place] [address] with his wife  He was unable to urinate in the morning and endorsed burning and pressure  He is known to our practice for management of BPH  He reportedly stopped taking Tamsulosin prior to this  He was managed on Tamsulosin due to nocturia, straining and weak stream with post-void dribbling  He admits he needs to hydrate with more water  He is wearing leg bag and states he saw some blood after straining to have a bowel movement  He was discharged with Tamsulosin and Finasteride from the ER  He denies fever, chills, pain, or difficulties with catheter  Review of Systems - History obtained from chart review and the patient  General ROS: negative  Psychological ROS: negative  Endocrine ROS: negative  Respiratory ROS: no cough, shortness of breath, or wheezing  Cardiovascular ROS: no chest pain or dyspnea on exertion  Gastrointestinal ROS: no abdominal pain, change in bowel habits, or black or bloody stools  Genito-Urinary ROS: negative  Musculoskeletal ROS: negative  Neurological ROS: negative  Dermatological ROS: negative       Objective  Physical Exam  Vitals and nursing note reviewed  Constitutional:       General: He is awake  He is not in acute distress  Appearance: Normal appearance  He is well-developed, well-groomed and normal weight  He is not ill-appearing, toxic-appearing or diaphoretic     Pulmonary:      Effort: Pulmonary effort is normal  Abdominal:      Tenderness: There is no right CVA tenderness or left CVA tenderness  Musculoskeletal:         General: Normal range of motion  Cervical back: Normal range of motion and neck supple  Skin:     General: Skin is warm  Neurological:      General: No focal deficit present  Mental Status: He is alert and oriented to person, place, and time  Mental status is at baseline  Psychiatric:         Attention and Perception: Attention normal          Mood and Affect: Mood normal          Speech: Speech normal          Behavior: Behavior normal  Behavior is cooperative  Thought Content: Thought content normal          Cognition and Memory: Cognition normal          Judgment: Judgment normal               JEFF Velarde     I have spent 15 minutes with Patient  today in which greater than 50% of this time was spent in counseling/coordination of care regarding Intructions for management

## 2022-04-27 NOTE — TELEPHONE ENCOUNTER
Patient was seen in office this morning and now reports bloody urine since he returned home  Please follow up with patient  Reason for Disposition   Pink or red-colored urine and likely from food (beets, rhubarb, red food dye) and lasts > 24 hours after stopping food    Answer Assessment - Initial Assessment Questions  1  COLOR of URINE: "Describe the color of the urine "  (e g , tea-colored, pink, red, blood clots, bloody)      Urine is red    2  ONSET: "When did the bleeding start?"       Past two hours since office visit this morning    3  EPISODES: "How many times has there been blood in the urine?" or "How many times today?"      First episode since initial insertion of catheter    4  PAIN with URINATION: "Is there any pain with passing your urine?" If Yes, ask: "How bad is the pain?"  (Scale 1-10; or mild, moderate, severe)     - MILD - complains slightly about urination hurting     - MODERATE - interferes with normal activities       - SEVERE - excruciating, unwilling or unable to urinate because of the pain      Denies    5  FEVER: "Do you have a fever?" If Yes, ask: "What is your temperature, how was it measured, and when did it start?"      Denies    6  ASSOCIATED SYMPTOMS: "Are you passing urine more frequently than usual?"      Denies  7   OTHER SYMPTOMS: "Do you have any other symptoms?" (e g , back/flank pain, abdominal pain, vomiting)      Denies    Protocols used: URINE - BLOOD IN-ADULT-OH

## 2022-05-03 ENCOUNTER — OFFICE VISIT (OUTPATIENT)
Dept: INTERNAL MEDICINE CLINIC | Facility: OTHER | Age: 87
End: 2022-05-03
Payer: MEDICARE

## 2022-05-03 VITALS
OXYGEN SATURATION: 98 % | SYSTOLIC BLOOD PRESSURE: 100 MMHG | HEART RATE: 57 BPM | HEIGHT: 70 IN | WEIGHT: 157.9 LBS | TEMPERATURE: 98.4 F | BODY MASS INDEX: 22.6 KG/M2 | DIASTOLIC BLOOD PRESSURE: 58 MMHG | RESPIRATION RATE: 18 BRPM

## 2022-05-03 DIAGNOSIS — N40.1 BPH WITH OBSTRUCTION/LOWER URINARY TRACT SYMPTOMS: Primary | ICD-10-CM

## 2022-05-03 DIAGNOSIS — I48.91 ATRIAL FIBRILLATION, NEW ONSET (HCC): ICD-10-CM

## 2022-05-03 DIAGNOSIS — K21.9 GASTROESOPHAGEAL REFLUX DISEASE WITHOUT ESOPHAGITIS: ICD-10-CM

## 2022-05-03 DIAGNOSIS — E78.5 DYSLIPIDEMIA: ICD-10-CM

## 2022-05-03 DIAGNOSIS — I10 ESSENTIAL HYPERTENSION: ICD-10-CM

## 2022-05-03 DIAGNOSIS — I25.10 CORONARY ARTERY DISEASE INVOLVING NATIVE CORONARY ARTERY OF NATIVE HEART WITHOUT ANGINA PECTORIS: ICD-10-CM

## 2022-05-03 DIAGNOSIS — N13.8 BPH WITH OBSTRUCTION/LOWER URINARY TRACT SYMPTOMS: Primary | ICD-10-CM

## 2022-05-03 PROBLEM — R32 URINARY INCONTINENCE: Status: RESOLVED | Noted: 2019-01-30 | Resolved: 2022-05-03

## 2022-05-03 PROBLEM — U07.1 COVID-19: Status: RESOLVED | Noted: 2022-03-08 | Resolved: 2022-05-03

## 2022-05-03 PROBLEM — R07.9 CHEST PAIN: Status: RESOLVED | Noted: 2021-11-08 | Resolved: 2022-05-03

## 2022-05-03 PROBLEM — M79.89 SWELLING OF RIGHT FOOT: Chronic | Status: RESOLVED | Noted: 2019-06-21 | Resolved: 2022-05-03

## 2022-05-03 PROBLEM — R55 SYNCOPAL EPISODES: Status: RESOLVED | Noted: 2018-10-24 | Resolved: 2022-05-03

## 2022-05-03 PROBLEM — E86.0 DEHYDRATION: Status: RESOLVED | Noted: 2021-11-08 | Resolved: 2022-05-03

## 2022-05-03 PROCEDURE — 99214 OFFICE O/P EST MOD 30 MIN: CPT | Performed by: INTERNAL MEDICINE

## 2022-05-03 RX ORDER — METOPROLOL TARTRATE 100 MG/1
TABLET ORAL
COMMUNITY
Start: 2022-04-11 | End: 2022-05-03

## 2022-05-03 NOTE — PROGRESS NOTES
Assessment/Plan:    Essential hypertension  Blood pressure on the softer side today  Discontinue amlodipine  Continue lisinopril 20 mg daily and metoprolol tartrate 50 mg twice a day  CAD (coronary artery disease), native coronary artery  Continue metoprolol and statin therapy  BPH with obstruction/lower urinary tract symptoms  Continue follow-up with Urology  Continue finasteride and tamsulosin  Dyslipidemia  Continue simvastatin 20 mg daily  Diagnoses and all orders for this visit:    BPH with obstruction/lower urinary tract symptoms    Gastroesophageal reflux disease without esophagitis    Essential hypertension  -     Cancel: CBC; Future  -     Cancel: Comprehensive metabolic panel; Future  -     Cancel: Lipid panel; Future  -     CBC; Future  -     Comprehensive metabolic panel; Future  -     Lipid panel; Future    Coronary artery disease involving native coronary artery of native heart without angina pectoris  -     Cancel: CBC; Future  -     Cancel: Comprehensive metabolic panel; Future  -     Cancel: Lipid panel; Future    Dyslipidemia  -     Cancel: CBC; Future  -     Cancel: Comprehensive metabolic panel; Future  -     Cancel: Lipid panel; Future  -     CBC; Future  -     Comprehensive metabolic panel; Future  -     Lipid panel; Future    Atrial fibrillation, new onset ([facility] [address] )    Other orders  -     Discontinue: metoprolol tartrate (LOPRESSOR) 100 mg tablet;  (Patient not taking: Reported on 5/3/2022 )                Subjective:      Patient ID: [place] Ahuja is a 80 y o  male  Chief Complaint   Patient presents with    Follow-up     ER 4/23 urinary retention  has a leg bag in possibly getting it out tomorrow    health maintenance     nothing due    routine check up     states he will probably be due for his routine f/u in june and AWV and f/u in dec       80year old male is seen today for ER follow up for urinary retention, ER visit on 4/23/2022     He was seen for urinary retention, despite taking tamsulosin  A shin catheter was inserted and he was started on finasteride  He did follow-up with Urology who recommended continuing finasteride and tamsulosin  He has an evaluation with Urology tomorrow to remove the Shin catheter  Otherwise, he has been compliant with his medication regimen  Hypertension  This is a chronic problem  The current episode started more than 1 year ago  The problem is unchanged  The problem is controlled  Pertinent negatives include no chest pain, headaches, palpitations or shortness of breath  Past treatments include calcium channel blockers, beta blockers and ACE inhibitors  The current treatment provides moderate improvement  There are no compliance problems  Hyperlipidemia  This is a chronic problem  The current episode started more than 1 year ago  The problem is controlled  Recent lipid tests were reviewed and are normal  Pertinent negatives include no chest pain or shortness of breath  Current antihyperlipidemic treatment includes statins  The current treatment provides moderate improvement of lipids  There are no compliance problems  The following portions of the patient's history were reviewed and updated as appropriate: allergies, current medications, past family history, past medical history, past social history, past surgical history and problem list     Review of Systems   Constitutional: Negative for activity change, appetite change, chills, diaphoresis, fatigue and fever  HENT: Negative for congestion, postnasal drip, rhinorrhea, sinus pressure, sinus pain, sneezing and sore throat  Eyes: Negative for visual disturbance  Respiratory: Negative for apnea, cough, choking, chest tightness, shortness of breath and wheezing  Cardiovascular: Negative for chest pain, palpitations and leg swelling     Gastrointestinal: Negative for abdominal distention, abdominal pain, anal bleeding, blood in stool, constipation, diarrhea, nausea and vomiting  Endocrine: Negative for cold intolerance and heat intolerance  Genitourinary: Negative for difficulty urinating, dysuria and hematuria  Musculoskeletal: Negative  Skin: Negative  Neurological: Negative for dizziness, weakness, light-headedness, numbness and headaches  Hematological: Negative for adenopathy  Psychiatric/Behavioral: Negative for agitation, sleep disturbance and suicidal ideas  All other systems reviewed and are negative          Past Medical History:   Diagnosis Date    Anemia, iron deficiency     Drug Therapy, poor absorbtion documented    Appendicitis     Arthritis 1/25/2019    Cardiac disease     Colon cancer ([facility] [address] ) 12/2013    Resected: BREANNA    Congenital nystagmus     Left    Hyperlipidemia     Hypertension     Inguinal hernia     Sciatica 2010    resolved from problem list-2011         Current Outpatient Medications:     aspirin 81 mg chewable tablet, Chew 81 mg, Disp: , Rfl:     finasteride (PROSCAR) 5 mg tablet, Take 1 tablet (5 mg total) by mouth daily, Disp: 90 tablet, Rfl: 3    folic acid (FOLVITE) 497 MCG tablet, Take 400 mcg by mouth daily, Disp: , Rfl:     lisinopril (ZESTRIL) 20 mg tablet, Take 1 tablet (20 mg total) by mouth daily, Disp: 90 tablet, Rfl: 3    metoprolol tartrate (LOPRESSOR) 50 mg tablet, Take 1 tablet (50 mg total) by mouth every 12 (twelve) hours, Disp: 180 tablet, Rfl: 3    Multiple Vitamins-Minerals (MULTIVITAL-M PO), Take 1 tablet by mouth, Disp: , Rfl:     pantoprazole (PROTONIX) 40 mg tablet, Take 1 tablet (40 mg total) by mouth daily (Patient taking differently: Take 40 mg by mouth if needed  ), Disp: 90 tablet, Rfl: 6    simvastatin (ZOCOR) 20 mg tablet, Take 1 tablet (20 mg total) by mouth daily at bedtime, Disp: 90 tablet, Rfl: 3    tamsulosin (FLOMAX) 0 4 mg, Take 1 capsule (0 4 mg total) by mouth daily at bedtime, Disp: 90 capsule, Rfl: 3    Current Facility-Administered Medications:     aspirin chewable tablet 324 mg, 324 mg, Oral, Daily, Becca East PA-C, 324 mg at 11/05/21 9603    Allergies   Allergen Reactions    Pregabalin      Other reaction(s): M/S CHANGE       Social History   Past Surgical History:   Procedure Laterality Date    APPENDECTOMY      Appendicitis    CHOLECYSTECTOMY      COLON SIGMOID RESECTION  12/2013    COLON SURGERY      CORONARY ARTERY BYPASS GRAFT      x 2    CORONARY ARTERY BYPASS GRAFT      x2    CORONARY ARTERY BYPASS GRAFT      CORONARY ARTERY BYPASS GRAFT      INGUINAL HERNIA REPAIR      SHOULDER ARTHROCENTESIS Right     sub-acromial bursa area     Family History   Problem Relation Age of Onset    Hypertension Father     Heart disease Father     Heart disease Mother     Cancer Sister     Cancer Brother        Objective:  /58 (BP Location: Left arm, Patient Position: Sitting, Cuff Size: Standard)   Pulse 57   Temp 98 4 °F (36 9 °C) (Temporal)   Resp 18   Ht 5' 10" (1 778 m)   Wt 71 6 kg (157 lb 14 4 oz)   SpO2 98%   BMI 22 66 kg/m²     Recent Results (from the past 1344 hour(s))   CBC and differential    Collection Time: 04/23/22  6:48 AM   Result Value Ref Range    WBC 7 29 4 31 - 10 16 Thousand/uL    RBC 4 31 3 88 - 5 62 Million/uL    Hemoglobin 13 8 12 0 - 17 0 g/dL    Hematocrit 41 5 36 5 - 49 3 %    MCV 96 82 - 98 fL    MCH 32 0 26 8 - 34 3 pg    MCHC 33 3 31 4 - 37 4 g/dL    RDW 12 8 11 6 - 15 1 %    MPV 11 0 8 9 - 12 7 fL    Platelets 388 049 - 495 Thousands/uL    nRBC 0 /100 WBCs    Neutrophils Relative 72 43 - 75 %    Immat GRANS % 0 0 - 2 %    Lymphocytes Relative 14 14 - 44 %    Monocytes Relative 10 4 - 12 %    Eosinophils Relative 3 0 - 6 %    Basophils Relative 1 0 - 1 %    Neutrophils Absolute 5 25 1 85 - 7 62 Thousands/µL    Immature Grans Absolute 0 03 0 00 - 0 20 Thousand/uL    Lymphocytes Absolute 1 01 0 60 - 4 47 Thousands/µL    Monocytes Absolute 0 75 0 17 - 1 22 Thousand/µL    Eosinophils Absolute 0 21 0 00 - 0 61 Thousand/µL Basophils Absolute 0 04 0 00 - 0 10 Thousands/µL   Basic metabolic panel    Collection Time: 04/23/22  6:48 AM   Result Value Ref Range    Sodium 143 136 - 145 mmol/L    Potassium 3 8 3 5 - 5 3 mmol/L    Chloride 108 100 - 108 mmol/L    CO2 24 21 - 32 mmol/L    ANION GAP 11 4 - 13 mmol/L    BUN 15 5 - 25 mg/dL    Creatinine 0 92 0 60 - 1 30 mg/dL    Glucose 118 65 - 140 mg/dL    Calcium 8 4 8 3 - 10 1 mg/dL    eGFR 73 ml/min/1 73sq m   Urine Macroscopic, POC    Collection Time: 04/23/22  6:57 AM   Result Value Ref Range    Color, UA Yellow     Clarity, UA Clear     pH, UA 7 0 4 5 - 8 0    Leukocytes, UA Negative Negative    Nitrite, UA Negative Negative    Protein, UA Negative Negative mg/dl    Glucose, UA Negative Negative mg/dl    Ketones, UA Negative Negative mg/dl    Urobilinogen, UA 0 2 0 2, 1 0 E U /dl E U /dl    Bilirubin, UA Negative Negative    Blood, UA Small (A) Negative    Specific Gravity, UA >=1 030 1 003 - 1 030   Urine Microscopic    Collection Time: 04/23/22  6:57 AM   Result Value Ref Range    RBC, UA 4-10 (A) None Seen, 2-4 /hpf    WBC, UA None Seen None Seen, 2-4, 5-60 /hpf    Epithelial Cells None Seen None Seen, Occasional /hpf    Bacteria, UA None Seen None Seen, Occasional /hpf            Physical Exam  Vitals and nursing note reviewed  Constitutional:       General: He is not in acute distress  Appearance: He is well-developed  He is not diaphoretic  HENT:      Head: Normocephalic and atraumatic  Eyes:      General: No scleral icterus  Right eye: No discharge  Left eye: No discharge  Conjunctiva/sclera: Conjunctivae normal       Pupils: Pupils are equal, round, and reactive to light  Neck:      Thyroid: No thyromegaly  Vascular: No JVD  Cardiovascular:      Rate and Rhythm: Normal rate and regular rhythm  Heart sounds: Normal heart sounds  No murmur heard  No friction rub  No gallop      Pulmonary:      Effort: Pulmonary effort is normal  No respiratory distress  Breath sounds: Normal breath sounds  No wheezing or rales  Chest:      Chest wall: No tenderness  Abdominal:      General: Bowel sounds are normal  There is no distension  Palpations: Abdomen is soft  There is no mass  Tenderness: There is no abdominal tenderness  There is no guarding or rebound  Genitourinary:     Comments: Mo catheter in place  Musculoskeletal:         General: No tenderness or deformity  Normal range of motion  Cervical back: Normal range of motion and neck supple  Lymphadenopathy:      Cervical: No cervical adenopathy  Skin:     General: Skin is warm and dry  Coloration: Skin is not pale  Findings: No erythema or rash  Neurological:      Mental Status: He is alert and oriented to person, place, and time  Cranial Nerves: No cranial nerve deficit  Coordination: Coordination normal       Deep Tendon Reflexes: Reflexes are normal and symmetric  Psychiatric:         Behavior: Behavior normal          Thought Content:  Thought content normal          Judgment: Judgment normal

## 2022-05-03 NOTE — ASSESSMENT & PLAN NOTE
Blood pressure on the softer side today  Discontinue amlodipine  Continue lisinopril 20 mg daily and metoprolol tartrate 50 mg twice a day

## 2022-05-04 ENCOUNTER — PROCEDURE VISIT (OUTPATIENT)
Dept: UROLOGY | Facility: AMBULATORY SURGERY CENTER | Age: 87
End: 2022-05-04
Payer: MEDICARE

## 2022-05-04 VITALS
HEART RATE: 60 BPM | BODY MASS INDEX: 22.68 KG/M2 | HEIGHT: 70 IN | WEIGHT: 158.4 LBS | SYSTOLIC BLOOD PRESSURE: 138 MMHG | DIASTOLIC BLOOD PRESSURE: 68 MMHG

## 2022-05-04 DIAGNOSIS — R33.8 ACUTE URINARY RETENTION: Primary | ICD-10-CM

## 2022-05-04 LAB — POST-VOID RESIDUAL VOLUME, ML POC: 141 ML

## 2022-05-04 PROCEDURE — 51798 US URINE CAPACITY MEASURE: CPT

## 2022-05-04 PROCEDURE — 99211 OFF/OP EST MAY X REQ PHY/QHP: CPT

## 2022-05-04 NOTE — PROGRESS NOTES
[address] [facility]  5/14/1932  [id number]    Diagnosis  Chief Complaint     Void Trial          Patient presents for Void trial managed by our office    Plan  Follow up as scheduled in August   Educational handout provided and reviewed of s/s he may experience after catheter removal    ER precautions reviewed  Advised to contact the office in the meantime with any concerns  Procedure Mo removal/voiding trial    Patient is AOx4 and slightly [place]  Patient states he has had no issues with current catheter  Patient states he is taking Finasteride & Flomax as prescribed  Mo catheter removed after deflation of an intact balloon  Patient tolerated well  Encouraged patient to hydrate well and return this afternoon for post void residual   he knows he may return early if uncomfortable and unable to urinate  Patient agrees to this plan  Patient returned this afternoon  Patient states able to void  Patient voided while in office  Bladder ultrasound performed and PVR measured 141 ml  Spoke to AP and advised of follow up in 3 months  Patient is agreeable       Recent Results (from the past 4 hour(s))   POCT Measure PVR    Collection Time: 05/04/22  2:37 PM   Result Value Ref Range    POST-VOID RESIDUAL VOLUME, ML  mL       Vitals:    05/04/22 0813   BP: 138/68   BP Location: Left arm   Patient Position: Sitting   Cuff Size: Adult   Pulse: 60   Weight: 71 8 kg (158 lb 6 4 oz)   Height: 5' 10" (1 778 m)           HANS SosaN, RN

## 2022-05-10 ENCOUNTER — TELEPHONE (OUTPATIENT)
Dept: UROLOGY | Facility: MEDICAL CENTER | Age: 87
End: 2022-05-10

## 2022-05-10 DIAGNOSIS — N50.82 SCROTAL PAIN: Primary | ICD-10-CM

## 2022-05-10 DIAGNOSIS — N39.0 URINARY TRACT INFECTION WITHOUT HEMATURIA, SITE UNSPECIFIED: ICD-10-CM

## 2022-05-10 NOTE — TELEPHONE ENCOUNTER
LM for Lorraine Courtneyond that it is recommend that he go for urine testing and scrotal ultrasound  He had  scheduled u/s at [phone] and go to [facility] for urine studies  Also explained that he ih has fever or swollen testes should report to ER

## 2022-05-10 NOTE — TELEPHONE ENCOUNTER
Patient calling back regarding previous encounter, stated that he missed Renees phone call and would like to speak with her prior to the end of the day

## 2022-05-10 NOTE — TELEPHONE ENCOUNTER
Patient seen by Leatha Barber in [place]    Patient called stating he had shin catheter removed on 05/04/22 and he is having soreness in groin area and he is feeling very uncomfortable  He wants to know if he needs to come in        Patient can be reached at [phone] (h

## 2022-05-10 NOTE — TELEPHONE ENCOUNTER
Last CT scan was performed prior to recent void trial   Recommend patient go for urine testing and scrotal ultrasound was ordered  Please review ER precautions and supportive measures

## 2022-05-10 NOTE — TELEPHONE ENCOUNTER
Returned call to patient   Patient states he is states discomfort in groin area  Right testicle is slightly swollen  Started with pain over the weekend  Reports no trama to area , has not done any heavy lifting  Urine is reported as clear yellow ,denies fevers or chills  No burning reported  Advised patient to monitor for fever, chill, or increase in swelling and contact our office  A message will be sent to provider for recommendations

## 2022-05-11 NOTE — TELEPHONE ENCOUNTER
Called and spoke with patient  Advised that urine testing was added to the chart and apologized for the inconvenience  He is going to go for it tomorrow

## 2022-05-11 NOTE — TELEPHONE ENCOUNTER
Moriah Brothers from [facility] [address] called stating patient is there for urine test and there is no order in the system for him to do   Please add order in epic    Patient is waiting    Any questions call Moriah Brothers at  [phone]

## 2022-05-12 ENCOUNTER — APPOINTMENT (OUTPATIENT)
Dept: LAB | Facility: IMAGING CENTER | Age: 87
End: 2022-05-12
Payer: MEDICARE

## 2022-05-12 ENCOUNTER — HOSPITAL ENCOUNTER (OUTPATIENT)
Dept: RADIOLOGY | Facility: IMAGING CENTER | Age: 87
Discharge: HOME | End: 2022-05-12
Payer: MEDICARE

## 2022-05-12 DIAGNOSIS — N50.82 SCROTAL PAIN: ICD-10-CM

## 2022-05-12 LAB
BACTERIA UR QL AUTO: ABNORMAL /HPF
BILIRUB UR QL STRIP: NEGATIVE
CLARITY UR: CLEAR
COLOR UR: YELLOW
GLUCOSE UR STRIP-MCNC: NEGATIVE MG/DL
HGB UR QL STRIP.AUTO: NEGATIVE
KETONES UR STRIP-MCNC: NEGATIVE MG/DL
LEUKOCYTE ESTERASE UR QL STRIP: ABNORMAL
MUCOUS THREADS UR QL AUTO: ABNORMAL
NITRITE UR QL STRIP: NEGATIVE
NON-SQ EPI CELLS URNS QL MICRO: ABNORMAL /HPF
PH UR STRIP.AUTO: 5.5 [PH]
PROT UR STRIP-MCNC: ABNORMAL MG/DL
RBC #/AREA URNS AUTO: ABNORMAL /HPF
SP GR UR STRIP.AUTO: 1.03 (ref 1–1.03)
UROBILINOGEN UR STRIP-ACNC: <2 MG/DL
WBC #/AREA URNS AUTO: ABNORMAL /HPF

## 2022-05-12 PROCEDURE — 87086 URINE CULTURE/COLONY COUNT: CPT

## 2022-05-12 PROCEDURE — 87186 SC STD MICRODIL/AGAR DIL: CPT

## 2022-05-12 PROCEDURE — 76870 US EXAM SCROTUM: CPT

## 2022-05-12 PROCEDURE — 81001 URINALYSIS AUTO W/SCOPE: CPT

## 2022-05-12 PROCEDURE — 87077 CULTURE AEROBIC IDENTIFY: CPT

## 2022-05-13 NOTE — TELEPHONE ENCOUNTER
Results of urinalysis appear negative for infection, final culture results remain pending  Scrotal ultrasound revealed no signs of acute infection  Patient was noted to have small bilateral hydroceles as well as epididymal head cysts  These are benign findings  Would recommend supportive measures  Will inform patient if there are any significant findings noted on urine culture

## 2022-05-13 NOTE — TELEPHONE ENCOUNTER
Spoke with patient  Advised on AP's note  Reviewed supportive measures and informed we will call with results of the urine culture once they come back  Patient is agreeable with the plan

## 2022-05-14 LAB — BACTERIA UR CULT: ABNORMAL

## 2022-05-15 ENCOUNTER — HOSPITAL ENCOUNTER (EMERGENCY)
Facility: HOSPITAL | Age: 87
Discharge: HOME | End: 2022-05-15
Attending: EMERGENCY MEDICINE
Payer: MEDICARE

## 2022-05-15 ENCOUNTER — APPOINTMENT (EMERGENCY)
Dept: CT IMAGING | Facility: HOSPITAL | Age: 87
End: 2022-05-15
Payer: MEDICARE

## 2022-05-15 ENCOUNTER — APPOINTMENT (EMERGENCY)
Dept: RADIOLOGY | Facility: HOSPITAL | Age: 87
End: 2022-05-15
Payer: MEDICARE

## 2022-05-15 VITALS
HEART RATE: 59 BPM | OXYGEN SATURATION: 94 % | TEMPERATURE: 98.2 F | RESPIRATION RATE: 16 BRPM | DIASTOLIC BLOOD PRESSURE: 92 MMHG | SYSTOLIC BLOOD PRESSURE: 197 MMHG

## 2022-05-15 DIAGNOSIS — S80.212A ABRASION OF KNEE, BILATERAL: ICD-10-CM

## 2022-05-15 DIAGNOSIS — S80.211A ABRASION OF KNEE, BILATERAL: ICD-10-CM

## 2022-05-15 DIAGNOSIS — S00.31XA ABRASION OF NOSE, INITIAL ENCOUNTER: ICD-10-CM

## 2022-05-15 DIAGNOSIS — S61.412A SKIN TEAR OF LEFT HAND WITHOUT COMPLICATION, INITIAL ENCOUNTER: ICD-10-CM

## 2022-05-15 DIAGNOSIS — S09.90XA INJURY OF HEAD, INITIAL ENCOUNTER: ICD-10-CM
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
## 2022-05-15 PROCEDURE — 70450 CT HEAD/BRAIN W/O DYE: CPT

## 2022-05-15 PROCEDURE — G1004 CDSM NDSC: HCPCS

## 2022-05-15 PROCEDURE — 73130 X-RAY EXAM OF HAND: CPT

## 2022-05-15 PROCEDURE — 72125 CT NECK SPINE W/O DYE: CPT

## 2022-05-15 PROCEDURE — 99284 EMERGENCY DEPT VISIT MOD MDM: CPT | Performed by: EMERGENCY MEDICINE

## 2022-05-15 PROCEDURE — 99284 EMERGENCY DEPT VISIT MOD MDM: CPT

## 2022-05-15 RX ORDER — LEVOFLOXACIN 500 MG/1
500 TABLET, FILM COATED ORAL EVERY 24 HOURS
Qty: 7 TABLET | Refills: 0 | Status: SHIPPED | OUTPATIENT
Start: 2022-05-15 | End: 2022-05-22

## 2022-05-15 NOTE — ED PROVIDER NOTES
History  Chief Complaint   Patient presents with    Fall     Pt tripped and hit face, has nose swelling, abrasion to left hand and bilateral knees  No LOC  No thinners  A 49-year-old male with past medical history of CAD, colon cancer s/p resection, hyperlipidemia and hypertension; presents after a fall  Patient was walking up steps, when he accidentally kicked the step and fell forward  Patient denies falling down any steps  Patient does not recall striking his head, however does have an abrasion over his nose  He denies loss of consciousness  Patient also sustained a skin tear to the left hand and bilateral knees  Currently patient offers no complaints, he denies any pain  Patient states he was able to ambulate after the fall  Patient denies headache, dizziness, lightheadedness, neck pain, back pain, chest pain, shortness of breath, abdominal pain, nausea, vomiting, diarrhea, peripheral edema, rashes, paresthesias and focal weakness  A/P:  Mechanical fall, now with abrasions to the nasal bridge, left hand and bilateral knees  Patient otherwise resting comfortably, neurologically intact  Will obtain imaging to rule out underlying injury  Will provide local wound care  Patient is up-to-date on his tetanus  History provided by:  Patient and medical records      Prior to Admission Medications   Prescriptions Last Dose Informant Patient Reported? Taking?    Multiple Vitamins-Minerals (MULTIVITAL-M PO)  Self Yes No   Sig: Take 1 tablet by mouth   aspirin 81 mg chewable tablet  Self Yes No   Sig: Chew 81 mg   finasteride (PROSCAR) 5 mg tablet  Self No No   Sig: Take 1 tablet (5 mg total) by mouth daily   folic acid (FOLVITE) 658 MCG tablet  Self Yes No   Sig: Take 400 mcg by mouth daily   lisinopril (ZESTRIL) 20 mg tablet  Self No No   Sig: Take 1 tablet (20 mg total) by mouth daily   metoprolol tartrate (LOPRESSOR) 50 mg tablet  Self No No   Sig: Take 1 tablet (50 mg total) by mouth every 12 (twelve) hours   pantoprazole (PROTONIX) 40 mg tablet  Self No No   Sig: Take 1 tablet (40 mg total) by mouth daily   Patient taking differently: Take 40 mg by mouth if needed     simvastatin (ZOCOR) 20 mg tablet  Self No No   Sig: Take 1 tablet (20 mg total) by mouth daily at bedtime   tamsulosin (FLOMAX) 0 4 mg  Self No No   Sig: Take 1 capsule (0 4 mg total) by mouth daily at bedtime      Facility-Administered Medications Last Administration Doses Remaining   aspirin chewable tablet 324 mg 11/5/2021  9:06 AM           Past Medical History:   Diagnosis Date    Anemia, iron deficiency     Drug Therapy, poor absorbtion documented    Appendicitis     Arthritis 1/25/2019    Cardiac disease     Colon cancer ([facility] [address] ) 12/2013    Resected: BREANNA    Congenital nystagmus     Left    Hyperlipidemia     Hypertension     Inguinal hernia     Sciatica 2010    resolved from problem list-2011       Past Surgical History:   Procedure Laterality Date    APPENDECTOMY      Appendicitis    CHOLECYSTECTOMY      COLON SIGMOID RESECTION  12/2013    COLON SURGERY      CORONARY ARTERY BYPASS GRAFT      x 2    CORONARY ARTERY BYPASS GRAFT      x2    CORONARY ARTERY BYPASS GRAFT      CORONARY ARTERY BYPASS GRAFT      INGUINAL HERNIA REPAIR      SHOULDER ARTHROCENTESIS Right     sub-acromial bursa area       Family History   Problem Relation Age of Onset    Hypertension Father     Heart disease Father     Heart disease Mother     Cancer Sister     Cancer Brother      I have reviewed and agree with the history as documented      E-Cigarette/Vaping    E-Cigarette Use Never User      E-Cigarette/Vaping Substances    Nicotine No     THC No     CBD No     Flavoring No     Other No     Unknown No      Social History     Tobacco Use    Smoking status: Never Smoker    Smokeless tobacco: Never Used   Vaping Use    Vaping Use: Never used   Substance Use Topics    Alcohol use: No    Drug use: No       Review of Systems Skin: Positive for wound  All other systems reviewed and are negative  Physical Exam  Physical Exam  General Appearance: alert and oriented, nad, non toxic appearing  Skin:  Warm, dry  HEENT: Normocephalic  Abrasion over nasal bridge with associated swelling  Remainder of facial bones nontender without swelling  No septal hematoma  Teeth intact  PERRLA, EOMI  Neck: Supple, trachea midline  No midline spinal tenderness  Cardiac: RRR; no murmurs, rub, gallops  Pulmonary: lungs CTAB; no wheezes, rales, rhonchi  Gastrointestinal: abdomen soft, nontender, nondistended; no guarding or rebound tenderness; good bowel sounds, no mass or bruits  Extremities:  No midline spinal tenderness  Skin tear noted to dorsal hand without active bleeding, extending over the 4-5th MCP joints  Mild tenderness and ecchymosis over the 4-5th metacarpals and MCP joints  Remainder of LUE and RUE nontender with full ROM  Abrasions to bilateral knees  Knees nontender with full ROM  Remainder of bilateral lower extremities nontender with full ROM    No pedal edema, 2+ pulses; no calf tenderness, no clubbing, no cyanosis  Neuro:  no focal motor or sensory deficits, CN 2-12 grossly intact  Psych:  Normal mood and affect, normal judgement and insight      Vital Signs  ED Triage Vitals   Temperature Pulse Respirations Blood Pressure SpO2   05/15/22 1353 05/15/22 1353 05/15/22 1352 05/15/22 1353 05/15/22 1352   98 2 °F (36 8 °C) 59 16 (!) 197/92 94 %      Temp Source Heart Rate Source Patient Position - Orthostatic VS BP Location FiO2 (%)   05/15/22 1353 05/15/22 1352 -- -- --   Oral Monitor         Pain Score       --                  Vitals:    05/15/22 1353   BP: (!) 197/92   Pulse: 59         Visual Acuity      ED Medications  Medications - No data to display    Diagnostic Studies  Results Reviewed     None                 XR hand 3+ views LEFT   ED Interpretation by Danii (05/15 1505)   No acute fracture or dislocation      CT head without contrast   Final Result by Almaz Liz MD (05/15 1453)      No acute intracranial abnormality  Workstation performed: [id number]         CT cervical spine without contrast   Final Result by Almaz Liz MD (05/15 1456)      No acute fracture  Cervical spondylosis as above  Workstation performed: [id number]                    Procedures  Procedures         ED Course  ED Course as of 05/15/22 2213   Sun May 15, 2022   1504 CT cervical spine without contrast  No acute fracture  Cervical spondylosis as above  1504 CT head without contrast  No acute intracranial abnormality  1515 Skin tear to left hand dressed with wet to dry dressing  Wound care discussed with patient and his son  Updated on imaging results  Son will take patient home  Will proceed with discharge home  1520 Pt able to ambulate without assistance  MDM    Disposition  Final diagnoses:   Fall, initial encounter   Injury of head, initial encounter   Abrasion of nose, initial encounter   Skin tear of left hand without complication, initial encounter   Abrasion of knee, bilateral     Time reflects when diagnosis was documented in both MDM as applicable and the Disposition within this note     Time User Action Codes Description Comment    5/15/2022  3:19 PM Alphonse Quezada [W19  [id number]] Fall, initial encounter     5/15/2022  3:19 PM Megan, [address] [[id number]] Injury of head, initial encounter     5/15/2022  3:19 PM Megan, [address] Abrasion of nose, initial encounter     5/15/2022  3:19 PM Alphonse Quezada [[id number]] Skin tear of left hand without complication, initial encounter     5/15/2022  3:19 PM Megan, [address] Abrasion of knee, bilateral       ED Disposition     ED Disposition   Discharge    Condition   Stable    Date/Time   Sun May 15, 2022  3:19 PM    Comment   Candy Lira discharge to home/self care  Follow-up Information     Follow up With Specialties Details Why Contact Info    Doc MD [facility] Schedule an appointment as soon as possible for a visit in 3 days For re-evaluation [address] [place] [address]  [phone]            Discharge Medication List as of 5/15/2022  3:20 PM      CONTINUE these medications which have NOT CHANGED    Details   aspirin 81 mg chewable tablet Chew 81 mg, Historical Med      finasteride (PROSCAR) 5 mg tablet Take 1 tablet (5 mg total) by mouth daily, Starting Wed 1/94/0317, Normal      folic acid (FOLVITE) 375 MCG tablet Take 400 mcg by mouth daily, Historical Med      lisinopril (ZESTRIL) 20 mg tablet Take 1 tablet (20 mg total) by mouth daily, Starting Sun 11/14/2021, Normal      metoprolol tartrate (LOPRESSOR) 50 mg tablet Take 1 tablet (50 mg total) by mouth every 12 (twelve) hours, Starting Wed 6/23/2021, Normal      Multiple Vitamins-Minerals (MULTIVITAL-M PO) Take 1 tablet by mouth, Historical Med      pantoprazole (PROTONIX) 40 mg tablet Take 1 tablet (40 mg total) by mouth daily, Starting Thu 11/18/2021, Normal      simvastatin (ZOCOR) 20 mg tablet Take 1 tablet (20 mg total) by mouth daily at bedtime, Starting Thu 2/3/2022, Normal      tamsulosin (FLOMAX) 0 4 mg Take 1 capsule (0 4 mg total) by mouth daily at bedtime, Starting Wed 4/27/2022, Normal             No discharge procedures on file      PDMP Review     None          ED Provider  Electronically Signed by           Dariusz Michele DO  05/15/22 5276

## 2022-05-15 NOTE — DISCHARGE INSTRUCTIONS
Keep wounds clean and dry, you can use regular soap and water to clean them  Leave dressing on left hand until tomorrow, then you can remove it  The skin tear will likely take several weeks to heal, this is normal   Return to the ED if you develop redness or drainage from the wound

## 2022-05-16 NOTE — TELEPHONE ENCOUNTER
Called and spoke with patient  Advised on AP's note and that Ant was called into his [address] [place] [address]  Advised to complete the entire course of antibiotics

## 2022-06-23 ENCOUNTER — RA CDI HCC (OUTPATIENT)
Dept: OTHER | Facility: HOSPITAL | Age: 87
End: 2022-06-23

## 2022-06-23 NOTE — PROGRESS NOTES
[facility] [address]  coding opportunities       Chart reviewed, no opportunity found: CHART REVIEWED, NO OPPORTUNITY FOUND        Patients Insurance     Medicare Insurance: Medicare

## 2022-06-29 ENCOUNTER — OFFICE VISIT (OUTPATIENT)
Dept: INTERNAL MEDICINE CLINIC | Facility: OTHER | Age: 87
End: 2022-06-29
Payer: MEDICARE

## 2022-06-29 VITALS
DIASTOLIC BLOOD PRESSURE: 92 MMHG | HEART RATE: 51 BPM | RESPIRATION RATE: 18 BRPM | SYSTOLIC BLOOD PRESSURE: 170 MMHG | HEIGHT: 70 IN | WEIGHT: 159.4 LBS | TEMPERATURE: 98.4 F | BODY MASS INDEX: 22.82 KG/M2 | OXYGEN SATURATION: 98 %

## 2022-06-29 DIAGNOSIS — R19.7 DIARRHEA OF PRESUMED INFECTIOUS ORIGIN: ICD-10-CM

## 2022-06-29 DIAGNOSIS — M79.89 SWELLING OF LEFT FOOT: ICD-10-CM

## 2022-06-29 DIAGNOSIS — G44.209 ACUTE NON INTRACTABLE TENSION-TYPE HEADACHE: ICD-10-CM

## 2022-06-29 DIAGNOSIS — I10 ESSENTIAL HYPERTENSION: Primary | ICD-10-CM

## 2022-06-29 PROCEDURE — 99214 OFFICE O/P EST MOD 30 MIN: CPT | Performed by: INTERNAL MEDICINE

## 2022-06-29 RX ORDER — HYDROCHLOROTHIAZIDE 12.5 MG/1
12.5 TABLET ORAL DAILY
Qty: 30 TABLET | Refills: 0 | Status: SHIPPED | OUTPATIENT
Start: 2022-06-29 | End: 2022-08-08 | Stop reason: SDUPTHER

## 2022-06-29 RX ORDER — METOPROLOL TARTRATE 100 MG/1
100 TABLET ORAL EVERY 12 HOURS SCHEDULED
COMMUNITY
Start: 2022-06-11 | End: 2022-07-11 | Stop reason: SDUPTHER

## 2022-06-29 NOTE — ASSESSMENT & PLAN NOTE
Uncontrolled  Will start hydrochlorothiazide 12 5 mg daily  Continue lisinopril 20 mg daily and metoprolol tartrate 100 mg twice a day  Will check BMP in 3 weeks  Follow-up in 3 weeks

## 2022-06-29 NOTE — PROGRESS NOTES
Assessment/Plan:    Essential hypertension  Uncontrolled  Will start hydrochlorothiazide 12 5 mg daily  Continue lisinopril 20 mg daily and metoprolol tartrate 100 mg twice a day  Will check BMP in 3 weeks  Follow-up in 3 weeks  Acute tension headache  Likely due to uncontrolled blood pressure  Discussed taking Tylenol as needed  Will follow-up in 3 weeks  Diarrhea of presumed infectious origin  Will send for C diff testing given recent exposure  Swelling of left foot  Starting hydrochlorothiazide for blood pressure should improve left foot swelling  Continue use of compression stockings  Diagnoses and all orders for this visit:    Essential hypertension  -     hydrochlorothiazide (HYDRODIURIL) 12 5 mg tablet; Take 1 tablet (12 5 mg total) by mouth daily  -     Basic metabolic panel; Future    Diarrhea of presumed infectious origin  -     Clostridium difficile toxin by PCR; Future    Acute non intractable tension-type headache    Swelling of left foot    Other orders  -     metoprolol tartrate (LOPRESSOR) 100 mg tablet; Take 100 mg by mouth every 12 (twelve) hours                Subjective:      Patient ID: Vinod Herbert is a 80 y o  male  Chief Complaint   Patient presents with    Headache     Reoccurring of and on but has one pretty much every day  Noticed BP has been more elevated again      hm     phq    Foot Swelling     Left foot from ankle down noticed it for past month    Stress     Wife is in [facility] and she has been in and out of the hospital he goes to see her everyday, she has c-diff he wants to know how to protect his self  80year old male is seen today with multiple concerns  No labs to review today  Has been under a lot of stress as his wife is currently in rehabilitation following hospitalization for right toe amputation and C  Diff colitis  He currently resides at [facility]  He had loose stools this morning   He sees his wife daily and admits to not wearing any PPE and doesn't wash his hands thoroughly after visiting her  He has been experiencing tension headaches  His blood pressure has been elevated  Norvasc was discontinued after last visit due to low BP readings  He has been compliant with metoprolol 100 mg twice a day and lisinopril 20 mg daily  He has noticed left foot swelling since 6 weeks  He denies any recent trauma  He wears compression stocking without improvement of symptoms  Headache      The following portions of the patient's history were reviewed and updated as appropriate: allergies, current medications, past family history, past medical history, past social history, past surgical history and problem list     Review of Systems   Constitutional: Negative for activity change, appetite change, chills, diaphoresis, fatigue and fever  HENT: Negative for congestion, postnasal drip, rhinorrhea, sinus pressure, sinus pain, sneezing and sore throat  Eyes: Negative for visual disturbance  Respiratory: Negative for apnea, cough, choking, chest tightness, shortness of breath and wheezing  Cardiovascular: Negative for chest pain, palpitations and leg swelling  Gastrointestinal: Negative for abdominal distention, abdominal pain, anal bleeding, blood in stool, constipation, diarrhea, nausea and vomiting  Endocrine: Negative for cold intolerance and heat intolerance  Genitourinary: Negative for difficulty urinating, dysuria and hematuria  Musculoskeletal: Negative  Skin: Negative  Neurological: Negative for dizziness, weakness, light-headedness, numbness and headaches  Hematological: Negative for adenopathy  Psychiatric/Behavioral: Negative for agitation, sleep disturbance and suicidal ideas  All other systems reviewed and are negative          Past Medical History:   Diagnosis Date    Anemia, iron deficiency     Drug Therapy, poor absorbtion documented    Appendicitis     Arthritis 1/25/2019  Cardiac disease     Colon cancer ([facility] [address] ) 12/2013    Resected: BREANNA    Congenital nystagmus     Left    Hyperlipidemia     Hypertension     Inguinal hernia     Sciatica 2010    resolved from problem list-2011         Current Outpatient Medications:     aspirin 81 mg chewable tablet, Chew 81 mg, Disp: , Rfl:     finasteride (PROSCAR) 5 mg tablet, Take 1 tablet (5 mg total) by mouth daily, Disp: 90 tablet, Rfl: 3    folic acid (FOLVITE) 983 MCG tablet, Take 400 mcg by mouth daily, Disp: , Rfl:     hydrochlorothiazide (HYDRODIURIL) 12 5 mg tablet, Take 1 tablet (12 5 mg total) by mouth daily, Disp: 30 tablet, Rfl: 0    lisinopril (ZESTRIL) 20 mg tablet, Take 1 tablet (20 mg total) by mouth daily, Disp: 90 tablet, Rfl: 3    metoprolol tartrate (LOPRESSOR) 100 mg tablet, Take 100 mg by mouth every 12 (twelve) hours, Disp: , Rfl:     Multiple Vitamins-Minerals (MULTIVITAL-M PO), Take 1 tablet by mouth, Disp: , Rfl:     pantoprazole (PROTONIX) 40 mg tablet, Take 1 tablet (40 mg total) by mouth daily (Patient taking differently: Take 40 mg by mouth if needed), Disp: 90 tablet, Rfl: 6    simvastatin (ZOCOR) 20 mg tablet, Take 1 tablet (20 mg total) by mouth daily at bedtime, Disp: 90 tablet, Rfl: 3    tamsulosin (FLOMAX) 0 4 mg, Take 1 capsule (0 4 mg total) by mouth daily at bedtime, Disp: 90 capsule, Rfl: 3    Current Facility-Administered Medications:     aspirin chewable tablet 324 mg, 324 mg, Oral, Daily, Becca East PA-C, 324 mg at 11/05/21 8737    No Known Allergies    Social History   Past Surgical History:   Procedure Laterality Date    APPENDECTOMY      Appendicitis    CHOLECYSTECTOMY      COLON SIGMOID RESECTION  12/2013    COLON SURGERY      CORONARY ARTERY BYPASS GRAFT      x 2    CORONARY ARTERY BYPASS GRAFT      x2    CORONARY ARTERY BYPASS GRAFT      CORONARY ARTERY BYPASS GRAFT      INGUINAL HERNIA REPAIR      SHOULDER ARTHROCENTESIS Right     sub-acromial bursa area Family History   Problem Relation Age of Onset    Hypertension Father     Heart disease Father     Heart disease Mother     Cancer Sister     Cancer Brother        Objective:  /92 (BP Location: Left arm, Patient Position: Sitting, Cuff Size: Standard)   Pulse (!) 51   Temp 98 4 °F (36 9 °C) (Temporal)   Resp 18   Ht 5' 10" (1 778 m)   Wt 72 3 kg (159 lb 6 4 oz)   SpO2 98%   BMI 22 87 kg/m²     Recent Results (from the past 1344 hour(s))   POCT Measure PVR    Collection Time: 05/04/22  2:37 PM   Result Value Ref Range    POST-VOID RESIDUAL VOLUME, ML  mL   Urinalysis with microscopic    Collection Time: 05/12/22 10:57 AM   Result Value Ref Range    Color, UA Yellow     Clarity, UA Clear     Specific Gravity, UA 1 028 1 003 - 1 030    pH, UA 5 5 4 5, 5 0, 5 5, 6 0, 6 5, 7 0, 7 5, 8 0    Leukocytes, UA Small (A) Negative    Nitrite, UA Negative Negative    Protein, UA 30 (1+) (A) Negative mg/dl    Glucose, UA Negative Negative mg/dl    Ketones, UA Negative Negative mg/dl    Urobilinogen, UA <2 0 <2 0 mg/dl mg/dl    Bilirubin, UA Negative Negative    Occult Blood, UA Negative Negative    RBC, UA 1-2 None Seen, 1-2 /hpf    WBC, UA 4-10 (A) None Seen, 1-2 /hpf    Epithelial Cells None Seen None Seen, Occasional /hpf    Bacteria, UA None Seen None Seen, Occasional /hpf    MUCUS THREADS Moderate (A) None Seen   Urine culture    Collection Time: 05/12/22 10:57 AM    Specimen: Urine, Clean Catch   Result Value Ref Range    Urine Culture 20,000-29,000 cfu/ml Enterococcus faecalis (A)        Susceptibility    Enterococcus faecalis - SHALINI     ZID Performed Yes       Ampicillin ($$) <=2 00 Susceptible ug/ml     Levofloxacin ($) 1 00 Susceptible ug/ml     Nitrofurantoin <=32 Susceptible ug/ml     Tetracycline <=2 Susceptible ug/ml     Vancomycin ($) 1 00 Susceptible ug/ml            Physical Exam  Vitals and nursing note reviewed  Constitutional:       General: He is not in acute distress  Appearance: He is well-developed  He is not diaphoretic  HENT:      Head: Normocephalic and atraumatic  Eyes:      General: No scleral icterus  Right eye: No discharge  Left eye: No discharge  Conjunctiva/sclera: Conjunctivae normal       Pupils: Pupils are equal, round, and reactive to light  Neck:      Thyroid: No thyromegaly  Vascular: No JVD  Cardiovascular:      Rate and Rhythm: Normal rate and regular rhythm  Heart sounds: Normal heart sounds  No murmur heard  No friction rub  No gallop  Pulmonary:      Effort: Pulmonary effort is normal  No respiratory distress  Breath sounds: Normal breath sounds  No wheezing or rales  Chest:      Chest wall: No tenderness  Abdominal:      General: Bowel sounds are normal  There is no distension  Palpations: Abdomen is soft  There is no mass  Tenderness: There is no abdominal tenderness  There is no guarding or rebound  Musculoskeletal:         General: No tenderness or deformity  Normal range of motion  Cervical back: Normal range of motion and neck supple  Feet:    Lymphadenopathy:      Cervical: No cervical adenopathy  Skin:     General: Skin is warm and dry  Coloration: Skin is not pale  Findings: No erythema or rash  Neurological:      Mental Status: He is alert and oriented to person, place, and time  Cranial Nerves: No cranial nerve deficit  Coordination: Coordination normal       Deep Tendon Reflexes: Reflexes are normal and symmetric  Psychiatric:         Behavior: Behavior normal          Thought Content:  Thought content normal          Judgment: Judgment normal

## 2022-06-29 NOTE — ASSESSMENT & PLAN NOTE
Likely due to uncontrolled blood pressure  Discussed taking Tylenol as needed  Will follow-up in 3 weeks

## 2022-06-29 NOTE — ASSESSMENT & PLAN NOTE
Starting hydrochlorothiazide for blood pressure should improve left foot swelling  Continue use of compression stockings

## 2022-07-11 ENCOUNTER — APPOINTMENT (OUTPATIENT)
Dept: LAB | Facility: IMAGING CENTER | Age: 87
End: 2022-07-11
Payer: MEDICARE

## 2022-07-11 DIAGNOSIS — R19.7 DIARRHEA OF PRESUMED INFECTIOUS ORIGIN: ICD-10-CM

## 2022-07-11 DIAGNOSIS — I10 ESSENTIAL HYPERTENSION: Primary | ICD-10-CM

## 2022-07-11 PROCEDURE — 87493 C DIFF AMPLIFIED PROBE: CPT

## 2022-07-11 RX ORDER — METOPROLOL TARTRATE 100 MG/1
100 TABLET ORAL EVERY 12 HOURS SCHEDULED
Qty: 180 TABLET | Refills: 1 | Status: SHIPPED | OUTPATIENT
Start: 2022-07-11

## 2022-07-12 LAB — C DIFF TOX GENS STL QL NAA+PROBE: NEGATIVE

## 2022-07-19 ENCOUNTER — OFFICE VISIT (OUTPATIENT)
Dept: INTERNAL MEDICINE CLINIC | Facility: OTHER | Age: 87
End: 2022-07-19
Payer: MEDICARE

## 2022-07-19 VITALS
TEMPERATURE: 97.7 F | OXYGEN SATURATION: 99 % | RESPIRATION RATE: 18 BRPM | DIASTOLIC BLOOD PRESSURE: 78 MMHG | SYSTOLIC BLOOD PRESSURE: 122 MMHG | HEART RATE: 60 BPM | HEIGHT: 70 IN | WEIGHT: 157.2 LBS | BODY MASS INDEX: 22.5 KG/M2

## 2022-07-19 DIAGNOSIS — G44.209 ACUTE NON INTRACTABLE TENSION-TYPE HEADACHE: ICD-10-CM

## 2022-07-19 DIAGNOSIS — E78.5 DYSLIPIDEMIA: ICD-10-CM

## 2022-07-19 DIAGNOSIS — M79.89 SWELLING OF LEFT FOOT: ICD-10-CM

## 2022-07-19 DIAGNOSIS — I10 ESSENTIAL HYPERTENSION: Primary | ICD-10-CM

## 2022-07-19 PROCEDURE — 99214 OFFICE O/P EST MOD 30 MIN: CPT | Performed by: INTERNAL MEDICINE

## 2022-07-19 NOTE — PROGRESS NOTES
Assessment/Plan:    Essential hypertension  Improved, continue lisinopril 20 mg daily, metoprolol tartrate 100 mg BID, and HCTZ 12 5 mg daily  Swelling of left foot  Improved  Continue hydrochlorothiazide 12 5 mg daily  Dyslipidemia  Continue simvastatin 20 mg daily  Acute tension headache  Resolved  Continue to monitor clinically       Diagnoses and all orders for this visit:    Essential hypertension    Swelling of left foot    Dyslipidemia    Acute non intractable tension-type headache                Subjective:      Patient ID: Laverne Duffy is a 80 y o  male  Chief Complaint   Patient presents with    Follow-up     3 week, HTN    wife was bury yesterday 76 yrs   Feeling good swelling down in his lower extremities, headaches better    hm     AWV due after 12/21/22       80year old male is seen today for follow up of hypertension, lower extremity edema, and headache  Unfortunately, his wife passed away  He has been grieving, overall doing well  He has been compliant with metoprolol tartrate, lisinopril, and hydrochlorothiazide  He reports improvement in his blood pressure and lower extremity edema  His headaches have also resolved with better control of his blood pressure  Otherwise, he has no active complaints or concerns at this time  Hypertension  This is a chronic problem  The current episode started more than 1 year ago  The problem is unchanged  The problem is controlled  Pertinent negatives include no chest pain, headaches, palpitations or shortness of breath  Past treatments include ACE inhibitors, beta blockers and diuretics  The current treatment provides moderate improvement  There are no compliance problems          The following portions of the patient's history were reviewed and updated as appropriate: allergies, current medications, past family history, past medical history, past social history, past surgical history and problem list     Review of Systems   Constitutional: Negative for activity change, appetite change, chills, diaphoresis, fatigue and fever  HENT: Negative for congestion, postnasal drip, rhinorrhea, sinus pressure, sinus pain, sneezing and sore throat  Eyes: Negative for visual disturbance  Respiratory: Negative for apnea, cough, choking, chest tightness, shortness of breath and wheezing  Cardiovascular: Negative for chest pain, palpitations and leg swelling  Gastrointestinal: Negative for abdominal distention, abdominal pain, anal bleeding, blood in stool, constipation, diarrhea, nausea and vomiting  Endocrine: Negative for cold intolerance and heat intolerance  Genitourinary: Negative for difficulty urinating, dysuria and hematuria  Musculoskeletal: Negative  Skin: Negative  Neurological: Negative for dizziness, weakness, light-headedness, numbness and headaches  Hematological: Negative for adenopathy  Psychiatric/Behavioral: Negative for agitation, sleep disturbance and suicidal ideas  All other systems reviewed and are negative          Past Medical History:   Diagnosis Date    Anemia, iron deficiency     Drug Therapy, poor absorbtion documented    Appendicitis     Arthritis 1/25/2019    Cardiac disease     Colon cancer ([facility] [address] ) 12/2013    Resected: BREANNA    Congenital nystagmus     Left    Hyperlipidemia     Hypertension     Inguinal hernia     Sciatica 2010    resolved from problem list-2011         Current Outpatient Medications:     aspirin 81 mg chewable tablet, Chew 81 mg, Disp: , Rfl:     finasteride (PROSCAR) 5 mg tablet, Take 1 tablet (5 mg total) by mouth daily, Disp: 90 tablet, Rfl: 3    folic acid (FOLVITE) 513 MCG tablet, Take 400 mcg by mouth daily, Disp: , Rfl:     hydrochlorothiazide (HYDRODIURIL) 12 5 mg tablet, Take 1 tablet (12 5 mg total) by mouth daily, Disp: 30 tablet, Rfl: 0    lisinopril (ZESTRIL) 20 mg tablet, Take 1 tablet (20 mg total) by mouth daily, Disp: 90 tablet, Rfl: 3    metoprolol tartrate (LOPRESSOR) 100 mg tablet, Take 1 tablet (100 mg total) by mouth every 12 (twelve) hours, Disp: 180 tablet, Rfl: 1    Multiple Vitamins-Minerals (MULTIVITAL-M PO), Take 1 tablet by mouth, Disp: , Rfl:     pantoprazole (PROTONIX) 40 mg tablet, Take 1 tablet (40 mg total) by mouth daily (Patient taking differently: Take 40 mg by mouth if needed), Disp: 90 tablet, Rfl: 6    simvastatin (ZOCOR) 20 mg tablet, Take 1 tablet (20 mg total) by mouth daily at bedtime, Disp: 90 tablet, Rfl: 3    tamsulosin (FLOMAX) 0 4 mg, Take 1 capsule (0 4 mg total) by mouth daily at bedtime, Disp: 90 capsule, Rfl: 3    Current Facility-Administered Medications:     aspirin chewable tablet 324 mg, 324 mg, Oral, Daily, Becca East PA-C, 324 mg at 11/05/21 0906    No Known Allergies    Social History   Past Surgical History:   Procedure Laterality Date    APPENDECTOMY      Appendicitis    CHOLECYSTECTOMY      COLON SIGMOID RESECTION  12/2013    COLON SURGERY      CORONARY ARTERY BYPASS GRAFT      x 2    CORONARY ARTERY BYPASS GRAFT      x2    CORONARY ARTERY BYPASS GRAFT      CORONARY ARTERY BYPASS GRAFT      INGUINAL HERNIA REPAIR      SHOULDER ARTHROCENTESIS Right     sub-acromial bursa area     Family History   Problem Relation Age of Onset    Hypertension Father     Heart disease Father     Heart disease Mother     Cancer Sister     Cancer Brother        Objective:  /78 (BP Location: Left arm, Patient Position: Sitting, Cuff Size: Standard)   Pulse 60   Temp 97 7 °F (36 5 °C) (Temporal)   Resp 18   Ht 5' 10" (1 778 m)   Wt 71 3 kg (157 lb 3 2 oz)   SpO2 99%   BMI 22 56 kg/m²     Recent Results (from the past 1344 hour(s))   Clostridium difficile toxin by PCR    Collection Time: 07/11/22  9:04 AM    Specimen: Per Rectum; Stool   Result Value Ref Range    C difficile toxin by PCR Negative Negative            Physical Exam  Vitals and nursing note reviewed     Constitutional: General: He is not in acute distress  Appearance: He is well-developed  He is not diaphoretic  HENT:      Head: Normocephalic and atraumatic  Eyes:      General: No scleral icterus  Right eye: No discharge  Left eye: No discharge  Conjunctiva/sclera: Conjunctivae normal       Pupils: Pupils are equal, round, and reactive to light  Neck:      Thyroid: No thyromegaly  Vascular: No JVD  Cardiovascular:      Rate and Rhythm: Normal rate and regular rhythm  Heart sounds: Normal heart sounds  No murmur heard  No friction rub  No gallop  Pulmonary:      Effort: Pulmonary effort is normal  No respiratory distress  Breath sounds: Normal breath sounds  No wheezing or rales  Chest:      Chest wall: No tenderness  Abdominal:      General: Bowel sounds are normal  There is no distension  Palpations: Abdomen is soft  There is no mass  Tenderness: There is no abdominal tenderness  There is no guarding or rebound  Musculoskeletal:         General: No tenderness or deformity  Normal range of motion  Cervical back: Normal range of motion and neck supple  Lymphadenopathy:      Cervical: No cervical adenopathy  Skin:     General: Skin is warm and dry  Coloration: Skin is not pale  Findings: No erythema or rash  Neurological:      Mental Status: He is alert and oriented to person, place, and time  Cranial Nerves: No cranial nerve deficit  Coordination: Coordination normal       Deep Tendon Reflexes: Reflexes are normal and symmetric  Psychiatric:         Behavior: Behavior normal          Thought Content:  Thought content normal          Judgment: Judgment normal

## 2022-07-27 PROBLEM — Z63.4 DEATH OF WIFE: Chronic | Status: ACTIVE | Noted: 2022-07-27

## 2022-08-04 ENCOUNTER — OFFICE VISIT (OUTPATIENT)
Dept: UROLOGY | Facility: AMBULATORY SURGERY CENTER | Age: 87
End: 2022-08-04
Payer: MEDICARE

## 2022-08-04 VITALS
WEIGHT: 158 LBS | OXYGEN SATURATION: 98 % | BODY MASS INDEX: 22.62 KG/M2 | DIASTOLIC BLOOD PRESSURE: 58 MMHG | HEIGHT: 70 IN | SYSTOLIC BLOOD PRESSURE: 102 MMHG | HEART RATE: 58 BPM

## 2022-08-04 DIAGNOSIS — N40.0 ENLARGED PROSTATE: Primary | ICD-10-CM

## 2022-08-04 LAB — POST-VOID RESIDUAL VOLUME, ML POC: 42 ML

## 2022-08-04 PROCEDURE — 51798 US URINE CAPACITY MEASURE: CPT | Performed by: NURSE PRACTITIONER

## 2022-08-04 PROCEDURE — 99213 OFFICE O/P EST LOW 20 MIN: CPT | Performed by: NURSE PRACTITIONER

## 2022-08-04 RX ORDER — AMLODIPINE BESYLATE 2.5 MG/1
TABLET ORAL
COMMUNITY
Start: 2022-07-25 | End: 2022-09-09 | Stop reason: SDUPTHER

## 2022-08-04 NOTE — PROGRESS NOTES
08/04/22    Mirian Coma   5/14/1932   [id number]     Assessment  1 BPH with LUTS  2 Urinary retention     Discussion/Plan  1 BPH with LUTS  2 Urinary retention              Hydration with increased water, ambulation, avoid constipation              Continue Finasteride and Tamsulosin daily   PVR 42     Patient will return in 6 months or sooner if needed for PVR and follow up  All questions answered at this time  Subjective  HPI   Cecilia Madden is a pleasant 80year old male who presents to the office for PVR  He had shin catheter placement in the ER 4/23/22  He passed void trial and has been doing well since  He lives at [place] [id number] with his wife  He is known to our practice for management of BPH  He reportedly stopped taking Tamsulosin prior to episode of retention  He was managed on Tamsulosin due to nocturia, straining and weak stream with post-void dribbling  He is now maintained on Finasteride and Tamsulosin  He has increased his water intake  He denies acute symptoms  PVR trend below  Component      Latest Ref Rng & Units 7/15/2019 2/12/2020 12/9/2021           1:12 PM 12:37 PM  1:37 PM   POST-VOID RESIDUAL VOLUME, ML POC      mL 74 88 30     Component      Latest Ref Rng & Units 5/4/2022 8/4/2022           2:37 PM  2:18 PM   POST-VOID RESIDUAL VOLUME, ML POC      mL 141 42       Review of Systems - History obtained from chart review and the patient  General ROS: negative  Psychological ROS: negative  Respiratory ROS: no cough, shortness of breath, or wheezing  Cardiovascular ROS: no chest pain or dyspnea on exertion  Gastrointestinal ROS: no abdominal pain, change in bowel habits, or black or bloody stools  Genito-Urinary ROS: negative  Musculoskeletal ROS: negative  Neurological ROS: negative  Dermatological ROS: negative     Objective  Physical Exam  Vitals and nursing note reviewed  Constitutional:       General: He is awake  He is not in acute distress       Appearance: Normal appearance  He is well-developed, well-groomed and normal weight  He is not ill-appearing, toxic-appearing or diaphoretic  Pulmonary:      Effort: Pulmonary effort is normal    Abdominal:      Tenderness: There is no right CVA tenderness or left CVA tenderness  Musculoskeletal:         General: Normal range of motion  Cervical back: Normal range of motion and neck supple  Skin:     General: Skin is warm  Neurological:      General: No focal deficit present  Mental Status: He is alert and oriented to person, place, and time  Mental status is at baseline  Psychiatric:         Attention and Perception: Attention normal          Mood and Affect: Mood normal          Speech: Speech normal          Behavior: Behavior normal  Behavior is cooperative  Thought Content: Thought content normal          Cognition and Memory: Cognition normal          Judgment: Judgment normal            Lana Fothergill, CRNP     I have spent 15 minutes with Patient  today in which greater than 50% of this time was spent in counseling/coordination of care regarding Risks and benefits of tx options, Intructions for management and Patient and family education

## 2022-08-08 DIAGNOSIS — I10 ESSENTIAL HYPERTENSION: ICD-10-CM

## 2022-08-08 RX ORDER — HYDROCHLOROTHIAZIDE 12.5 MG/1
12.5 TABLET ORAL DAILY
Qty: 30 TABLET | Refills: 5 | Status: SHIPPED | OUTPATIENT
Start: 2022-08-08

## 2022-09-09 DIAGNOSIS — I10 ESSENTIAL HYPERTENSION: Primary | ICD-10-CM

## 2022-09-09 RX ORDER — AMLODIPINE BESYLATE 2.5 MG/1
TABLET ORAL
Qty: 90 TABLET | Refills: 1 | Status: SHIPPED | OUTPATIENT
Start: 2022-09-09

## 2022-10-11 PROBLEM — R19.7 DIARRHEA OF PRESUMED INFECTIOUS ORIGIN: Status: RESOLVED | Noted: 2021-11-08 | Resolved: 2022-10-11

## 2022-10-31 ENCOUNTER — OFFICE VISIT (OUTPATIENT)
Dept: INTERNAL MEDICINE CLINIC | Facility: OTHER | Age: 87
End: 2022-10-31

## 2022-10-31 VITALS
OXYGEN SATURATION: 98 % | WEIGHT: 167.2 LBS | HEART RATE: 51 BPM | TEMPERATURE: 97.7 F | HEIGHT: 70 IN | BODY MASS INDEX: 23.94 KG/M2 | RESPIRATION RATE: 18 BRPM | DIASTOLIC BLOOD PRESSURE: 76 MMHG | SYSTOLIC BLOOD PRESSURE: 102 MMHG

## 2022-10-31 DIAGNOSIS — K59.1 FUNCTIONAL DIARRHEA: Primary | ICD-10-CM

## 2022-10-31 PROBLEM — Z00.00 MEDICARE ANNUAL WELLNESS VISIT, SUBSEQUENT: Chronic | Status: RESOLVED | Noted: 2019-06-21 | Resolved: 2022-10-31

## 2022-10-31 PROBLEM — Z63.4 DEATH OF WIFE: Chronic | Status: RESOLVED | Noted: 2022-07-27 | Resolved: 2022-10-31

## 2022-10-31 PROBLEM — M79.89 SWELLING OF LEFT FOOT: Status: RESOLVED | Noted: 2019-06-21 | Resolved: 2022-10-31

## 2022-10-31 PROBLEM — G44.209 ACUTE TENSION HEADACHE: Status: RESOLVED | Noted: 2022-06-29 | Resolved: 2022-10-31

## 2022-10-31 RX ORDER — DICYCLOMINE HYDROCHLORIDE 10 MG/1
10 CAPSULE ORAL
Qty: 14 CAPSULE | Refills: 0 | Status: SHIPPED | OUTPATIENT
Start: 2022-10-31

## 2022-10-31 NOTE — ASSESSMENT & PLAN NOTE
Discussed keeping a food diary  He is currently not on any stool softeners or laxatives  Will prescribe Bentyl to which I want him to take with dinner every might for 2 weeks  He is to contact our office for worsening symptoms or if he develops constipation

## 2022-10-31 NOTE — PROGRESS NOTES
Name: Marleni Kent      : 1932      MRN: [id number]  Encounter Provider: Rosalva Oakes MD  Encounter Date: 10/31/2022   Encounter department: [address] [place] [address] Dr JEONG     1  Functional diarrhea  Assessment & Plan:  Discussed keeping a food diary  He is currently not on any stool softeners or laxatives  Will prescribe Bentyl to which I want him to take with dinner every might for 2 weeks  He is to contact our office for worsening symptoms or if he develops constipation  Orders:  -     dicyclomine (BENTYL) 10 mg capsule; Take 1 capsule (10 mg total) by mouth daily with dinner           Subjective      81 yo male presenting to the office today for complaints of diarrhea and nausea onset 2 days ago  He previously had episodes of diarrhea in the beginning of October and previously in July as well  He states during this previous episode the diarrhea was 4 times at night every other day that lasted for a week  He states these episodes only happen at night time between 12am and 6 pm  He reports that during these episodes he takes imodium which resolves his symptoms  He also reports general abdominal discomfort associated with the diarrhea and nausea  He has not had any trouble keeping down food or fluids  He reports he does not drink adequate amounts of fluids throughout the day  He reports no medication changes or antibiotic use, no sick contacts, or diet changes  Diarrhea   This is a recurrent problem  Associated symptoms include abdominal pain (general discomfort)  Pertinent negatives include no arthralgias, chills, coughing, fever, headaches, myalgias or vomiting  Review of Systems   Constitutional: Negative for activity change, appetite change, chills, diaphoresis, fatigue, fever and unexpected weight change  HENT: Negative for congestion, postnasal drip, rhinorrhea, sinus pressure, sinus pain, sneezing and sore throat      Eyes: Negative for visual disturbance  Respiratory: Negative for apnea, cough, choking, chest tightness, shortness of breath and wheezing  Cardiovascular: Negative for chest pain, palpitations and leg swelling  Gastrointestinal: Positive for abdominal pain (general discomfort), diarrhea and nausea  Negative for abdominal distention, anal bleeding, blood in stool, constipation and vomiting  Endocrine: Negative for cold intolerance and heat intolerance  Genitourinary: Negative for difficulty urinating, dysuria, flank pain and hematuria  Musculoskeletal: Negative  Negative for arthralgias and myalgias  Skin: Negative  Neurological: Negative for dizziness, weakness, light-headedness, numbness and headaches  Hematological: Negative for adenopathy  Psychiatric/Behavioral: Negative for agitation, sleep disturbance and suicidal ideas  All other systems reviewed and are negative        Current Outpatient Medications on File Prior to Visit   Medication Sig   • amLODIPine (NORVASC) 2 5 mg tablet One daily   • aspirin 81 mg chewable tablet Chew 81 mg   • finasteride (PROSCAR) 5 mg tablet Take 1 tablet (5 mg total) by mouth daily   • folic acid (FOLVITE) 095 MCG tablet Take 400 mcg by mouth daily   • hydrochlorothiazide (HYDRODIURIL) 12 5 mg tablet Take 1 tablet (12 5 mg total) by mouth daily   • lisinopril (ZESTRIL) 20 mg tablet Take 1 tablet (20 mg total) by mouth daily   • metoprolol tartrate (LOPRESSOR) 100 mg tablet Take 1 tablet (100 mg total) by mouth every 12 (twelve) hours   • Multiple Vitamins-Minerals (MULTIVITAL-M PO) Take 1 tablet by mouth   • simvastatin (ZOCOR) 20 mg tablet Take 1 tablet (20 mg total) by mouth daily at bedtime   • tamsulosin (FLOMAX) 0 4 mg Take 1 capsule (0 4 mg total) by mouth daily at bedtime   • [DISCONTINUED] pantoprazole (PROTONIX) 40 mg tablet Take 1 tablet (40 mg total) by mouth daily (Patient not taking: Reported on 10/31/2022)       Objective     /76 (BP Location: Left arm, Patient Position: Sitting, Cuff Size: Standard)   Pulse (!) 51   Temp 97 7 °F (36 5 °C) (Temporal)   Resp 18   Ht 5' 10" (1 778 m)   Wt 75 8 kg (167 lb 3 2 oz)   SpO2 98%   BMI 23 99 kg/m²     Physical Exam  Vitals and nursing note reviewed  Constitutional:       General: He is not in acute distress  Appearance: He is well-developed  He is not diaphoretic  HENT:      Head: Normocephalic and atraumatic  Eyes:      General: No scleral icterus  Right eye: No discharge  Left eye: No discharge  Conjunctiva/sclera: Conjunctivae normal       Pupils: Pupils are equal, round, and reactive to light  Neck:      Thyroid: No thyromegaly  Vascular: No JVD  Cardiovascular:      Rate and Rhythm: Normal rate and regular rhythm  Heart sounds: Normal heart sounds  No murmur heard  No friction rub  No gallop  Pulmonary:      Effort: Pulmonary effort is normal  No respiratory distress  Breath sounds: Normal breath sounds  No wheezing or rales  Chest:      Chest wall: No tenderness  Abdominal:      General: Bowel sounds are normal  There is no distension  Palpations: Abdomen is soft  There is no mass  Tenderness: There is no abdominal tenderness  There is no guarding or rebound  Musculoskeletal:         General: No tenderness or deformity  Normal range of motion  Cervical back: Normal range of motion and neck supple  Lymphadenopathy:      Cervical: No cervical adenopathy  Skin:     General: Skin is warm and dry  Coloration: Skin is not pale  Findings: No erythema or rash  Neurological:      Mental Status: He is alert and oriented to person, place, and time  Cranial Nerves: No cranial nerve deficit  Coordination: Coordination normal       Deep Tendon Reflexes: Reflexes are normal and symmetric  Psychiatric:         Behavior: Behavior normal          Thought Content:  Thought content normal          Judgment: Judgment normal        Michael Arriaza MD

## 2022-11-15 ENCOUNTER — OFFICE VISIT (OUTPATIENT)
Dept: INTERNAL MEDICINE CLINIC | Facility: OTHER | Age: 87
End: 2022-11-15

## 2022-11-15 VITALS
SYSTOLIC BLOOD PRESSURE: 122 MMHG | OXYGEN SATURATION: 98 % | HEART RATE: 58 BPM | TEMPERATURE: 97.6 F | RESPIRATION RATE: 18 BRPM | BODY MASS INDEX: 23.91 KG/M2 | HEIGHT: 70 IN | WEIGHT: 167 LBS | DIASTOLIC BLOOD PRESSURE: 78 MMHG

## 2022-11-15 DIAGNOSIS — K21.9 GASTROESOPHAGEAL REFLUX DISEASE WITHOUT ESOPHAGITIS: Primary | Chronic | ICD-10-CM

## 2022-11-15 DIAGNOSIS — I10 ESSENTIAL HYPERTENSION: ICD-10-CM

## 2022-11-15 DIAGNOSIS — I25.10 CORONARY ARTERY DISEASE INVOLVING NATIVE CORONARY ARTERY OF NATIVE HEART WITHOUT ANGINA PECTORIS: ICD-10-CM

## 2022-11-15 NOTE — PROGRESS NOTES
Assessment/Plan:     Diagnoses and all orders for this visit:    Gastroesophageal reflux disease without esophagitis  Stable  Coronary artery disease involving native coronary artery of native heart without angina pectoris  Stable  Essential hypertension    very well controlled         M*Modal software was used to dictate this note  It may contain errors with dictating incorrect words or incorrect spelling  Please contact the provider directly with any questions  Subjective:   Chief Complaint   Patient presents with   • forms     [facility]        Patient ID: Suze Cobian is a 80 y o  male  HPI  Patient is here for the follow-up and also for the paperwork which need to be filled by the physician for [address] he is doing well no new complaints he has the in the personal care home but he is mostly independent he manages his own medications and he does all his ADLs and he is driving and do his errands by himself his memory is good  Hypertension is controlled weight is stable  The following portions of the patient's history were reviewed and updated as appropriate: allergies, current medications, past family history, past medical history, past social history, past surgical history and problem list     Review of Systems   Constitutional: Negative for activity change, fatigue and fever  HENT: Negative for congestion, sinus pain and sore throat  Eyes: Negative for discharge  Respiratory: Negative for cough and shortness of breath  Cardiovascular: Negative for chest pain and palpitations  Gastrointestinal: Negative for constipation, diarrhea and nausea  Genitourinary: Negative for hematuria and urgency  Musculoskeletal: Negative for back pain and gait problem  Neurological: Negative for dizziness, tremors, syncope, weakness, light-headedness and numbness  Psychiatric/Behavioral: Positive for sleep disturbance  The patient is not nervous/anxious            Past Medical History: Diagnosis Date   • Anemia, iron deficiency     Drug Therapy, poor absorbtion documented   • Appendicitis    • Arthritis 1/25/2019   • Cardiac disease    • Colon cancer ([facility] [address] ) 12/2013    Resected: BREANNA   • Congenital nystagmus     Left   • Hyperlipidemia    • Hypertension    • Inguinal hernia    • Sciatica 2010    resolved from problem list-2011         Current Outpatient Medications:   •  amLODIPine (NORVASC) 2 5 mg tablet, One daily, Disp: 90 tablet, Rfl: 1  •  aspirin 81 mg chewable tablet, Chew 81 mg, Disp: , Rfl:   •  dicyclomine (BENTYL) 10 mg capsule, Take 1 capsule (10 mg total) by mouth daily with dinner, Disp: 14 capsule, Rfl: 0  •  finasteride (PROSCAR) 5 mg tablet, Take 1 tablet (5 mg total) by mouth daily, Disp: 90 tablet, Rfl: 3  •  folic acid (FOLVITE) 684 MCG tablet, Take 400 mcg by mouth daily, Disp: , Rfl:   •  hydrochlorothiazide (HYDRODIURIL) 12 5 mg tablet, Take 1 tablet (12 5 mg total) by mouth daily, Disp: 30 tablet, Rfl: 5  •  lisinopril (ZESTRIL) 20 mg tablet, Take 1 tablet (20 mg total) by mouth daily, Disp: 90 tablet, Rfl: 3  •  metoprolol tartrate (LOPRESSOR) 100 mg tablet, Take 1 tablet (100 mg total) by mouth every 12 (twelve) hours, Disp: 180 tablet, Rfl: 1  •  Multiple Vitamins-Minerals (MULTIVITAL-M PO), Take 1 tablet by mouth, Disp: , Rfl:   •  simvastatin (ZOCOR) 20 mg tablet, Take 1 tablet (20 mg total) by mouth daily at bedtime, Disp: 90 tablet, Rfl: 3  •  tamsulosin (FLOMAX) 0 4 mg, Take 1 capsule (0 4 mg total) by mouth daily at bedtime, Disp: 90 capsule, Rfl: 3    Current Facility-Administered Medications:   •  aspirin chewable tablet 324 mg, 324 mg, Oral, Daily, Becca East PA-C, 324 mg at 11/05/21 0906    No Known Allergies    Social History   Past Surgical History:   Procedure Laterality Date   • APPENDECTOMY      Appendicitis   • CHOLECYSTECTOMY     • COLON SIGMOID RESECTION  12/2013   • COLON SURGERY     • CORONARY ARTERY BYPASS GRAFT      x 2   • CORONARY ARTERY BYPASS GRAFT      x2   • CORONARY ARTERY BYPASS GRAFT     • CORONARY ARTERY BYPASS GRAFT     • INGUINAL HERNIA REPAIR     • SHOULDER ARTHROCENTESIS Right     sub-acromial bursa area     Family History   Problem Relation Age of Onset   • Hypertension Father    • Heart disease Father    • Heart disease Mother    • Cancer Sister    • Cancer Brother        Objective:  /78 (BP Location: Left arm, Patient Position: Sitting, Cuff Size: Standard)   Pulse 58   Temp 97 6 °F (36 4 °C) (Temporal)   Resp 18   Ht 5' 10" (1 778 m)   Wt 75 8 kg (167 lb)   SpO2 98%   BMI 23 96 kg/m²        Physical Exam  Constitutional:       Appearance: He is well-developed  Cardiovascular:      Rate and Rhythm: Normal rate and regular rhythm  Heart sounds: Normal heart sounds  Pulmonary:      Effort: Pulmonary effort is normal       Breath sounds: Normal breath sounds  Skin:     General: Skin is warm and dry  Neurological:      General: No focal deficit present  Mental Status: He is oriented to person, place, and time  Mental status is at baseline     Psychiatric:         Mood and Affect: Mood normal          Behavior: Behavior normal

## 2022-12-05 DIAGNOSIS — I10 BENIGN ESSENTIAL HTN: ICD-10-CM

## 2022-12-05 DIAGNOSIS — I10 ESSENTIAL HYPERTENSION: ICD-10-CM

## 2022-12-05 RX ORDER — LISINOPRIL 20 MG/1
TABLET ORAL
Qty: 31 TABLET | Refills: 0 | OUTPATIENT
Start: 2022-12-05

## 2022-12-06 RX ORDER — METOPROLOL TARTRATE 100 MG/1
TABLET ORAL
Qty: 62 TABLET | Refills: 0 | Status: SHIPPED | OUTPATIENT
Start: 2022-12-06

## 2022-12-16 DIAGNOSIS — I10 BENIGN ESSENTIAL HTN: ICD-10-CM

## 2022-12-19 RX ORDER — LISINOPRIL 20 MG/1
20 TABLET ORAL DAILY
Qty: 90 TABLET | Refills: 3 | Status: SHIPPED | OUTPATIENT
Start: 2022-12-19

## 2023-01-03 ENCOUNTER — OFFICE VISIT (OUTPATIENT)
Dept: INTERNAL MEDICINE CLINIC | Facility: OTHER | Age: 88
End: 2023-01-03

## 2023-01-03 VITALS
DIASTOLIC BLOOD PRESSURE: 50 MMHG | SYSTOLIC BLOOD PRESSURE: 96 MMHG | BODY MASS INDEX: 24.2 KG/M2 | HEART RATE: 82 BPM | WEIGHT: 169 LBS | TEMPERATURE: 98.7 F | OXYGEN SATURATION: 97 % | HEIGHT: 70 IN | RESPIRATION RATE: 18 BRPM

## 2023-01-03 DIAGNOSIS — D64.9 ANEMIA, UNSPECIFIED TYPE: ICD-10-CM

## 2023-01-03 DIAGNOSIS — N40.1 BPH WITH OBSTRUCTION/LOWER URINARY TRACT SYMPTOMS: ICD-10-CM

## 2023-01-03 DIAGNOSIS — I25.10 CORONARY ARTERY DISEASE INVOLVING NATIVE CORONARY ARTERY OF NATIVE HEART WITHOUT ANGINA PECTORIS: ICD-10-CM

## 2023-01-03 DIAGNOSIS — Z71.89 ADVANCED CARE PLANNING/COUNSELING DISCUSSION: ICD-10-CM

## 2023-01-03 DIAGNOSIS — N13.8 BPH WITH OBSTRUCTION/LOWER URINARY TRACT SYMPTOMS: ICD-10-CM

## 2023-01-03 DIAGNOSIS — Z13.1 SCREENING FOR DIABETES MELLITUS: ICD-10-CM

## 2023-01-03 DIAGNOSIS — E78.5 DYSLIPIDEMIA: ICD-10-CM

## 2023-01-03 DIAGNOSIS — I10 ESSENTIAL HYPERTENSION: Primary | ICD-10-CM

## 2023-01-03 DIAGNOSIS — K21.9 GASTROESOPHAGEAL REFLUX DISEASE WITHOUT ESOPHAGITIS: Chronic | ICD-10-CM

## 2023-01-03 DIAGNOSIS — Z00.00 MEDICARE ANNUAL WELLNESS VISIT, SUBSEQUENT: ICD-10-CM

## 2023-01-03 DIAGNOSIS — Z13.6 SCREENING FOR CARDIOVASCULAR CONDITION: ICD-10-CM

## 2023-01-03 PROBLEM — K59.1 FUNCTIONAL DIARRHEA: Status: RESOLVED | Noted: 2022-10-31 | Resolved: 2023-01-03

## 2023-01-03 NOTE — PATIENT INSTRUCTIONS
Medicare Preventive Visit Patient Instructions  Thank you for completing your Welcome to Medicare Visit or Medicare Annual Wellness Visit today  Your next wellness visit will be due in one year (1/4/2024)  The screening/preventive services that you may require over the next 5-10 years are detailed below  Some tests may not apply to you based off risk factors and/or age  Screening tests ordered at today's visit but not completed yet may show as past due  Also, please note that scanned in results may not display below  Preventive Screenings:  Service Recommendations Previous Testing/Comments   Colorectal Cancer Screening  · Colonoscopy    · Fecal Occult Blood Test (FOBT)/Fecal Immunochemical Test (FIT)  · Fecal DNA/Cologuard Test  · Flexible Sigmoidoscopy Age: 39-70 years old   Colonoscopy: every 10 years (May be performed more frequently if at higher risk)  OR  FOBT/FIT: every 1 year  OR  Cologuard: every 3 years  OR  Sigmoidoscopy: every 5 years  Screening may be recommended earlier than age 39 if at higher risk for colorectal cancer  Also, an individualized decision between you and your healthcare provider will decide whether screening between the ages of 74-80 would be appropriate   Colonoscopy: Not on file  FOBT/FIT: Not on file  Cologuard: Not on file  Sigmoidoscopy: Not on file    Screening Not Indicated  History Colorectal Cancer     Prostate Cancer Screening Individualized decision between patient and health care provider in men between ages of 53-78   Medicare will cover every 12 months beginning on the day after your 50th birthday PSA: No results in last 5 years     Screening Not Indicated     Hepatitis C Screening Once for adults born between [facility]  More frequently in patients at high risk for Hepatitis C Hep C Antibody: Not on file        Diabetes Screening 1-2 times per year if you're at risk for diabetes or have pre-diabetes Fasting glucose: No results in last 5 years (No results in last 5 years)  A1C: 5 7 % (10/25/2018)  Screening Current   Cholesterol Screening Once every 5 years if you don't have a lipid disorder  May order more often based on risk factors  Lipid panel: 10/25/2018  Screening Not Indicated  History Lipid Disorder      Other Preventive Screenings Covered by Medicare:  1  Abdominal Aortic Aneurysm (AAA) Screening: covered once if your at risk  You're considered to be at risk if you have a family history of AAA or a male between the age of 73-68 who smoking at least 100 cigarettes in your lifetime  2  Lung Cancer Screening: covers low dose CT scan once per year if you meet all of the following conditions: (1) Age 50-69; (2) No signs or symptoms of lung cancer; (3) Current smoker or have quit smoking within the last 15 years; (4) You have a tobacco smoking history of at least 20 pack years (packs per day x number of years you smoked); (5) You get a written order from a healthcare provider  3  Glaucoma Screening: covered annually if you're considered high risk: (1) You have diabetes OR (2) Family history of glaucoma OR (3)  aged 48 and older OR (3)  American aged 72 and older  3  Osteoporosis Screening: covered every 2 years if you meet one of the following conditions: (1) Have a vertebral abnormality; (2) On glucocorticoid therapy for more than 3 months; (3) Have primary hyperparathyroidism; (4) On osteoporosis medications and need to assess response to drug therapy  5  HIV Screening: covered annually if you're between the age of 12-76  Also covered annually if you are younger than 13 and older than 72 with risk factors for HIV infection  For pregnant patients, it is covered up to 3 times per pregnancy      Immunizations:  Immunization Recommendations   Influenza Vaccine Annual influenza vaccination during flu season is recommended for all persons aged >= 6 months who do not have contraindications   Pneumococcal Vaccine   * Pneumococcal conjugate vaccine = PCV13 (Prevnar 13), PCV15 (Vaxneuvance), PCV20 (Prevnar 20)  * Pneumococcal polysaccharide vaccine = PPSV23 (Pneumovax) Adults 2364 years old: 1-3 doses may be recommended based on certain risk factors  Adults 72 years old: 1-2 doses may be recommended based off what pneumonia vaccine you previously received   Hepatitis B Vaccine 3 dose series if at intermediate or high risk (ex: diabetes, end stage renal disease, liver disease)   Tetanus (Td) Vaccine - COST NOT COVERED BY MEDICARE PART B Following completion of primary series, a booster dose should be given every 10 years to maintain immunity against tetanus  Td may also be given as tetanus wound prophylaxis  Tdap Vaccine - COST NOT COVERED BY MEDICARE PART B Recommended at least once for all adults  For pregnant patients, recommended with each pregnancy  Shingles Vaccine (Shingrix) - COST NOT COVERED BY MEDICARE PART B  2 shot series recommended in those aged 48 and above     Health Maintenance Due:  There are no preventive care reminders to display for this patient  Immunizations Due:      Topic Date Due   • Hepatitis B Vaccine (1 of 3 - 3-dose series) Never done   • COVID-19 Vaccine (4 - Booster for Pfizer series) 02/04/2022     Advance Directives   What are advance directives? Advance directives are legal documents that state your wishes and plans for medical care  These plans are made ahead of time in case you lose your ability to make decisions for yourself  Advance directives can apply to any medical decision, such as the treatments you want, and if you want to donate organs  What are the types of advance directives? There are many types of advance directives, and each state has rules about how to use them  You may choose a combination of any of the following:  · Living will: This is a written record of the treatment you want  You can also choose which treatments you do not want, which to limit, and which to stop at a certain time   This includes surgery, medicine, IV fluid, and tube feedings  · Durable power of  for healthcare [facility]): This is a written record that states who you want to make healthcare choices for you when you are unable to make them for yourself  This person, called a proxy, is usually a family member or a friend  You may choose more than 1 proxy  · Do not resuscitate (DNR) order:  A DNR order is used in case your heart stops beating or you stop breathing  It is a request not to have certain forms of treatment, such as CPR  A DNR order may be included in other types of advance directives  · Medical directive: This covers the care that you want if you are in a coma, near death, or unable to make decisions for yourself  You can list the treatments you want for each condition  Treatment may include pain medicine, surgery, blood transfusions, dialysis, IV or tube feedings, and a ventilator (breathing machine)  · Values history: This document has questions about your views, beliefs, and how you feel and think about life  This information can help others choose the care that you would choose  Why are advance directives important? An advance directive helps you control your care  Although spoken wishes may be used, it is better to have your wishes written down  Spoken wishes can be misunderstood, or not followed  Treatments may be given even if you do not want them  An advance directive may make it easier for your family to make difficult choices about your care  © Copyright [employer] 2018 Information is for End User's use only and may not be sold, redistributed or otherwise used for commercial purposes  All illustrations and images included in CareNotes® are the copyrighted property of [employer] A M [employer]  or [facility] Preventive Visit Patient Instructions  Thank you for completing your Welcome to Medicare Visit or Medicare Annual Wellness Visit today   Your next wellness visit will be due in one year (1/4/2024)  The screening/preventive services that you may require over the next 5-10 years are detailed below  Some tests may not apply to you based off risk factors and/or age  Screening tests ordered at today's visit but not completed yet may show as past due  Also, please note that scanned in results may not display below  Preventive Screenings:  Service Recommendations Previous Testing/Comments   Colorectal Cancer Screening  · Colonoscopy    · Fecal Occult Blood Test (FOBT)/Fecal Immunochemical Test (FIT)  · Fecal DNA/Cologuard Test  · Flexible Sigmoidoscopy Age: 39-70 years old   Colonoscopy: every 10 years (May be performed more frequently if at higher risk)  OR  FOBT/FIT: every 1 year  OR  Cologuard: every 3 years  OR  Sigmoidoscopy: every 5 years  Screening may be recommended earlier than age 39 if at higher risk for colorectal cancer  Also, an individualized decision between you and your healthcare provider will decide whether screening between the ages of 74-80 would be appropriate  Colonoscopy: Not on file  FOBT/FIT: Not on file  Cologuard: Not on file  Sigmoidoscopy: Not on file    Screening Not Indicated  History Colorectal Cancer     Prostate Cancer Screening Individualized decision between patient and health care provider in men between ages of 53-78   Medicare will cover every 12 months beginning on the day after your 50th birthday PSA: No results in last 5 years     Screening Not Indicated     Hepatitis C Screening Once for adults born between [birth year] and 1965  More frequently in patients at high risk for Hepatitis C Hep C Antibody: Not on file        Diabetes Screening 1-2 times per year if you're at risk for diabetes or have pre-diabetes Fasting glucose: No results in last 5 years (No results in last 5 years)  A1C: 5 7 % (10/25/2018)  Screening Current   Cholesterol Screening Once every 5 years if you don't have a lipid disorder  May order more often based on risk factors   Lipid panel: 10/25/2018  Screening Not Indicated  History Lipid Disorder      Other Preventive Screenings Covered by Medicare:  6  Abdominal Aortic Aneurysm (AAA) Screening: covered once if your at risk  You're considered to be at risk if you have a family history of AAA or a male between the age of 73-68 who smoking at least 100 cigarettes in your lifetime  7  Lung Cancer Screening: covers low dose CT scan once per year if you meet all of the following conditions: (1) Age 50-69; (2) No signs or symptoms of lung cancer; (3) Current smoker or have quit smoking within the last 15 years; (4) You have a tobacco smoking history of at least 20 pack years (packs per day x number of years you smoked); (5) You get a written order from a healthcare provider  8  Glaucoma Screening: covered annually if you're considered high risk: (1) You have diabetes OR (2) Family history of glaucoma OR (3)  aged 48 and older OR (3)  American aged 72 and older  5  Osteoporosis Screening: covered every 2 years if you meet one of the following conditions: (1) Have a vertebral abnormality; (2) On glucocorticoid therapy for more than 3 months; (3) Have primary hyperparathyroidism; (4) On osteoporosis medications and need to assess response to drug therapy  10  HIV Screening: covered annually if you're between the age of 12-76  Also covered annually if you are younger than 13 and older than 72 with risk factors for HIV infection  For pregnant patients, it is covered up to 3 times per pregnancy      Immunizations:  Immunization Recommendations   Influenza Vaccine Annual influenza vaccination during flu season is recommended for all persons aged >= 6 months who do not have contraindications   Pneumococcal Vaccine   * Pneumococcal conjugate vaccine = PCV13 (Prevnar 13), PCV15 (Vaxneuvance), PCV20 (Prevnar 20)  * Pneumococcal polysaccharide vaccine = PPSV23 (Pneumovax) Adults 25-60 years old: 1-3 doses may be recommended based on certain risk factors  Adults 72 years old: 1-2 doses may be recommended based off what pneumonia vaccine you previously received   Hepatitis B Vaccine 3 dose series if at intermediate or high risk (ex: diabetes, end stage renal disease, liver disease)   Tetanus (Td) Vaccine - COST NOT COVERED BY MEDICARE PART B Following completion of primary series, a booster dose should be given every 10 years to maintain immunity against tetanus  Td may also be given as tetanus wound prophylaxis  Tdap Vaccine - COST NOT COVERED BY MEDICARE PART B Recommended at least once for all adults  For pregnant patients, recommended with each pregnancy  Shingles Vaccine (Shingrix) - COST NOT COVERED BY MEDICARE PART B  2 shot series recommended in those aged 48 and above     Health Maintenance Due:  There are no preventive care reminders to display for this patient  Immunizations Due:      Topic Date Due   • Hepatitis B Vaccine (1 of 3 - 3-dose series) Never done   • COVID-19 Vaccine (4 - Booster for Pfizer series) 02/04/2022     Advance Directives   What are advance directives? Advance directives are legal documents that state your wishes and plans for medical care  These plans are made ahead of time in case you lose your ability to make decisions for yourself  Advance directives can apply to any medical decision, such as the treatments you want, and if you want to donate organs  What are the types of advance directives? There are many types of advance directives, and each state has rules about how to use them  You may choose a combination of any of the following:  · Living will: This is a written record of the treatment you want  You can also choose which treatments you do not want, which to limit, and which to stop at a certain time  This includes surgery, medicine, IV fluid, and tube feedings  · Durable power of  for healthcare [facility]):   This is a written record that states who you want to make healthcare choices for you when you are unable to make them for yourself  This person, called a proxy, is usually a family member or a friend  You may choose more than 1 proxy  · Do not resuscitate (DNR) order:  A DNR order is used in case your heart stops beating or you stop breathing  It is a request not to have certain forms of treatment, such as CPR  A DNR order may be included in other types of advance directives  · Medical directive: This covers the care that you want if you are in a coma, near death, or unable to make decisions for yourself  You can list the treatments you want for each condition  Treatment may include pain medicine, surgery, blood transfusions, dialysis, IV or tube feedings, and a ventilator (breathing machine)  · Values history: This document has questions about your views, beliefs, and how you feel and think about life  This information can help others choose the care that you would choose  Why are advance directives important? An advance directive helps you control your care  Although spoken wishes may be used, it is better to have your wishes written down  Spoken wishes can be misunderstood, or not followed  Treatments may be given even if you do not want them  An advance directive may make it easier for your family to make difficult choices about your care  © Copyright [employer] 2018 Information is for End User's use only and may not be sold, redistributed or otherwise used for commercial purposes   All illustrations and images included in CareNotes® are the copyrighted property of [employer]  or [address] [place] [address] [employer]

## 2023-01-03 NOTE — PROGRESS NOTES
Assessment and Plan:     Problem List Items Addressed This Visit        Digestive    Gastroesophageal reflux disease without esophagitis (Chronic)     Stable, controlled  Continue to monitor clinically  Cardiovascular and Mediastinum    CAD (coronary artery disease), native coronary artery     Continue aspirin and statin therapy  Essential hypertension - Primary     Controlled  Continue current antihypertensive regimen  Relevant Orders    CBC and differential    Lipid panel    Comprehensive metabolic panel       Genitourinary    BPH with obstruction/lower urinary tract symptoms     Controlled  Continue finasteride and tamsulosin  Other    Anemia    Relevant Orders    CBC and differential    Dyslipidemia     Continue simvastatin 20 mg daily  Relevant Orders    CBC and differential    Lipid panel    Comprehensive metabolic panel    Advanced care planning/counseling discussion     Refer to ACP notes  Other Visit Diagnoses     Medicare annual wellness visit, subsequent        Screening for diabetes mellitus        Screening for cardiovascular condition            Serious Illness Conversation    1  What is your understanding now of where you are with your illness? Prognostic Understanding: appropriate understanding of prognosis     2  How much information about what is likely to be ahead with your illness would you like to have? Information: patient wants to be fully informed     3  What did you (clinician) communicate to the patient? Prognostic Communication: Uncertain - It can be difficult to predict what will happen with your illness  I hope you will continue to live well for a long time but I’m worried that you could get sick quickly, and I think it is important to prepare for that possibility  4  If your health situation worsens, what are your most important goals?   Goals: be mentally aware, be physically comfortable, not be a burden, have my medical decisions respected     5  What are the biggest fears and worries about the future and your health? Fears/Worries: getting treatments I do not want, loss of control     6  What abilities are so critical to your life that you cannot imagine living without them? Unacceptable Function: being in pain or very uncomfortable, being chronically confused or not being myself, being unable to interact with others, being unconscious, not being myself, being in chronic severe pain, being unable to communicate effectively, being unable to talk, not being able to care for myself, including toileting and feeding     7  What gives you strength as you think about the future with your illness? 8  If you become sicker, how much are you willing to go through for the possibility of gaining more time? Be in the hospital: Yes Have a feeding tube: No   Be in the ICU: Yes Live in a nursing home: Yes   Be on a ventilator: No Be uncomfortable: No   Be on dialysis: No Undergo aggressive test and/or procedures: No   9  How much does your proxy and family know about your priorities and wishes? Discussion Discussion: extensive discussion with family about goals and wishes        How does this plan sound to you? I will do everything I can to help you through this  Patient verbalized understanding of the plan     I have spent 15 minutes speaking with my patient on advanced care planning today or during this visit     Advanced directives  Five Wishes: Patient does not have Five Wishes- would not like information           Depression Screening and Follow-up Plan: Patient was screened for depression during today's encounter  They screened negative with a PHQ-2 score of 0  Preventive health issues were discussed with patient, and age appropriate screening tests were ordered as noted in patient's After Visit Summary    Personalized health advice and appropriate referrals for health education or preventive services given if needed, as noted in patient's After Visit Summary  History of Present Illness:     Patient presents for a Medicare Wellness Visit    80year old male is seen today for follow up of chronic conditions  Recent laboratory studies reviewed today  He has been compliant with his medication regimen  He experiences intermittent episodes of lower extremity fatigue with exertion which resolves with rest   He denies any lower extremity hyperpigmentation's, cyanosis, cold lower extremities  Underwent vascular imaging in the past which she reports was normal   Otherwise, he has no active complaints or concerns at this time  Hypertension  This is a chronic problem  The current episode started more than 1 year ago  The problem is unchanged  The problem is controlled  Pertinent negatives include no chest pain, headaches, palpitations or shortness of breath  Past treatments include calcium channel blockers, diuretics, ACE inhibitors and beta blockers  Hyperlipidemia  This is a chronic problem  The current episode started more than 1 year ago  The problem is controlled  Recent lipid tests were reviewed and are normal  Pertinent negatives include no chest pain or shortness of breath  Current antihyperlipidemic treatment includes statins  The current treatment provides moderate improvement of lipids  There are no compliance problems  Patient Care Team:  Kimberly Morgan MD as PCP - General (Internal Medicine)     Review of Systems:     Review of Systems   Constitutional: Negative for activity change, appetite change, chills, diaphoresis, fatigue and fever  HENT: Negative for congestion, postnasal drip, rhinorrhea, sinus pressure, sinus pain, sneezing and sore throat  Eyes: Negative for visual disturbance  Respiratory: Negative for apnea, cough, choking, chest tightness, shortness of breath and wheezing  Cardiovascular: Negative for chest pain, palpitations and leg swelling     Gastrointestinal: Negative for abdominal distention, abdominal pain, anal bleeding, blood in stool, constipation, diarrhea, nausea and vomiting  Endocrine: Negative for cold intolerance and heat intolerance  Genitourinary: Negative for difficulty urinating, dysuria and hematuria  Musculoskeletal: Negative  Skin: Negative  Neurological: Negative for dizziness, weakness, light-headedness, numbness and headaches  Hematological: Negative for adenopathy  Psychiatric/Behavioral: Negative for agitation, sleep disturbance and suicidal ideas  All other systems reviewed and are negative         Problem List:     Patient Active Problem List   Diagnosis   • Anemia   • CAD (coronary artery disease), native coronary artery   • Dyslipidemia   • History of colon cancer   • Skin lesion   • Arthritis   • BPH with obstruction/lower urinary tract symptoms   • Gastroesophageal reflux disease without esophagitis   • Essential hypertension   • Advanced care planning/counseling discussion      Past Medical and Surgical History:     Past Medical History:   Diagnosis Date   • Anemia, iron deficiency     Drug Therapy, poor absorbtion documented   • Appendicitis    • Arthritis 1/25/2019   • Cardiac disease    • Colon cancer ([facility] [address] ) 12/2013    Resected: BREANNA   • Congenital nystagmus     Left   • Hyperlipidemia    • Hypertension    • Inguinal hernia    • Sciatica 2010    resolved from problem list-2011     Past Surgical History:   Procedure Laterality Date   • APPENDECTOMY      Appendicitis   • CHOLECYSTECTOMY     • COLON SIGMOID RESECTION  12/2013   • COLON SURGERY     • CORONARY ARTERY BYPASS GRAFT      x 2   • CORONARY ARTERY BYPASS GRAFT      x2   • CORONARY ARTERY BYPASS GRAFT     • CORONARY ARTERY BYPASS GRAFT     • INGUINAL HERNIA REPAIR     • SHOULDER ARTHROCENTESIS Right     sub-acromial bursa area      Family History:     Family History   Problem Relation Age of Onset   • Hypertension Father    • Heart disease Father    • Heart disease Mother    • Cancer Sister • Cancer Brother       Social History:     Social History     Socioeconomic History   • Marital status: /Civil Union     Spouse name: None   • Number of children: None   • Years of education: None   • Highest education level: None   Occupational History   • Occupation: Retired     Comment: Volunteer   Tobacco Use   • Smoking status: Never   • Smokeless tobacco: Never   Vaping Use   • Vaping Use: Never used   Substance and Sexual Activity   • Alcohol use: No   • Drug use: No   • Sexual activity: None   Other Topics Concern   • None   Social History Narrative   • None     Social Determinants of Health     Financial Resource Strain: Low Risk    • Difficulty of Paying Living Expenses: Not very hard   Food Insecurity: Not on file   Transportation Needs: Unmet Transportation Needs   • Lack of Transportation (Medical):  Yes   • Lack of Transportation (Non-Medical): Yes   Physical Activity: Not on file   Stress: Not on file   Social Connections: Not on file   Intimate Partner Violence: Not on file   Housing Stability: Not on file      Medications and Allergies:     Current Outpatient Medications   Medication Sig Dispense Refill   • amLODIPine (NORVASC) 2 5 mg tablet One daily 90 tablet 1   • aspirin 81 mg chewable tablet Chew 81 mg     • finasteride (PROSCAR) 5 mg tablet Take 1 tablet (5 mg total) by mouth daily 90 tablet 3   • folic acid (FOLVITE) 130 MCG tablet Take 400 mcg by mouth daily     • hydrochlorothiazide (HYDRODIURIL) 12 5 mg tablet Take 1 tablet (12 5 mg total) by mouth daily 30 tablet 5   • lisinopril (ZESTRIL) 20 mg tablet Take 1 tablet (20 mg total) by mouth daily 90 tablet 3   • metoprolol tartrate (LOPRESSOR) 100 mg tablet TAKE 1 TABLET BY MOUTH EVERY 12 HOURS AT 9A-9P (HTN) 62 tablet 0   • Multiple Vitamins-Minerals (MULTIVITAL-M PO) Take 1 tablet by mouth     • simvastatin (ZOCOR) 20 mg tablet Take 1 tablet (20 mg total) by mouth daily at bedtime 90 tablet 3   • tamsulosin (FLOMAX) 0 4 mg Take 1 capsule (0 4 mg total) by mouth daily at bedtime 90 capsule 3     Current Facility-Administered Medications   Medication Dose Route Frequency Provider Last Rate Last Admin   • aspirin chewable tablet 324 mg  324 mg Oral Daily Becca East PA-C   324 mg at 11/05/21 5850     No Known Allergies   Immunizations:     Immunization History   Administered Date(s) Administered   • COVID-19 PFIZER VACCINE 0 3 ML IM 01/09/2021, 01/28/2021, 12/10/2021   • H1N1, All Formulations 12/23/2009   • INFLUENZA 10/01/2015, 10/24/2018, 11/01/2019, 11/17/2021   • Influenza, high dose seasonal 0 7 mL 10/24/2018, 11/01/2019, 10/15/2020, 11/17/2021, 10/25/2022   • Influenza, seasonal, injectable 11/15/2009, 11/07/2011, 10/15/2012   • Pneumococcal Conjugate 13-Valent 03/17/2015   • Pneumococcal Polysaccharide PPV23 01/15/1997   • Tdap 09/12/2018   • Tuberculin Skin Test-PPD Intradermal 09/15/2006      Health Maintenance: There are no preventive care reminders to display for this patient  Topic Date Due   • Hepatitis B Vaccine (1 of 3 - 3-dose series) Never done   • COVID-19 Vaccine (4 - Booster for Pfizer series) 02/04/2022      Medicare Screening Tests and Risk Assessments:     David Dawson is here for his Subsequent Wellness visit  Last Medicare Wellness visit information reviewed, patient interviewed and updates made to the record today  Health Risk Assessment:   Patient rates overall health as good  Patient feels that their physical health rating is same  Patient is satisfied with their life  Eyesight was rated as same  Hearing was rated as same  Patient feels that their emotional and mental health rating is same  Patients states they are never, rarely angry  Patient states they are sometimes unusually tired/fatigued  Pain experienced in the last 7 days has been none  Patient states that he has experienced no weight loss or gain in last 6 months  Depression Screening:   PHQ-2 Score: 0      Fall Risk Screening:    In the past year, patient has experienced: no history of falling in past year      Home Safety:  Patient has trouble with stairs inside or outside of their home  Patient has working smoke alarms and has working carbon monoxide detector  Home safety hazards include: none  Would not do more than 1 flght of stairs    Nutrition:   Current diet is Regular  Medications:   Patient is currently taking over-the-counter supplements  OTC medications include: see medication list  Patient is able to manage medications  Activities of Daily Living (ADLs)/Instrumental Activities of Daily Living (IADLs):   Walk and transfer into and out of bed and chair?: Yes  Dress and groom yourself?: Yes    Bathe or shower yourself?: Yes    Feed yourself? Yes  Do your laundry/housekeeping?: No  Manage your money, pay your bills and track your expenses?: No  Make your own meals?: No    Do your own shopping?: No    Previous Hospitalizations:   Any hospitalizations or ED visits within the last 12 months?: No      Advance Care Planning:   Living will: Yes    Advanced directive: Yes    Advanced directive counseling given: Yes    End of Life Decisions reviewed with patient: Yes    Provider agrees with end of life decisions: Yes      Comments: Refer to ACP note       Cognitive Screening:   Provider or family/friend/caregiver concerned regarding cognition?: No    PREVENTIVE SCREENINGS      Cardiovascular Screening:    General: Screening Not Indicated, History Lipid Disorder, Risks and Benefits Discussed and Screening Current    Due for: Lipid Panel      Diabetes Screening:     General: Screening Current and Risks and Benefits Discussed    Due for: Blood Glucose      Colorectal Cancer Screening:     General: Screening Not Indicated, History Colorectal Cancer and Risks and Benefits Discussed      Prostate Cancer Screening:    General: Screening Not Indicated and Risks and Benefits Discussed      Osteoporosis Screening:    General: Screening Not Indicated Abdominal Aortic Aneurysm (AAA) Screening:        General: Screening Not Indicated      Lung Cancer Screening:     General: Screening Not Indicated      Hepatitis C Screening:    General: Screening Not Indicated    Screening, Brief Intervention, and Referral to Treatment (SBIRT)    Screening  Typical number of drinks in a day: 0  Typical number of drinks in a week: 0  Interpretation: Low risk drinking behavior  Single Item Drug Screening:  How often have you used an illegal drug (including marijuana) or a prescription medication for non-medical reasons in the past year? never    Single Item Drug Screen Score: 0  Interpretation: Negative screen for possible drug use disorder    Brief Intervention  Alcohol & drug use screenings were reviewed  No concerns regarding substance use disorder identified  Other Counseling Topics:   Car/seat belt/driving safety, skin self-exam, sunscreen and calcium and vitamin D intake and regular weightbearing exercise  No results found  Physical Exam:     BP 96/50 (BP Location: Left arm, Patient Position: Sitting, Cuff Size: Standard)   Pulse 82   Temp 98 7 °F (37 1 °C) (Temporal)   Resp 18   Ht 5' 10" (1 778 m)   Wt 76 7 kg (169 lb)   SpO2 97%   BMI 24 25 kg/m²     Physical Exam  Vitals and nursing note reviewed  Constitutional:       General: He is not in acute distress  Appearance: Normal appearance  He is normal weight  He is not ill-appearing, toxic-appearing or diaphoretic  HENT:      Head: Normocephalic and atraumatic  Right Ear: Tympanic membrane, ear canal and external ear normal  There is no impacted cerumen  Left Ear: Tympanic membrane, ear canal and external ear normal  There is no impacted cerumen  Nose: Nose normal  No congestion or rhinorrhea  Mouth/Throat:      Mouth: Mucous membranes are moist       Pharynx: Oropharynx is clear  No oropharyngeal exudate or posterior oropharyngeal erythema     Eyes:      General: No scleral icterus  Right eye: No discharge  Left eye: No discharge  Extraocular Movements: Extraocular movements intact  Conjunctiva/sclera: Conjunctivae normal       Pupils: Pupils are equal, round, and reactive to light  Neck:      Vascular: No carotid bruit  Cardiovascular:      Rate and Rhythm: Normal rate and regular rhythm  Pulses: Normal pulses  Heart sounds: Normal heart sounds  No murmur heard  No friction rub  No gallop  Pulmonary:      Effort: Pulmonary effort is normal  No respiratory distress  Breath sounds: Normal breath sounds  No wheezing or rales  Abdominal:      General: Abdomen is flat  Bowel sounds are normal  There is no distension  Palpations: Abdomen is soft  There is no mass  Tenderness: There is no abdominal tenderness  There is no guarding  Hernia: No hernia is present  Musculoskeletal:         General: No swelling, tenderness, deformity or signs of injury  Normal range of motion  Cervical back: Normal range of motion and neck supple  No rigidity  No muscular tenderness  Right lower leg: No edema  Left lower leg: No edema  Lymphadenopathy:      Cervical: No cervical adenopathy  Skin:     General: Skin is warm and dry  Capillary Refill: Capillary refill takes less than 2 seconds  Coloration: Skin is not jaundiced or pale  Findings: No bruising, erythema, lesion or rash  Neurological:      General: No focal deficit present  Mental Status: He is alert and oriented to person, place, and time  Mental status is at baseline  Cranial Nerves: No cranial nerve deficit  Sensory: No sensory deficit  Motor: No weakness  Coordination: Coordination normal       Gait: Gait normal       Deep Tendon Reflexes: Reflexes normal    Psychiatric:         Mood and Affect: Mood normal          Behavior: Behavior normal          Thought Content:  Thought content normal          Judgment: Judgment normal           Shelba Meigs, MD

## 2023-01-05 DIAGNOSIS — I10 ESSENTIAL HYPERTENSION: ICD-10-CM

## 2023-01-05 RX ORDER — HYDROCHLOROTHIAZIDE 12.5 MG/1
TABLET ORAL
Qty: 31 TABLET | Refills: 0 | Status: SHIPPED | OUTPATIENT
Start: 2023-01-05

## 2023-01-06 DIAGNOSIS — E78.5 HYPERLIPIDEMIA, UNSPECIFIED HYPERLIPIDEMIA TYPE: ICD-10-CM

## 2023-01-06 RX ORDER — SIMVASTATIN 20 MG
20 TABLET ORAL
Qty: 90 TABLET | Refills: 3 | Status: SHIPPED | OUTPATIENT
Start: 2023-01-06

## 2023-01-06 NOTE — TELEPHONE ENCOUNTER
[facility] called for this patient since he can not   Refill on his medication  L 11/15/22  N 6/27/23

## 2023-01-30 ENCOUNTER — APPOINTMENT (OUTPATIENT)
Dept: LAB | Facility: IMAGING CENTER | Age: 88
End: 2023-01-30

## 2023-01-30 DIAGNOSIS — E78.5 HYPERLIPIDEMIA, UNSPECIFIED HYPERLIPIDEMIA TYPE: ICD-10-CM

## 2023-01-30 LAB
CHOLEST SERPL-MCNC: 96 MG/DL
HDLC SERPL-MCNC: 29 MG/DL
LDLC SERPL CALC-MCNC: 31 MG/DL (ref 0–100)
NONHDLC SERPL-MCNC: 67 MG/DL
TRIGL SERPL-MCNC: 179 MG/DL

## 2023-02-03 ENCOUNTER — OFFICE VISIT (OUTPATIENT)
Dept: UROLOGY | Facility: AMBULATORY SURGERY CENTER | Age: 88
End: 2023-02-03

## 2023-02-03 VITALS
WEIGHT: 169 LBS | HEART RATE: 56 BPM | HEIGHT: 70 IN | BODY MASS INDEX: 24.2 KG/M2 | OXYGEN SATURATION: 98 % | DIASTOLIC BLOOD PRESSURE: 80 MMHG | SYSTOLIC BLOOD PRESSURE: 122 MMHG

## 2023-02-03 DIAGNOSIS — R33.8 ACUTE URINARY RETENTION: ICD-10-CM

## 2023-02-03 DIAGNOSIS — N40.0 ENLARGED PROSTATE: Primary | ICD-10-CM

## 2023-02-03 LAB — POST-VOID RESIDUAL VOLUME, ML POC: 12 ML

## 2023-02-03 RX ORDER — TAMSULOSIN HYDROCHLORIDE 0.4 MG/1
0.4 CAPSULE ORAL
Qty: 90 CAPSULE | Refills: 3 | Status: SHIPPED | OUTPATIENT
Start: 2023-02-03

## 2023-02-03 RX ORDER — FINASTERIDE 5 MG/1
5 TABLET, FILM COATED ORAL DAILY
Qty: 90 TABLET | Refills: 3 | Status: SHIPPED | OUTPATIENT
Start: 2023-02-03

## 2023-02-03 NOTE — PROGRESS NOTES
02/03/23    [facility]   5/14/1932   [id number]     Assessment  1 BPH with LUTS  2 Urinary retention     Discussion/Plan  1 BPH with LUTS  2 History of urinary retention              Continue Finasteride and Tamsulosin daily              PVR 12     Return as needed  PCP to manage Rx  Subjective  HPI   Leslie Braden is a pleasant 80year old male who presents to the office for PVR  He had shin catheter placement in the ER 4/23/22  He passed void trial and has been doing well since  He lives at [place] [id number] with his wife  He is known to our practice for management of BPH  He reportedly stopped taking Tamsulosin prior to episode of retention  He was managed on Tamsulosin due to nocturia, straining and weak stream with post-void dribbling  He is now maintained on Finasteride and Tamsulosin  He has increased his water intake  He denies acute symptoms  PVR trend below  He went to pelvic floor physical therapy for incontinence and PT recommended he sit to urinate which he practices  He has occasional post-void dribbling  Component      Latest Ref Rng & Units 7/15/2019 2/12/2020 12/9/2021           1:12 PM 12:37 PM  1:37 PM   POST-VOID RESIDUAL VOLUME, ML POC      mL 74 88 30     Component      Latest Ref Rng & Units 5/4/2022 8/4/2022 2/3/2023           2:37 PM  2:18 PM  2:18 PM   POST-VOID RESIDUAL VOLUME, ML POC      mL 141 42 12       Review of Systems - History obtained from chart review and the patient  General ROS: negative  Psychological ROS: negative  Respiratory ROS: no cough, shortness of breath, or wheezing  Cardiovascular ROS: no chest pain or dyspnea on exertion  Gastrointestinal ROS: no abdominal pain, change in bowel habits, or black or bloody stools  Genito-Urinary ROS: negative  Musculoskeletal ROS: negative  Neurological ROS: negative  Dermatological ROS: negative       Objective  Physical Exam  Vitals and nursing note reviewed  Constitutional:       General: He is awake  He is not in acute distress  Appearance: Normal appearance  He is well-developed, well-groomed and normal weight  He is not ill-appearing, toxic-appearing or diaphoretic  Pulmonary:      Effort: Pulmonary effort is normal    Abdominal:      Tenderness: There is no right CVA tenderness or left CVA tenderness  Musculoskeletal:         General: Normal range of motion  Cervical back: Normal range of motion and neck supple  Skin:     General: Skin is warm  Neurological:      General: No focal deficit present  Mental Status: He is alert and oriented to person, place, and time  Mental status is at baseline  Psychiatric:         Attention and Perception: Attention normal          Mood and Affect: Mood normal          Speech: Speech normal          Behavior: Behavior normal  Behavior is cooperative  Thought Content: Thought content normal          Cognition and Memory: Cognition normal          Judgment: Judgment normal               JEFF Sandoval     I have spent 15 minutes with Patient  today in which greater than 50% of this time was spent in counseling/coordination of care regarding Intructions for management, Patient and family education, Importance of tx compliance and Risk factor reductions

## 2023-02-07 DIAGNOSIS — I10 ESSENTIAL HYPERTENSION: ICD-10-CM

## 2023-02-07 RX ORDER — AMLODIPINE BESYLATE 2.5 MG/1
TABLET ORAL
Qty: 28 TABLET | Refills: 0 | Status: SHIPPED | OUTPATIENT
Start: 2023-02-07

## 2023-02-07 RX ORDER — METOPROLOL TARTRATE 100 MG/1
TABLET ORAL
Qty: 56 TABLET | Refills: 0 | Status: SHIPPED | OUTPATIENT
Start: 2023-02-07

## 2023-04-04 ENCOUNTER — APPOINTMENT (OUTPATIENT)
Dept: LAB | Facility: IMAGING CENTER | Age: 88
End: 2023-04-04

## 2023-04-04 DIAGNOSIS — D64.9 ANEMIA, UNSPECIFIED TYPE: ICD-10-CM

## 2023-04-04 DIAGNOSIS — I10 ESSENTIAL HYPERTENSION: ICD-10-CM

## 2023-04-04 DIAGNOSIS — E78.5 DYSLIPIDEMIA: ICD-10-CM

## 2023-04-04 LAB
ALBUMIN SERPL BCP-MCNC: 3.8 G/DL (ref 3.5–5)
ALP SERPL-CCNC: 95 U/L (ref 46–116)
ALT SERPL W P-5'-P-CCNC: 32 U/L (ref 12–78)
ANION GAP SERPL CALCULATED.3IONS-SCNC: 3 MMOL/L (ref 4–13)
AST SERPL W P-5'-P-CCNC: 28 U/L (ref 5–45)
BASOPHILS # BLD AUTO: 0.02 THOUSANDS/ÂΜL (ref 0–0.1)
BASOPHILS NFR BLD AUTO: 0 % (ref 0–1)
BILIRUB SERPL-MCNC: 0.72 MG/DL (ref 0.2–1)
BUN SERPL-MCNC: 27 MG/DL (ref 5–25)
CALCIUM SERPL-MCNC: 8.3 MG/DL (ref 8.3–10.1)
CHLORIDE SERPL-SCNC: 110 MMOL/L (ref 96–108)
CHOLEST SERPL-MCNC: 90 MG/DL
CO2 SERPL-SCNC: 26 MMOL/L (ref 21–32)
CREAT SERPL-MCNC: 1.37 MG/DL (ref 0.6–1.3)
EOSINOPHIL # BLD AUTO: 0.36 THOUSAND/ÂΜL (ref 0–0.61)
EOSINOPHIL NFR BLD AUTO: 6 % (ref 0–6)
ERYTHROCYTE [DISTWIDTH] IN BLOOD BY AUTOMATED COUNT: 12 % (ref 11.6–15.1)
GFR SERPL CREATININE-BSD FRML MDRD: 45 ML/MIN/1.73SQ M
GLUCOSE P FAST SERPL-MCNC: 106 MG/DL (ref 65–99)
HCT VFR BLD AUTO: 39.9 % (ref 36.5–49.3)
HDLC SERPL-MCNC: 25 MG/DL
HGB BLD-MCNC: 13 G/DL (ref 12–17)
IMM GRANULOCYTES # BLD AUTO: 0.03 THOUSAND/UL (ref 0–0.2)
IMM GRANULOCYTES NFR BLD AUTO: 1 % (ref 0–2)
LDLC SERPL CALC-MCNC: 24 MG/DL (ref 0–100)
LYMPHOCYTES # BLD AUTO: 1.04 THOUSANDS/ÂΜL (ref 0.6–4.47)
LYMPHOCYTES NFR BLD AUTO: 18 % (ref 14–44)
MCH RBC QN AUTO: 33 PG (ref 26.8–34.3)
MCHC RBC AUTO-ENTMCNC: 32.6 G/DL (ref 31.4–37.4)
MCV RBC AUTO: 101 FL (ref 82–98)
MONOCYTES # BLD AUTO: 0.75 THOUSAND/ÂΜL (ref 0.17–1.22)
MONOCYTES NFR BLD AUTO: 13 % (ref 4–12)
NEUTROPHILS # BLD AUTO: 3.55 THOUSANDS/ÂΜL (ref 1.85–7.62)
NEUTS SEG NFR BLD AUTO: 62 % (ref 43–75)
NONHDLC SERPL-MCNC: 65 MG/DL
NRBC BLD AUTO-RTO: 0 /100 WBCS
PLATELET # BLD AUTO: 172 THOUSANDS/UL (ref 149–390)
PMV BLD AUTO: 11 FL (ref 8.9–12.7)
POTASSIUM SERPL-SCNC: 3.7 MMOL/L (ref 3.5–5.3)
PROT SERPL-MCNC: 6.6 G/DL (ref 6.4–8.4)
RBC # BLD AUTO: 3.94 MILLION/UL (ref 3.88–5.62)
SODIUM SERPL-SCNC: 139 MMOL/L (ref 135–147)
TRIGL SERPL-MCNC: 207 MG/DL
WBC # BLD AUTO: 5.75 THOUSAND/UL (ref 4.31–10.16)

## 2023-05-03 ENCOUNTER — OFFICE VISIT (OUTPATIENT)
Dept: INTERNAL MEDICINE CLINIC | Facility: OTHER | Age: 88
End: 2023-05-03

## 2023-05-03 VITALS
OXYGEN SATURATION: 96 % | TEMPERATURE: 98.3 F | RESPIRATION RATE: 18 BRPM | HEART RATE: 74 BPM | SYSTOLIC BLOOD PRESSURE: 136 MMHG | WEIGHT: 171.2 LBS | BODY MASS INDEX: 24.51 KG/M2 | DIASTOLIC BLOOD PRESSURE: 66 MMHG | HEIGHT: 70 IN

## 2023-05-03 DIAGNOSIS — I25.10 CORONARY ARTERY DISEASE INVOLVING NATIVE CORONARY ARTERY OF NATIVE HEART WITHOUT ANGINA PECTORIS: ICD-10-CM

## 2023-05-03 DIAGNOSIS — I73.9 PERIPHERAL ARTERIAL DISEASE (HCC): ICD-10-CM

## 2023-05-03 DIAGNOSIS — N13.8 BPH WITH OBSTRUCTION/LOWER URINARY TRACT SYMPTOMS: ICD-10-CM

## 2023-05-03 DIAGNOSIS — N18.30 STAGE 3 CHRONIC KIDNEY DISEASE, UNSPECIFIED WHETHER STAGE 3A OR 3B CKD (HCC): ICD-10-CM

## 2023-05-03 DIAGNOSIS — K21.9 GASTROESOPHAGEAL REFLUX DISEASE WITHOUT ESOPHAGITIS: ICD-10-CM

## 2023-05-03 DIAGNOSIS — N40.1 BPH WITH OBSTRUCTION/LOWER URINARY TRACT SYMPTOMS: ICD-10-CM

## 2023-05-03 DIAGNOSIS — E78.5 DYSLIPIDEMIA: ICD-10-CM

## 2023-05-03 DIAGNOSIS — I10 ESSENTIAL HYPERTENSION: Primary | ICD-10-CM

## 2023-05-03 RX ORDER — METOPROLOL TARTRATE 100 MG/1
100 TABLET ORAL EVERY 12 HOURS SCHEDULED
Qty: 180 TABLET | Refills: 1 | Status: SHIPPED | OUTPATIENT
Start: 2023-05-03

## 2023-05-03 RX ORDER — LISINOPRIL 10 MG/1
10 TABLET ORAL DAILY
COMMUNITY
Start: 2023-05-01

## 2023-05-03 RX ORDER — FAMOTIDINE 20 MG/1
TABLET, FILM COATED ORAL
Qty: 180 TABLET | Refills: 1 | Status: SHIPPED | OUTPATIENT
Start: 2023-05-03

## 2023-05-03 RX ORDER — ATORVASTATIN CALCIUM 20 MG/1
20 TABLET, FILM COATED ORAL DAILY
Qty: 90 TABLET | Refills: 1 | Status: SHIPPED | OUTPATIENT
Start: 2023-05-03

## 2023-05-03 RX ORDER — HYDROCHLOROTHIAZIDE 12.5 MG/1
12.5 TABLET ORAL DAILY
Qty: 90 TABLET | Refills: 1 | Status: SHIPPED | OUTPATIENT
Start: 2023-05-03

## 2023-05-03 NOTE — PROGRESS NOTES
Assessment/Plan:    Gastroesophageal reflux disease without esophagitis  We will start Pepcid 20 mg every morning and may take a second dose in the evening as needed  Essential hypertension  Controlled  Continue current antihypertensive regimen  CAD (coronary artery disease), native coronary artery  Continue statin therapy, will switch from simvastatin to atorvastatin  Stage 3 chronic kidney disease, unspecified whether stage 3a or 3b CKD ([facility] )  Lab Results   Component Value Date    EGFR 45 04/04/2023    EGFR 73 04/23/2022    EGFR 50 02/01/2022    CREATININE 1 37 (H) 04/04/2023    CREATININE 0 92 04/23/2022    CREATININE 1 25 02/01/2022   Continue to trend kidney function  Avoid nephrotoxic agents  BPH with obstruction/lower urinary tract symptoms  Symptoms stable  Continue finasteride 5 mg daily and Flomax 0 4 mg daily  Dyslipidemia  We will switch from simvastatin to atorvastatin 20 mg daily  Diagnoses and all orders for this visit:    Essential hypertension  -     hydrochlorothiazide (HYDRODIURIL) 12 5 mg tablet; Take 1 tablet (12 5 mg total) by mouth daily  -     metoprolol tartrate (LOPRESSOR) 100 mg tablet; Take 1 tablet (100 mg total) by mouth every 12 (twelve) hours    Peripheral arterial disease (HCC)    Stage 3 chronic kidney disease, unspecified whether stage 3a or 3b CKD ([facility])    Dyslipidemia  -     atorvastatin (LIPITOR) 20 mg tablet; Take 1 tablet (20 mg total) by mouth daily    Gastroesophageal reflux disease without esophagitis  -     famotidine (PEPCID) 20 mg tablet; Take 1 tablet in the morning and may take a second in the evening as needed for acid reflux  Coronary artery disease involving native coronary artery of native heart without angina pectoris    BPH with obstruction/lower urinary tract symptoms    Other orders  -     lisinopril (ZESTRIL) 10 mg tablet; Take 10 mg by mouth daily                Subjective:      Patient ID: Alberta Gross is a 80 y o  male     Chief Complaint   Patient presents with    Follow-up     6 month, labs done 4/4/23        Nothing due    bowel issues     Irregular at times       80year old male is seen today for follow up of chronic conditions  Labs reviewed, lipid shows control of total cholesterol and LDL, triglycerides remain elevated  Otherwise, CBC and CMP are stable  He has been experiencing acid reflux symptoms consisting of indigestion, abdominal cramping and diarrhea  He had similar occurrence of symptoms sometime ago which was treated with omeprazole  Otherwise, he has no other complaints or concerns at this time  He has been compliant with his medication regimen  Hypertension  This is a chronic problem  The current episode started more than 1 year ago  The problem is unchanged  The problem is controlled  Pertinent negatives include no chest pain, headaches, palpitations or shortness of breath  Past treatments include beta blockers, calcium channel blockers, diuretics and ACE inhibitors  The current treatment provides moderate improvement  There are no compliance problems  Hyperlipidemia  This is a chronic problem  The current episode started more than 1 year ago  The problem is controlled  Recent lipid tests were reviewed and are normal  Pertinent negatives include no chest pain or shortness of breath  Current antihyperlipidemic treatment includes statins  The current treatment provides moderate improvement of lipids  There are no compliance problems  Heartburn  He reports no abdominal pain, no chest pain, no choking, no coughing, no nausea, no sore throat or no wheezing  This is a recurrent problem  The problem occurs frequently  The problem has been unchanged  The symptoms are aggravated by certain foods  Pertinent negatives include no fatigue  He has tried nothing for the symptoms         The following portions of the patient's history were reviewed and updated as appropriate: allergies, current medications, past family history, past medical history, past social history, past surgical history and problem list     Review of Systems   Constitutional: Negative for activity change, appetite change, chills, diaphoresis, fatigue and fever  HENT: Negative for congestion, postnasal drip, rhinorrhea, sinus pressure, sinus pain, sneezing and sore throat  Eyes: Negative for visual disturbance  Respiratory: Negative for apnea, cough, choking, chest tightness, shortness of breath and wheezing  Cardiovascular: Negative for chest pain, palpitations and leg swelling  Gastrointestinal: Negative for abdominal distention, abdominal pain, anal bleeding, blood in stool, constipation, diarrhea, nausea and vomiting  Endocrine: Negative for cold intolerance and heat intolerance  Genitourinary: Negative for difficulty urinating, dysuria and hematuria  Musculoskeletal: Negative  Skin: Negative  Neurological: Negative for dizziness, weakness, light-headedness, numbness and headaches  Hematological: Negative for adenopathy  Psychiatric/Behavioral: Negative for agitation, sleep disturbance and suicidal ideas  All other systems reviewed and are negative          Past Medical History:   Diagnosis Date    Anemia, iron deficiency     Drug Therapy, poor absorbtion documented    Appendicitis     Arthritis 1/25/2019    Cardiac disease     Colon cancer ([facility] ) 12/2013    Resected: BREANNA    Congenital nystagmus     Left    Hyperlipidemia     Hypertension     Inguinal hernia     Sciatica 2010    resolved from problem list-2011    Stage 3 chronic kidney disease, unspecified whether stage 3a or 3b CKD ([facility] ) 5/3/2023         Current Outpatient Medications:     amLODIPine (NORVASC) 2 5 mg tablet, TAKE 1 TABLET BY MOUTH DAILY @ 9A (HTN), Disp: 28 tablet, Rfl: 0    aspirin 81 mg chewable tablet, Chew 81 mg, Disp: , Rfl:     atorvastatin (LIPITOR) 20 mg tablet, Take 1 tablet (20 mg total) by mouth daily, Disp: 90 "tablet, Rfl: 1    famotidine (PEPCID) 20 mg tablet, Take 1 tablet in the morning and may take a second in the evening as needed for acid reflux  , Disp: 180 tablet, Rfl: 1    finasteride (PROSCAR) 5 mg tablet, Take 1 tablet (5 mg total) by mouth daily, Disp: 90 tablet, Rfl: 3    folic acid (FOLVITE) 174 MCG tablet, Take 400 mcg by mouth daily, Disp: , Rfl:     hydrochlorothiazide (HYDRODIURIL) 12 5 mg tablet, Take 1 tablet (12 5 mg total) by mouth daily, Disp: 90 tablet, Rfl: 1    lisinopril (ZESTRIL) 10 mg tablet, Take 10 mg by mouth daily, Disp: , Rfl:     metoprolol tartrate (LOPRESSOR) 100 mg tablet, Take 1 tablet (100 mg total) by mouth every 12 (twelve) hours, Disp: 180 tablet, Rfl: 1    Multiple Vitamins-Minerals (MULTIVITAL-M PO), Take 1 tablet by mouth, Disp: , Rfl:     tamsulosin (FLOMAX) 0 4 mg, Take 1 capsule (0 4 mg total) by mouth daily at bedtime, Disp: 90 capsule, Rfl: 3    Current Facility-Administered Medications:     aspirin chewable tablet 324 mg, 324 mg, Oral, Daily, Becca East PA-C, 324 mg at 11/05/21 0906    No Known Allergies    Social History   Past Surgical History:   Procedure Laterality Date    APPENDECTOMY      Appendicitis    CHOLECYSTECTOMY      COLON SIGMOID RESECTION  12/2013    COLON SURGERY      CORONARY ARTERY BYPASS GRAFT      x 2    CORONARY ARTERY BYPASS GRAFT      x2    CORONARY ARTERY BYPASS GRAFT      CORONARY ARTERY BYPASS GRAFT      INGUINAL HERNIA REPAIR      SHOULDER ARTHROCENTESIS Right     sub-acromial bursa area     Family History   Problem Relation Age of Onset    Hypertension Father     Heart disease Father     Heart disease Mother     Cancer Sister     Cancer Brother        Objective:  /66 (BP Location: Left arm, Patient Position: Sitting, Cuff Size: Standard)   Pulse 74   Temp 98 3 °F (36 8 °C) (Temporal)   Resp 18   Ht 5' 10\" (1 778 m)   Wt 77 7 kg (171 lb 3 2 oz)   SpO2 96%   BMI 24 56 kg/m²     Recent Results (from the " past 1344 hour(s))   CBC and differential    Collection Time: 04/04/23  7:15 AM   Result Value Ref Range    WBC 5 75 4 31 - 10 16 Thousand/uL    RBC 3 94 3 88 - 5 62 Million/uL    Hemoglobin 13 0 12 0 - 17 0 g/dL    Hematocrit 39 9 36 5 - 49 3 %     (H) 82 - 98 fL    MCH 33 0 26 8 - 34 3 pg    MCHC 32 6 31 4 - 37 4 g/dL    RDW 12 0 11 6 - 15 1 %    MPV 11 0 8 9 - 12 7 fL    Platelets 836 973 - 616 Thousands/uL    nRBC 0 /100 WBCs    Neutrophils Relative 62 43 - 75 %    Immat GRANS % 1 0 - 2 %    Lymphocytes Relative 18 14 - 44 %    Monocytes Relative 13 (H) 4 - 12 %    Eosinophils Relative 6 0 - 6 %    Basophils Relative 0 0 - 1 %    Neutrophils Absolute 3 55 1 85 - 7 62 Thousands/µL    Immature Grans Absolute 0 03 0 00 - 0 20 Thousand/uL    Lymphocytes Absolute 1 04 0 60 - 4 47 Thousands/µL    Monocytes Absolute 0 75 0 17 - 1 22 Thousand/µL    Eosinophils Absolute 0 36 0 00 - 0 61 Thousand/µL    Basophils Absolute 0 02 0 00 - 0 10 Thousands/µL   Lipid panel    Collection Time: 04/04/23  7:15 AM   Result Value Ref Range    Cholesterol 90 See Comment mg/dL    Triglycerides 207 (H) See Comment mg/dL    HDL, Direct 25 (L) >=40 mg/dL    LDL Calculated 24 0 - 100 mg/dL    Non-HDL-Chol (CHOL-HDL) 65 mg/dl   Comprehensive metabolic panel    Collection Time: 04/04/23  7:15 AM   Result Value Ref Range    Sodium 139 135 - 147 mmol/L    Potassium 3 7 3 5 - 5 3 mmol/L    Chloride 110 (H) 96 - 108 mmol/L    CO2 26 21 - 32 mmol/L    ANION GAP 3 (L) 4 - 13 mmol/L    BUN 27 (H) 5 - 25 mg/dL    Creatinine 1 37 (H) 0 60 - 1 30 mg/dL    Glucose, Fasting 106 (H) 65 - 99 mg/dL    Calcium 8 3 8 3 - 10 1 mg/dL    AST 28 5 - 45 U/L    ALT 32 12 - 78 U/L    Alkaline Phosphatase 95 46 - 116 U/L    Total Protein 6 6 6 4 - 8 4 g/dL    Albumin 3 8 3 5 - 5 0 g/dL    Total Bilirubin 0 72 0 20 - 1 00 mg/dL    eGFR 45 ml/min/1 73sq m            Physical Exam  Vitals and nursing note reviewed     Constitutional:       General: He is not in acute distress  Appearance: He is well-developed  He is not diaphoretic  HENT:      Head: Normocephalic and atraumatic  Eyes:      General: No scleral icterus  Right eye: No discharge  Left eye: No discharge  Conjunctiva/sclera: Conjunctivae normal       Pupils: Pupils are equal, round, and reactive to light  Neck:      Thyroid: No thyromegaly  Vascular: No JVD  Cardiovascular:      Rate and Rhythm: Normal rate and regular rhythm  Heart sounds: Normal heart sounds  No murmur heard  No friction rub  No gallop  Pulmonary:      Effort: Pulmonary effort is normal  No respiratory distress  Breath sounds: Normal breath sounds  No wheezing or rales  Chest:      Chest wall: No tenderness  Abdominal:      General: Bowel sounds are normal  There is no distension  Palpations: Abdomen is soft  There is no mass  Tenderness: There is no abdominal tenderness  There is no guarding or rebound  Musculoskeletal:         General: No tenderness or deformity  Normal range of motion  Cervical back: Normal range of motion and neck supple  Lymphadenopathy:      Cervical: No cervical adenopathy  Skin:     General: Skin is warm and dry  Coloration: Skin is not pale  Findings: No erythema or rash  Neurological:      Mental Status: He is alert and oriented to person, place, and time  Cranial Nerves: No cranial nerve deficit  Coordination: Coordination normal       Deep Tendon Reflexes: Reflexes are normal and symmetric  Psychiatric:         Behavior: Behavior normal          Thought Content:  Thought content normal          Judgment: Judgment normal

## 2023-05-03 NOTE — ASSESSMENT & PLAN NOTE
Lab Results   Component Value Date    EGFR 45 04/04/2023    EGFR 73 04/23/2022    EGFR 50 02/01/2022    CREATININE 1 37 (H) 04/04/2023    CREATININE 0 92 04/23/2022    CREATININE 1 25 02/01/2022   Continue to trend kidney function  Avoid nephrotoxic agents

## 2023-05-30 ENCOUNTER — TELEPHONE (OUTPATIENT)
Dept: INTERNAL MEDICINE CLINIC | Facility: OTHER | Age: 88
End: 2023-05-30

## 2023-05-30 NOTE — TELEPHONE ENCOUNTER
[facility] patient calling to make you aware that he woke up this morning with his right eyelid slightly swollen  The nurses at [facility] told him to put wet compresses on it and he stated that is helping but he just wanted to let you know what was going on

## 2023-05-31 ENCOUNTER — OFFICE VISIT (OUTPATIENT)
Dept: INTERNAL MEDICINE CLINIC | Facility: OTHER | Age: 88
End: 2023-05-31

## 2023-05-31 VITALS
BODY MASS INDEX: 24.2 KG/M2 | TEMPERATURE: 98.4 F | HEART RATE: 63 BPM | OXYGEN SATURATION: 97 % | HEIGHT: 70 IN | WEIGHT: 169 LBS | RESPIRATION RATE: 20 BRPM | DIASTOLIC BLOOD PRESSURE: 58 MMHG | SYSTOLIC BLOOD PRESSURE: 118 MMHG

## 2023-05-31 DIAGNOSIS — L03.211 CELLULITIS OF FACE: Primary | ICD-10-CM

## 2023-05-31 RX ORDER — FAMOTIDINE 40 MG/1
TABLET, FILM COATED ORAL
COMMUNITY
Start: 2023-05-04 | End: 2023-05-31 | Stop reason: SDUPTHER

## 2023-05-31 RX ORDER — AMOXICILLIN AND CLAVULANATE POTASSIUM 875; 125 MG/1; MG/1
1 TABLET, FILM COATED ORAL EVERY 12 HOURS SCHEDULED
Qty: 14 TABLET | Refills: 0 | Status: SHIPPED | OUTPATIENT
Start: 2023-05-31 | End: 2023-06-07

## 2023-05-31 NOTE — PROGRESS NOTES
Assessment/Plan:    Cellulitis of face  We will treat with Augmentin twice a day for 7 days  He is to contact our office for worsening symptoms  Diagnoses and all orders for this visit:    Cellulitis of face  -     amoxicillin-clavulanate (AUGMENTIN) 875-125 mg per tablet; Take 1 tablet by mouth every 12 (twelve) hours for 7 days    Other orders  -     Discontinue: famotidine (PEPCID) 40 MG tablet                  Subjective:      Patient ID: Jodine Harada is a 80 y o  male  Chief Complaint   Patient presents with   • eye irritation     Had swelling in his right eye was told to use a warm compress and it helped but the cheek bones under the eyes he states are swollen    • hm     Bmi f/u plan       80year old male is seen today with concern for right upper cheek/inferior periorbital swelling  He woke up yesterday morning with the swelling  He denies any eye irritation or visual deficits  He denies any recent trauma or known bug bites  The following portions of the patient's history were reviewed and updated as appropriate: allergies, current medications, past family history, past medical history, past social history, past surgical history and problem list     Review of Systems   Constitutional: Negative for activity change, appetite change, chills, diaphoresis, fatigue and fever  HENT: Negative for congestion, postnasal drip, rhinorrhea, sinus pressure, sinus pain, sneezing and sore throat  Eyes: Negative for visual disturbance  Respiratory: Negative for apnea, cough, choking, chest tightness, shortness of breath and wheezing  Cardiovascular: Negative for chest pain, palpitations and leg swelling  Gastrointestinal: Negative for abdominal distention, abdominal pain, anal bleeding, blood in stool, constipation, diarrhea, nausea and vomiting  Endocrine: Negative for cold intolerance and heat intolerance  Genitourinary: Negative for difficulty urinating, dysuria and hematuria  Musculoskeletal: Negative  Skin: Negative  Neurological: Negative for dizziness, weakness, light-headedness, numbness and headaches  Hematological: Negative for adenopathy  Psychiatric/Behavioral: Negative for agitation, sleep disturbance and suicidal ideas  All other systems reviewed and are negative  Past Medical History:   Diagnosis Date   • Anemia, iron deficiency     Drug Therapy, poor absorbtion documented   • Appendicitis    • Arthritis 1/25/2019   • Cardiac disease    • Colon cancer ([facility] ) 12/2013    Resected: BREANNA   • Congenital nystagmus     Left   • Hyperlipidemia    • Hypertension    • Inguinal hernia    • Sciatica 2010    resolved from problem list-2011   • Stage 3 chronic kidney disease, unspecified whether stage 3a or 3b CKD ([facility] ) 5/3/2023         Current Outpatient Medications:   •  amLODIPine (NORVASC) 2 5 mg tablet, TAKE 1 TABLET BY MOUTH DAILY @ 9A (HTN), Disp: 28 tablet, Rfl: 0  •  amoxicillin-clavulanate (AUGMENTIN) 875-125 mg per tablet, Take 1 tablet by mouth every 12 (twelve) hours for 7 days, Disp: 14 tablet, Rfl: 0  •  aspirin 81 mg chewable tablet, Chew 81 mg, Disp: , Rfl:   •  atorvastatin (LIPITOR) 20 mg tablet, Take 1 tablet (20 mg total) by mouth daily, Disp: 90 tablet, Rfl: 1  •  famotidine (PEPCID) 20 mg tablet, Take 1 tablet in the morning and may take a second in the evening as needed for acid reflux  , Disp: 180 tablet, Rfl: 1  •  finasteride (PROSCAR) 5 mg tablet, Take 1 tablet (5 mg total) by mouth daily, Disp: 90 tablet, Rfl: 3  •  folic acid (FOLVITE) 276 MCG tablet, Take 400 mcg by mouth daily, Disp: , Rfl:   •  hydrochlorothiazide (HYDRODIURIL) 12 5 mg tablet, Take 1 tablet (12 5 mg total) by mouth daily, Disp: 90 tablet, Rfl: 1  •  lisinopril (ZESTRIL) 10 mg tablet, Take 10 mg by mouth daily, Disp: , Rfl:   •  metoprolol tartrate (LOPRESSOR) 100 mg tablet, Take 1 tablet (100 mg total) by mouth every 12 (twelve) hours, Disp: 180 tablet, Rfl: 1  •  Multiple "Vitamins-Minerals (MULTIVITAL-M PO), Take 1 tablet by mouth, Disp: , Rfl:   •  tamsulosin (FLOMAX) 0 4 mg, Take 1 capsule (0 4 mg total) by mouth daily at bedtime, Disp: 90 capsule, Rfl: 3    Current Facility-Administered Medications:   •  aspirin chewable tablet 324 mg, 324 mg, Oral, Daily, Becca East PA-C, 324 mg at 11/05/21 4311    No Known Allergies    Social History   Past Surgical History:   Procedure Laterality Date   • APPENDECTOMY      Appendicitis   • CHOLECYSTECTOMY     • COLON SIGMOID RESECTION  12/2013   • COLON SURGERY     • CORONARY ARTERY BYPASS GRAFT      x 2   • CORONARY ARTERY BYPASS GRAFT      x2   • CORONARY ARTERY BYPASS GRAFT     • CORONARY ARTERY BYPASS GRAFT     • INGUINAL HERNIA REPAIR     • SHOULDER ARTHROCENTESIS Right     sub-acromial bursa area     Family History   Problem Relation Age of Onset   • Hypertension Father    • Heart disease Father    • Heart disease Mother    • Cancer Sister    • Cancer Brother        Objective:  /58 (BP Location: Left arm, Patient Position: Sitting, Cuff Size: Standard)   Pulse 63   Temp 98 4 °F (36 9 °C) (Temporal)   Resp 20   Ht 5' 10\" (1 778 m)   Wt 76 7 kg (169 lb)   SpO2 97%   BMI 24 25 kg/m²     No results found for this or any previous visit (from the past 1344 hour(s))  Physical Exam  Vitals and nursing note reviewed  Constitutional:       General: He is not in acute distress  Appearance: He is well-developed  He is not diaphoretic  HENT:      Head: Normocephalic and atraumatic  Eyes:      General: No scleral icterus  Right eye: No discharge  Left eye: No discharge  Conjunctiva/sclera: Conjunctivae normal       Pupils: Pupils are equal, round, and reactive to light  Neck:      Thyroid: No thyromegaly  Vascular: No JVD  Cardiovascular:      Rate and Rhythm: Normal rate and regular rhythm  Heart sounds: Normal heart sounds  No murmur heard  No friction rub  No gallop   " Pulmonary:      Effort: Pulmonary effort is normal  No respiratory distress  Breath sounds: Normal breath sounds  No wheezing or rales  Chest:      Chest wall: No tenderness  Abdominal:      General: Bowel sounds are normal  There is no distension  Palpations: Abdomen is soft  There is no mass  Tenderness: There is no abdominal tenderness  There is no guarding or rebound  Musculoskeletal:         General: No tenderness or deformity  Normal range of motion  Cervical back: Normal range of motion and neck supple  Lymphadenopathy:      Cervical: No cervical adenopathy  Skin:     General: Skin is warm and dry  Coloration: Skin is not pale  Findings: No erythema or rash  Neurological:      Mental Status: He is alert and oriented to person, place, and time  Cranial Nerves: No cranial nerve deficit  Coordination: Coordination normal       Deep Tendon Reflexes: Reflexes are normal and symmetric  Psychiatric:         Behavior: Behavior normal          Thought Content:  Thought content normal          Judgment: Judgment normal

## 2023-05-31 NOTE — ASSESSMENT & PLAN NOTE
We will treat with Augmentin twice a day for 7 days  He is to contact our office for worsening symptoms

## 2023-06-02 ENCOUNTER — TELEPHONE (OUTPATIENT)
Dept: INTERNAL MEDICINE CLINIC | Age: 88
End: 2023-06-02

## 2023-06-02 NOTE — TELEPHONE ENCOUNTER
Patient called back and stated that he took his antibiotic with food today and it is still giving him diarrhea  He doesn't want to take it anymore and wants to know if there is a different antibiotic that could be prescribed

## 2023-06-02 NOTE — TELEPHONE ENCOUNTER
Please take antibiotic as prescribed, may take over-the-counter probiotic, please make sure patient is well-hydrated, please take antibiotic with food  If diarrhea persist call office for follow-up

## 2023-06-02 NOTE — TELEPHONE ENCOUNTER
All antibiotics have the potential side effect of diarrhea  He needs to finish full course in order for cellulitis to resolve

## 2023-06-02 NOTE — TELEPHONE ENCOUNTER
Patient called and has been having loose bowels for about a day  Pharmacy told patient it could be from Amoxicillin  Patient is asking if he should finish amoxicillin  Patient states the swelling in cheeks has gone down and is feeling better         Please advise    Thank you

## 2023-06-06 DIAGNOSIS — I10 ESSENTIAL HYPERTENSION: ICD-10-CM

## 2023-06-06 RX ORDER — AMLODIPINE BESYLATE 2.5 MG/1
TABLET ORAL
Qty: 90 TABLET | Refills: 1 | Status: SHIPPED | OUTPATIENT
Start: 2023-06-06

## 2023-08-02 ENCOUNTER — OFFICE VISIT (OUTPATIENT)
Dept: INTERNAL MEDICINE CLINIC | Age: 88
End: 2023-08-02
Payer: MEDICARE

## 2023-08-02 VITALS
TEMPERATURE: 97.5 F | BODY MASS INDEX: 24.34 KG/M2 | OXYGEN SATURATION: 96 % | HEIGHT: 70 IN | WEIGHT: 170 LBS | HEART RATE: 54 BPM | SYSTOLIC BLOOD PRESSURE: 102 MMHG | DIASTOLIC BLOOD PRESSURE: 56 MMHG

## 2023-08-02 DIAGNOSIS — R05.1 ACUTE COUGH: ICD-10-CM

## 2023-08-02 DIAGNOSIS — J06.9 VIRAL UPPER RESPIRATORY TRACT INFECTION: Primary | ICD-10-CM

## 2023-08-02 PROCEDURE — 99213 OFFICE O/P EST LOW 20 MIN: CPT | Performed by: PHYSICIAN ASSISTANT

## 2023-08-02 RX ORDER — BENZONATATE 100 MG/1
100 CAPSULE ORAL 3 TIMES DAILY PRN
Qty: 20 CAPSULE | Refills: 0 | Status: SHIPPED | OUTPATIENT
Start: 2023-08-02

## 2023-08-02 RX ORDER — LEVOCETIRIZINE DIHYDROCHLORIDE 5 MG/1
5 TABLET, FILM COATED ORAL EVERY EVENING
Qty: 7 TABLET | Refills: 0 | Status: SHIPPED | OUTPATIENT
Start: 2023-08-02

## 2023-08-02 NOTE — PROGRESS NOTES
Assessment/Plan:         Diagnoses and all orders for this visit:    Viral upper respiratory tract infection  Comments:  increase fluids, rest  gargle with salt water  +daily antihistamine  Orders:  -     levocetirizine (XYZAL) 5 MG tablet; Take 1 tablet (5 mg total) by mouth every evening    Acute cough  Comments:  tessalon prn  f/u if cough persists, would suggest CXR if not improving  Orders:  -     benzonatate (TESSALON PERLES) 100 mg capsule; Take 1 capsule (100 mg total) by mouth 3 (three) times a day as needed for cough  -     levocetirizine (XYZAL) 5 MG tablet; Take 1 tablet (5 mg total) by mouth every evening          Subjective:      Patient ID: Skippy Caller is a 80 y.o. male. 81 y/o male c/o cough, nasal congestion "stuffiness" x 4-5 days   Pt states cough seems to be improved today, mostly dry cough  No chest tightness, sob, wheezing    Took tylenol and vicks vapo rub   Denies fever, chills  Appetite is good   Denies sick contacts           The following portions of the patient's history were reviewed and updated as appropriate: allergies, current medications, past family history, past medical history, past social history, past surgical history and problem list.    Review of Systems   Constitutional: Negative for appetite change, chills, fatigue and fever. HENT: Positive for congestion, sinus pressure and sinus pain. Negative for sore throat and trouble swallowing. Eyes: Negative for pain and redness. Respiratory: Positive for cough. Negative for chest tightness, shortness of breath and wheezing. Cardiovascular: Negative for chest pain and leg swelling. Gastrointestinal: Negative for abdominal pain, constipation, diarrhea and nausea. Musculoskeletal: Negative for arthralgias and back pain. Skin: Negative for rash. Neurological: Negative for dizziness and headaches. Psychiatric/Behavioral: Negative for sleep disturbance. The patient is not nervous/anxious.           Past Medical History:   Diagnosis Date   • Anemia, iron deficiency     Drug Therapy, poor absorbtion documented   • Appendicitis    • Arthritis 1/25/2019   • Cardiac disease    • Colon cancer ([address] [facility]) 12/2013    Resected: BREANNA   • Congenital nystagmus     Left   • Hyperlipidemia    • Hypertension    • Inguinal hernia    • Sciatica 2010    resolved from problem list-2011   • Stage 3 chronic kidney disease, unspecified whether stage 3a or 3b CKD ([address]) 5/3/2023         Current Outpatient Medications:   •  amLODIPine (NORVASC) 2.5 mg tablet, TAKE 1 TABLET BY MOUTH DAILY @ 9A (HTN), Disp: 90 tablet, Rfl: 1  •  aspirin 81 mg chewable tablet, Chew 81 mg, Disp: , Rfl:   •  atorvastatin (LIPITOR) 20 mg tablet, Take 1 tablet (20 mg total) by mouth daily, Disp: 90 tablet, Rfl: 1  •  benzonatate (TESSALON PERLES) 100 mg capsule, Take 1 capsule (100 mg total) by mouth 3 (three) times a day as needed for cough, Disp: 20 capsule, Rfl: 0  •  famotidine (PEPCID) 20 mg tablet, Take 1 tablet in the morning and may take a second in the evening as needed for acid reflux. , Disp: 180 tablet, Rfl: 1  •  finasteride (PROSCAR) 5 mg tablet, Take 1 tablet (5 mg total) by mouth daily, Disp: 90 tablet, Rfl: 3  •  folic acid (FOLVITE) 316 MCG tablet, Take 400 mcg by mouth daily, Disp: , Rfl:   •  hydrochlorothiazide (HYDRODIURIL) 12.5 mg tablet, Take 1 tablet (12.5 mg total) by mouth daily, Disp: 90 tablet, Rfl: 1  •  levocetirizine (XYZAL) 5 MG tablet, Take 1 tablet (5 mg total) by mouth every evening, Disp: 7 tablet, Rfl: 0  •  lisinopril (ZESTRIL) 10 mg tablet, Take 10 mg by mouth daily, Disp: , Rfl:   •  metoprolol tartrate (LOPRESSOR) 100 mg tablet, Take 1 tablet (100 mg total) by mouth every 12 (twelve) hours, Disp: 180 tablet, Rfl: 1  •  Multiple Vitamins-Minerals (MULTIVITAL-M PO), Take 1 tablet by mouth, Disp: , Rfl:   •  tamsulosin (FLOMAX) 0.4 mg, Take 1 capsule (0.4 mg total) by mouth daily at bedtime, Disp: 90 capsule, Rfl: 3    Current Facility-Administered Medications:   •  aspirin chewable tablet 324 mg, 324 mg, Oral, Daily, Becca East PA-C, 324 mg at 11/05/21 9181    No Known Allergies    Social History   Past Surgical History:   Procedure Laterality Date   • APPENDECTOMY      Appendicitis   • CHOLECYSTECTOMY     • COLON SIGMOID RESECTION  12/2013   • COLON SURGERY     • CORONARY ARTERY BYPASS GRAFT      x 2   • CORONARY ARTERY BYPASS GRAFT      x2   • CORONARY ARTERY BYPASS GRAFT     • CORONARY ARTERY BYPASS GRAFT     • INGUINAL HERNIA REPAIR     • SHOULDER ARTHROCENTESIS Right     sub-acromial bursa area     Family History   Problem Relation Age of Onset   • Hypertension Father    • Heart disease Father    • Heart disease Mother    • Cancer Sister    • Cancer Brother        Objective:  /56 (BP Location: Left arm, Patient Position: Sitting, Cuff Size: Standard)   Pulse (!) 54   Temp 97.5 °F (36.4 °C) (Temporal)   Ht 5' 10" (1.778 m)   Wt 77.1 kg (170 lb)   SpO2 96%   BMI 24.39 kg/m²        Physical Exam  Vitals reviewed. Constitutional:       General: He is not in acute distress. HENT:      Head: Normocephalic and atraumatic. Right Ear: Tympanic membrane and ear canal normal. There is no impacted cerumen. Left Ear: Tympanic membrane and ear canal normal. There is no impacted cerumen. Ears:      Comments: L middle ear fluid      Nose: Nose normal.   Eyes:      General:         Right eye: No discharge. Left eye: No discharge. Extraocular Movements: Extraocular movements intact. Conjunctiva/sclera: Conjunctivae normal.      Pupils: Pupils are equal, round, and reactive to light. Cardiovascular:      Rate and Rhythm: Normal rate and regular rhythm. Pulmonary:      Effort: Pulmonary effort is normal.      Breath sounds: Normal breath sounds. No wheezing or rales. Abdominal:      General: Bowel sounds are normal.   Musculoskeletal:         General: Normal range of motion.    Neurological: General: No focal deficit present. Mental Status: He is alert and oriented to person, place, and time.    Psychiatric:         Mood and Affect: Mood normal.         Behavior: Behavior normal.

## 2023-11-01 DIAGNOSIS — E78.5 DYSLIPIDEMIA: ICD-10-CM

## 2023-11-01 RX ORDER — ATORVASTATIN CALCIUM 20 MG/1
TABLET, FILM COATED ORAL
Qty: 30 TABLET | Refills: 5 | Status: SHIPPED | OUTPATIENT
Start: 2023-11-01

## 2023-11-03 DIAGNOSIS — I10 ESSENTIAL HYPERTENSION: ICD-10-CM

## 2023-11-03 RX ORDER — METOPROLOL TARTRATE 100 MG/1
100 TABLET ORAL EVERY 12 HOURS SCHEDULED
Qty: 180 TABLET | Refills: 1 | Status: SHIPPED | OUTPATIENT
Start: 2023-11-03

## 2023-11-14 DIAGNOSIS — K21.9 GASTROESOPHAGEAL REFLUX DISEASE WITHOUT ESOPHAGITIS: ICD-10-CM

## 2023-11-14 RX ORDER — FAMOTIDINE 20 MG/1
20 TABLET, FILM COATED ORAL 2 TIMES DAILY PRN
Qty: 60 TABLET | Refills: 5 | Status: SHIPPED | OUTPATIENT
Start: 2023-11-14

## 2023-11-14 NOTE — TELEPHONE ENCOUNTER
Patient called needing a refill for the selected medication sent to [facility], [facility] [address]: 1/10/24

## 2023-11-17 DIAGNOSIS — I10 ESSENTIAL HYPERTENSION: ICD-10-CM

## 2023-11-17 RX ORDER — HYDROCHLOROTHIAZIDE 12.5 MG/1
TABLET ORAL
Qty: 30 TABLET | Refills: 5 | Status: SHIPPED | OUTPATIENT
Start: 2023-11-17

## 2023-12-07 DIAGNOSIS — I10 ESSENTIAL HYPERTENSION: ICD-10-CM

## 2023-12-07 RX ORDER — AMLODIPINE BESYLATE 2.5 MG/1
TABLET ORAL
Qty: 30 TABLET | Refills: 5 | Status: SHIPPED | OUTPATIENT
Start: 2023-12-07

## 2023-12-27 DIAGNOSIS — I10 ESSENTIAL HYPERTENSION: Primary | ICD-10-CM

## 2023-12-27 RX ORDER — LISINOPRIL 10 MG/1
10 TABLET ORAL DAILY
Qty: 90 TABLET | Refills: 1 | Status: SHIPPED | OUTPATIENT
Start: 2023-12-27

## 2024-01-10 ENCOUNTER — OFFICE VISIT (OUTPATIENT)
Dept: INTERNAL MEDICINE CLINIC | Facility: OTHER | Age: 89
End: 2024-01-10
Payer: MEDICARE

## 2024-01-10 VITALS
WEIGHT: 170 LBS | BODY MASS INDEX: 24.34 KG/M2 | SYSTOLIC BLOOD PRESSURE: 108 MMHG | TEMPERATURE: 98.4 F | DIASTOLIC BLOOD PRESSURE: 70 MMHG | OXYGEN SATURATION: 99 % | HEIGHT: 70 IN | RESPIRATION RATE: 20 BRPM | HEART RATE: 88 BPM

## 2024-01-10 DIAGNOSIS — N13.8 BPH WITH OBSTRUCTION/LOWER URINARY TRACT SYMPTOMS: ICD-10-CM

## 2024-01-10 DIAGNOSIS — E78.5 DYSLIPIDEMIA: Primary | ICD-10-CM

## 2024-01-10 DIAGNOSIS — N18.30 STAGE 3 CHRONIC KIDNEY DISEASE, UNSPECIFIED WHETHER STAGE 3A OR 3B CKD (HCC): ICD-10-CM

## 2024-01-10 DIAGNOSIS — I10 ESSENTIAL HYPERTENSION: ICD-10-CM

## 2024-01-10 DIAGNOSIS — N40.1 BPH WITH OBSTRUCTION/LOWER URINARY TRACT SYMPTOMS: ICD-10-CM

## 2024-01-10 DIAGNOSIS — Z71.89 ADVANCED CARE PLANNING/COUNSELING DISCUSSION: ICD-10-CM

## 2024-01-10 DIAGNOSIS — K21.9 GASTROESOPHAGEAL REFLUX DISEASE WITHOUT ESOPHAGITIS: Chronic | ICD-10-CM

## 2024-01-10 DIAGNOSIS — I73.9 PERIPHERAL ARTERIAL DISEASE (HCC): ICD-10-CM

## 2024-01-10 DIAGNOSIS — Z00.00 MEDICARE ANNUAL WELLNESS VISIT, SUBSEQUENT: ICD-10-CM

## 2024-01-10 DIAGNOSIS — Z13.1 SCREENING FOR DIABETES MELLITUS: ICD-10-CM

## 2024-01-10 DIAGNOSIS — Z13.6 SCREENING FOR CARDIOVASCULAR CONDITION: ICD-10-CM

## 2024-01-10 PROBLEM — L03.211 CELLULITIS OF FACE: Status: RESOLVED | Noted: 2023-05-31 | Resolved: 2024-01-10

## 2024-01-10 PROCEDURE — 99214 OFFICE O/P EST MOD 30 MIN: CPT | Performed by: INTERNAL MEDICINE

## 2024-01-10 PROCEDURE — G0444 DEPRESSION SCREEN ANNUAL: HCPCS | Performed by: INTERNAL MEDICINE

## 2024-01-10 PROCEDURE — 99497 ADVNCD CARE PLAN 30 MIN: CPT | Performed by: INTERNAL MEDICINE

## 2024-01-10 PROCEDURE — G0439 PPPS, SUBSEQ VISIT: HCPCS | Performed by: INTERNAL MEDICINE

## 2024-01-10 RX ORDER — FAMOTIDINE 40 MG/1
40 TABLET, FILM COATED ORAL DAILY
COMMUNITY
Start: 2023-11-15 | End: 2024-01-11 | Stop reason: SDUPTHER

## 2024-01-10 RX ORDER — CILOSTAZOL 50 MG/1
50 TABLET ORAL 2 TIMES DAILY
COMMUNITY

## 2024-01-10 NOTE — ASSESSMENT & PLAN NOTE
Jameel Benton and I had a thorough discussion regarding advance care planning.  he  has a Living Will at home to which I recommended he provide a copy to be scanned for his medical records.  ACP documentation provided to patient today.  he  understands that today's visit is a billable service.  Refer to ACP note.

## 2024-01-10 NOTE — PROGRESS NOTES
Assessment and Plan:     Problem List Items Addressed This Visit        Digestive    Gastroesophageal reflux disease without esophagitis (Chronic)     Controlled, continue famotidine.          Relevant Medications    famotidine (PEPCID) 40 MG tablet       Cardiovascular and Mediastinum    Essential hypertension     Well controlled, continue current antihypertensive regimen.          Relevant Orders    CBC    Comprehensive metabolic panel    Lipid panel    Peripheral arterial disease (HCC)       Genitourinary    BPH with obstruction/lower urinary tract symptoms     Controlled. Continue flomax.          Stage 3 chronic kidney disease, unspecified whether stage 3a or 3b CKD (HCC)     Lab Results   Component Value Date    EGFR 45 04/04/2023    EGFR 73 04/23/2022    EGFR 50 02/01/2022    CREATININE 1.37 (H) 04/04/2023    CREATININE 0.92 04/23/2022    CREATININE 1.25 02/01/2022   Continue to trend kidney function.          Relevant Orders    CBC    Comprehensive metabolic panel    Lipid panel       Other    Dyslipidemia - Primary     Continue atorvastatin.          Relevant Orders    CBC    Comprehensive metabolic panel    Lipid panel    Advanced care planning/counseling discussion     Jameel Benton and I had a thorough discussion regarding advance care planning.  he  has a Living Will at home to which I recommended he provide a copy to be scanned for his medical records.  ACP documentation provided to patient today.  he  understands that today's visit is a billable service.  Refer to ACP note.          Other Visit Diagnoses     Medicare annual wellness visit, subsequent        Screening for diabetes mellitus        Screening for cardiovascular condition            Serious Illness Conversation    1. What is your understanding now of where you are with your illness?  Prognostic Understanding: appropriate understanding of prognosis     2. How much information about what is likely to be ahead with your illness would you  like to have?  Information: patient wants to be fully informed     3. What did you (clinician) communicate to the patient?  Prognostic Communication: Uncertain - It can be difficult to predict what will happen with your illness. I hope you will continue to live well for a long time but I’m worried that you could get sick quickly, and I think it is important to prepare for that possibility.     4. If your health situation worsens, what are your most important goals?  Goals: have my medical decisions respected, be mentally aware, be at home, be emotionally at peace, be spiritually and emotionally at peace, not be a burden     5. What are the biggest fears and worries about the future and your health?  Fears/Worries: loss of control, being a financial burden, being a physical burden, burdening others     6. What abilities are so critical to your life that you cannot imagine living without them?  Unacceptable Function: being unconscious, not being myself, being unable to interact with others, being in pain or very uncomfortable, being chronically confused or not being myself, being in chronic severe pain, being unable to communicate effectively, being unable to talk, not being able to care for myself, including toileting and feeding     7. What gives you strength as you think about the future with your illness?     8. If you become sicker, how much are you willing to go through for the possibility of gaining more time?  Be in the hospital: Yes Have a feeding tube: Yes   Be in the ICU: Yes Live in a nursing home: Yes   Be on a ventilator: Yes Be uncomfortable: Yes   Be on dialysis: Yes Undergo aggressive test and/or procedures: No   9. How much does your proxy and family know about your priorities and wishes?  Discussion Discussion: extensive discussion with family about goals and wishes        How does this plan sound to you? I will do everything I can to help you through this.  Patient verbalized understanding of the  plan     I have spent 16 minutes speaking with my patient on advanced care planning today or during this visit     Advanced directives  Five Wishes: Patient does not have Five Wishes- would like information           Depression Screening and Follow-up Plan: Patient was screened for depression during today's encounter. They screened negative with a PHQ-2 score of 0.      Preventive health issues were discussed with patient, and age appropriate screening tests were ordered as noted in patient's After Visit Summary.  Personalized health advice and appropriate referrals for health education or preventive services given if needed, as noted in patient's After Visit Summary.     History of Present Illness:     Patient presents for a Medicare Wellness Visit    Jameel Benton is seen today for follow up of chronic conditions.   Recent laboratory studies reviewed today with the patient.   he has been compliant with his medication regimen.     he has no complaints or concerns at this time.       Hypertension  This is a chronic problem. The current episode started more than 1 year ago. The problem is unchanged. The problem is controlled. Pertinent negatives include no chest pain, headaches, palpitations or shortness of breath. Past treatments include calcium channel blockers, diuretics, ACE inhibitors and beta blockers. The current treatment provides moderate improvement. There are no compliance problems.    Heartburn  He reports no abdominal pain, no chest pain, no choking, no coughing, no nausea, no sore throat or no wheezing. This is a chronic problem. The current episode started more than 1 year ago. The problem occurs occasionally. The symptoms are aggravated by certain foods. Pertinent negatives include no fatigue. He has tried a PPI for the symptoms. The treatment provided moderate relief.   Hyperlipidemia  This is a chronic problem. The current episode started more than 1 year ago. The problem is controlled. Recent  lipid tests were reviewed and are normal. Pertinent negatives include no chest pain or shortness of breath. Current antihyperlipidemic treatment includes statins. The current treatment provides moderate improvement of lipids. There are no compliance problems.       Patient Care Team:  Jose Iniguez MD as PCP - General (Internal Medicine)     Review of Systems:     Review of Systems   Constitutional:  Negative for activity change, appetite change, chills, diaphoresis, fatigue and fever.   HENT:  Negative for congestion, postnasal drip, rhinorrhea, sinus pressure, sinus pain, sneezing and sore throat.    Eyes:  Negative for visual disturbance.   Respiratory:  Negative for apnea, cough, choking, chest tightness, shortness of breath and wheezing.    Cardiovascular:  Negative for chest pain, palpitations and leg swelling.   Gastrointestinal:  Negative for abdominal distention, abdominal pain, anal bleeding, blood in stool, constipation, diarrhea, nausea and vomiting.   Endocrine: Negative for cold intolerance and heat intolerance.   Genitourinary:  Negative for difficulty urinating, dysuria and hematuria.   Musculoskeletal: Negative.    Skin: Negative.    Neurological:  Negative for dizziness, weakness, light-headedness, numbness and headaches.   Hematological:  Negative for adenopathy.   Psychiatric/Behavioral:  Negative for agitation, sleep disturbance and suicidal ideas.    All other systems reviewed and are negative.       Problem List:     Patient Active Problem List   Diagnosis   • CAD (coronary artery disease), native coronary artery   • Dyslipidemia   • History of colon cancer   • Skin lesion   • Arthritis   • BPH with obstruction/lower urinary tract symptoms   • Gastroesophageal reflux disease without esophagitis   • Essential hypertension   • Advanced care planning/counseling discussion   • Peripheral arterial disease (HCC)   • Stage 3 chronic kidney disease, unspecified whether stage 3a or 3b CKD (HCC)       Past Medical and Surgical History:     Past Medical History:   Diagnosis Date   • Anemia, iron deficiency     Drug Therapy, poor absorbtion documented   • Appendicitis    • Arthritis 01/25/2019   • Cardiac disease    • Colon cancer (HCC) 12/2013    Resected: BREANNA   • Congenital nystagmus     Left   • Gastroesophageal reflux disease without esophagitis 06/19/2020   • Hyperlipidemia    • Hypertension    • Inguinal hernia    • Sciatica 2010    resolved from problem list-2011   • Stage 3 chronic kidney disease, unspecified whether stage 3a or 3b CKD (HCC) 05/03/2023     Past Surgical History:   Procedure Laterality Date   • APPENDECTOMY      Appendicitis   • CHOLECYSTECTOMY     • COLON SIGMOID RESECTION  12/2013   • COLON SURGERY     • CORONARY ARTERY BYPASS GRAFT      x 2   • CORONARY ARTERY BYPASS GRAFT      x2   • CORONARY ARTERY BYPASS GRAFT     • CORONARY ARTERY BYPASS GRAFT     • INGUINAL HERNIA REPAIR     • SHOULDER ARTHROCENTESIS Right     sub-acromial bursa area      Family History:     Family History   Problem Relation Age of Onset   • Hypertension Father    • Heart disease Father    • Heart disease Mother    • Cancer Sister    • Cancer Brother       Social History:     Social History     Socioeconomic History   • Marital status: /Civil Union     Spouse name: None   • Number of children: None   • Years of education: None   • Highest education level: None   Occupational History   • Occupation: Retired     Comment: Volunteer   Tobacco Use   • Smoking status: Never   • Smokeless tobacco: Never   Vaping Use   • Vaping status: Never Used   Substance and Sexual Activity   • Alcohol use: No   • Drug use: No   • Sexual activity: None   Other Topics Concern   • None   Social History Narrative   • None     Social Determinants of Health     Financial Resource Strain: Low Risk  (1/10/2024)    Overall Financial Resource Strain (CARDIA)    • Difficulty of Paying Living Expenses: Not hard at all   Food Insecurity: Not  on file   Transportation Needs: No Transportation Needs (1/10/2024)    PRAPARE - Transportation    • Lack of Transportation (Medical): No    • Lack of Transportation (Non-Medical): No   Physical Activity: Not on file   Stress: Not on file   Social Connections: Not on file   Intimate Partner Violence: Not on file   Housing Stability: Not on file      Medications and Allergies:     Current Outpatient Medications   Medication Sig Dispense Refill   • amLODIPine (NORVASC) 2.5 mg tablet TAKE 1 TABLET BY MOUTH DAILY @ 9A (HTN) 30 tablet 5   • aspirin 81 mg chewable tablet Chew 81 mg     • atorvastatin (LIPITOR) 20 mg tablet TAKE 1 TABLET ONCE A DAY @ 9PM.(CHOLESTEROL) 30 tablet 5   • cilostazol (PLETAL) 50 mg tablet Take 50 mg by mouth 2 (two) times a day     • famotidine (PEPCID) 40 MG tablet Take 40 mg by mouth daily     • finasteride (PROSCAR) 5 mg tablet Take 1 tablet (5 mg total) by mouth daily 90 tablet 3   • folic acid (FOLVITE) 800 MCG tablet Take 400 mcg by mouth daily     • hydrochlorothiazide (HYDRODIURIL) 12.5 mg tablet TAKE 1 TABLET BY MOUTH DAILY @ 9A (HTN) 30 tablet 5   • lisinopril (ZESTRIL) 10 mg tablet Take 1 tablet (10 mg total) by mouth daily 90 tablet 1   • metoprolol tartrate (LOPRESSOR) 100 mg tablet Take 1 tablet (100 mg total) by mouth every 12 (twelve) hours 180 tablet 1   • Multiple Vitamins-Minerals (MULTIVITAL-M PO) Take 1 tablet by mouth     • tamsulosin (FLOMAX) 0.4 mg Take 1 capsule (0.4 mg total) by mouth daily at bedtime 90 capsule 3   • levocetirizine (XYZAL) 5 MG tablet Take 1 tablet (5 mg total) by mouth every evening 7 tablet 0     Current Facility-Administered Medications   Medication Dose Route Frequency Provider Last Rate Last Admin   • aspirin chewable tablet 324 mg  324 mg Oral Daily Becca East PA-C   324 mg at 11/05/21 0906     No Known Allergies   Immunizations:     Immunization History   Administered Date(s) Administered   • COVID-19 Moderna mRNA Vaccine 12 Yr+ 50  mcg/0.5 mL (Spikevax) 11/20/2023   • COVID-19 PFIZER VACCINE 0.3 ML IM 01/09/2021, 01/28/2021, 12/10/2021, 06/08/2022   • H1N1, All Formulations 12/23/2009   • INFLUENZA 10/01/2015, 10/24/2018, 11/01/2019, 11/17/2021   • Influenza, high dose seasonal 0.7 mL 10/24/2018, 11/01/2019, 10/15/2020, 11/17/2021, 10/24/2022, 10/16/2023   • Influenza, seasonal, injectable 11/15/2009, 11/07/2011, 10/15/2012   • Pneumococcal Conjugate 13-Valent 03/17/2015   • Pneumococcal Polysaccharide PPV23 01/15/1997   • Tdap 09/12/2018   • Tuberculin Skin Test-PPD Intradermal 09/15/2006      Health Maintenance:     There are no preventive care reminders to display for this patient.  There are no preventive care reminders to display for this patient.     Medicare Screening Tests and Risk Assessments:     Jameel is here for his Subsequent Wellness visit. Last Medicare Wellness visit information reviewed, patient interviewed and updates made to the record today.      Health Risk Assessment:   Patient rates overall health as good. Patient feels that their physical health rating is same. Patient is satisfied with their life. Eyesight was rated as same. Hearing was rated as same. Patient feels that their emotional and mental health rating is slightly better. Patients states they are never, rarely angry. Patient states they are sometimes unusually tired/fatigued. Pain experienced in the last 7 days has been none. Patient states that he has experienced no weight loss or gain in last 6 months.     Depression Screening:   PHQ-2 Score: 0      Fall Risk Screening:   In the past year, patient has experienced: no history of falling in past year      Home Safety:  Patient has trouble with stairs inside or outside of their home. Patient has working smoke alarms and has working carbon monoxide detector. Home safety hazards include: none.     Nutrition:   Current diet is Regular.     Medications:   Patient is currently taking over-the-counter supplements. OTC  medications include: see medication list. Patient is able to manage medications.     Activities of Daily Living (ADLs)/Instrumental Activities of Daily Living (IADLs):   Walk and transfer into and out of bed and chair?: Yes  Dress and groom yourself?: Yes    Bathe or shower yourself?: Yes    Feed yourself? Yes  Do your laundry/housekeeping?: Yes  Manage your money, pay your bills and track your expenses?: Yes  Make your own meals?: No    Do your own shopping?: No    Previous Hospitalizations:   Any hospitalizations or ED visits within the last 12 months?: No      Advance Care Planning:   Living will: Yes    Durable POA for healthcare: Yes    Advanced directive: Yes    Advanced directive counseling given: Yes    End of Life Decisions reviewed with patient: Yes    Provider agrees with end of life decisions: Yes      Comments: Refer to ACP note.    Cognitive Screening:   Provider or family/friend/caregiver concerned regarding cognition?: No    PREVENTIVE SCREENINGS      Cardiovascular Screening:    General: Screening Not Indicated, History Lipid Disorder, Risks and Benefits Discussed and Screening Current    Due for: Lipid Panel      Diabetes Screening:     General: Screening Current and Risks and Benefits Discussed    Due for: Blood Glucose      Colorectal Cancer Screening:     General: Screening Not Indicated, History Colorectal Cancer and Risks and Benefits Discussed      Prostate Cancer Screening:    General: Screening Not Indicated and Risks and Benefits Discussed      Osteoporosis Screening:    General: Screening Not Indicated      Abdominal Aortic Aneurysm (AAA) Screening:        General: Screening Not Indicated      Lung Cancer Screening:     General: Screening Not Indicated      Hepatitis C Screening:    General: Risks and Benefits Discussed and Screening Current    Screening, Brief Intervention, and Referral to Treatment (SBIRT)    Screening  Typical number of drinks in a day: 0  Typical number of drinks in a  "week: 0  Interpretation: Low risk drinking behavior.    Single Item Drug Screening:  How often have you used an illegal drug (including marijuana) or a prescription medication for non-medical reasons in the past year? never    Single Item Drug Screen Score: 0  Interpretation: Negative screen for possible drug use disorder    Brief Intervention  Alcohol & drug use screenings were reviewed. No concerns regarding substance use disorder identified.     Annual Depression Screening  Time spent screening and evaluating the patient for depression during today's encounter was 5 minutes.    Other Counseling Topics:   Car/seat belt/driving safety, skin self-exam, sunscreen and calcium and vitamin D intake and regular weightbearing exercise.     No results found.     Physical Exam:     /70 (BP Location: Left arm, Patient Position: Sitting, Cuff Size: Standard)   Pulse 88   Temp 98.4 °F (36.9 °C) (Temporal)   Resp 20   Ht 5' 10\" (1.778 m)   Wt 77.1 kg (170 lb)   SpO2 99%   BMI 24.39 kg/m²     Physical Exam  Vitals and nursing note reviewed.   Constitutional:       General: He is not in acute distress.     Appearance: Normal appearance. He is normal weight. He is not ill-appearing, toxic-appearing or diaphoretic.   HENT:      Head: Normocephalic and atraumatic.      Right Ear: Tympanic membrane, ear canal and external ear normal. There is no impacted cerumen.      Left Ear: Tympanic membrane, ear canal and external ear normal. There is no impacted cerumen.      Nose: Nose normal. No congestion or rhinorrhea.      Mouth/Throat:      Mouth: Mucous membranes are moist.      Pharynx: Oropharynx is clear. No oropharyngeal exudate or posterior oropharyngeal erythema.   Eyes:      General: No scleral icterus.        Right eye: No discharge.         Left eye: No discharge.      Extraocular Movements: Extraocular movements intact.      Conjunctiva/sclera: Conjunctivae normal.      Pupils: Pupils are equal, round, and reactive " to light.   Neck:      Vascular: No carotid bruit.   Cardiovascular:      Rate and Rhythm: Normal rate and regular rhythm.      Pulses: Normal pulses.      Heart sounds: Normal heart sounds. No murmur heard.     No friction rub. No gallop.   Pulmonary:      Effort: Pulmonary effort is normal. No respiratory distress.      Breath sounds: Normal breath sounds. No wheezing or rales.   Abdominal:      General: Abdomen is flat. Bowel sounds are normal. There is no distension.      Palpations: Abdomen is soft. There is no mass.      Tenderness: There is no abdominal tenderness. There is no guarding.      Hernia: No hernia is present.   Musculoskeletal:         General: No swelling, tenderness, deformity or signs of injury. Normal range of motion.      Cervical back: Normal range of motion and neck supple. No rigidity. No muscular tenderness.      Right lower leg: No edema.      Left lower leg: No edema.   Lymphadenopathy:      Cervical: No cervical adenopathy.   Skin:     General: Skin is warm and dry.      Capillary Refill: Capillary refill takes less than 2 seconds.      Coloration: Skin is not jaundiced or pale.      Findings: No bruising, erythema, lesion or rash.   Neurological:      General: No focal deficit present.      Mental Status: He is alert and oriented to person, place, and time. Mental status is at baseline.      Cranial Nerves: No cranial nerve deficit.      Sensory: No sensory deficit.      Motor: No weakness.      Coordination: Coordination normal.      Gait: Gait normal.      Deep Tendon Reflexes: Reflexes normal.   Psychiatric:         Mood and Affect: Mood normal.         Behavior: Behavior normal.         Thought Content: Thought content normal.         Judgment: Judgment normal.          Jose Iniguez MD

## 2024-01-10 NOTE — ASSESSMENT & PLAN NOTE
Lab Results   Component Value Date    EGFR 45 04/04/2023    EGFR 73 04/23/2022    EGFR 50 02/01/2022    CREATININE 1.37 (H) 04/04/2023    CREATININE 0.92 04/23/2022    CREATININE 1.25 02/01/2022   Continue to trend kidney function.

## 2024-01-10 NOTE — PATIENT INSTRUCTIONS
Medicare Preventive Visit Patient Instructions  Thank you for completing your Welcome to Medicare Visit or Medicare Annual Wellness Visit today. Your next wellness visit will be due in one year (1/10/2025).  The screening/preventive services that you may require over the next 5-10 years are detailed below. Some tests may not apply to you based off risk factors and/or age. Screening tests ordered at today's visit but not completed yet may show as past due. Also, please note that scanned in results may not display below.  Preventive Screenings:  Service Recommendations Previous Testing/Comments   Colorectal Cancer Screening  Colonoscopy    Fecal Occult Blood Test (FOBT)/Fecal Immunochemical Test (FIT)  Fecal DNA/Cologuard Test  Flexible Sigmoidoscopy Age: 45-75 years old   Colonoscopy: every 10 years (May be performed more frequently if at higher risk)  OR  FOBT/FIT: every 1 year  OR  Cologuard: every 3 years  OR  Sigmoidoscopy: every 5 years  Screening may be recommended earlier than age 45 if at higher risk for colorectal cancer. Also, an individualized decision between you and your healthcare provider will decide whether screening between the ages of 76-85 would be appropriate. Colonoscopy: Not on file  FOBT/FIT: Not on file  Cologuard: Not on file  Sigmoidoscopy: Not on file    Screening Not Indicated  History Colorectal Cancer     Prostate Cancer Screening Individualized decision between patient and health care provider in men between ages of 55-69   Medicare will cover every 12 months beginning on the day after your 50th birthday PSA: No results in last 5 years     Screening Not Indicated     Hepatitis C Screening Once for adults born between 1945 and 1965  More frequently in patients at high risk for Hepatitis C Hep C Antibody: Not on file        Diabetes Screening 1-2 times per year if you're at risk for diabetes or have pre-diabetes Fasting glucose: 106 mg/dL (4/4/2023)  A1C: No results in last 5 years (No  results in last 5 years)  Screening Current   Cholesterol Screening Once every 5 years if you don't have a lipid disorder. May order more often based on risk factors. Lipid panel: 04/04/2023  Screening Not Indicated  History Lipid Disorder      Other Preventive Screenings Covered by Medicare:  Abdominal Aortic Aneurysm (AAA) Screening: covered once if your at risk. You're considered to be at risk if you have a family history of AAA or a male between the age of 65-75 who smoking at least 100 cigarettes in your lifetime.  Lung Cancer Screening: covers low dose CT scan once per year if you meet all of the following conditions: (1) Age 55-77; (2) No signs or symptoms of lung cancer; (3) Current smoker or have quit smoking within the last 15 years; (4) You have a tobacco smoking history of at least 20 pack years (packs per day x number of years you smoked); (5) You get a written order from a healthcare provider.  Glaucoma Screening: covered annually if you're considered high risk: (1) You have diabetes OR (2) Family history of glaucoma OR (3)  aged 50 and older OR (4)  American aged 65 and older  Osteoporosis Screening: covered every 2 years if you meet one of the following conditions: (1) Have a vertebral abnormality; (2) On glucocorticoid therapy for more than 3 months; (3) Have primary hyperparathyroidism; (4) On osteoporosis medications and need to assess response to drug therapy.  HIV Screening: covered annually if you're between the age of 15-65. Also covered annually if you are younger than 15 and older than 65 with risk factors for HIV infection. For pregnant patients, it is covered up to 3 times per pregnancy.    Immunizations:  Immunization Recommendations   Influenza Vaccine Annual influenza vaccination during flu season is recommended for all persons aged >= 6 months who do not have contraindications   Pneumococcal Vaccine   * Pneumococcal conjugate vaccine = PCV13 (Prevnar 13), PCV15  (Vaxneuvance), PCV20 (Prevnar 20)  * Pneumococcal polysaccharide vaccine = PPSV23 (Pneumovax) Adults 19-65 yo with certain risk factors or if 65+ yo  If never received any pneumonia vaccine: recommend Prevnar 20 (PCV20)  Give PCV20 if previously received 1 dose of PCV13 or PPSV23   Hepatitis B Vaccine 3 dose series if at intermediate or high risk (ex: diabetes, end stage renal disease, liver disease)   Respiratory syncytial virus (RSV) Vaccine - COVERED BY MEDICARE PART D  * RSVPreF3 (Arexvy) CDC recommends that adults 60 years of age and older may receive a single dose of RSV vaccine using shared clinical decision-making (SCDM)   Tetanus (Td) Vaccine - COST NOT COVERED BY MEDICARE PART B Following completion of primary series, a booster dose should be given every 10 years to maintain immunity against tetanus. Td may also be given as tetanus wound prophylaxis.   Tdap Vaccine - COST NOT COVERED BY MEDICARE PART B Recommended at least once for all adults. For pregnant patients, recommended with each pregnancy.   Shingles Vaccine (Shingrix) - COST NOT COVERED BY MEDICARE PART B  2 shot series recommended in those 19 years and older who have or will have weakened immune systems or those 50 years and older     Health Maintenance Due:  There are no preventive care reminders to display for this patient.  Immunizations Due:      Topic Date Due   • Influenza Vaccine (1) 09/01/2023   • COVID-19 Vaccine (5 - 2023-24 season) 09/01/2023     Advance Directives   What are advance directives?  Advance directives are legal documents that state your wishes and plans for medical care. These plans are made ahead of time in case you lose your ability to make decisions for yourself. Advance directives can apply to any medical decision, such as the treatments you want, and if you want to donate organs.   What are the types of advance directives?  There are many types of advance directives, and each state has rules about how to use them.  You may choose a combination of any of the following:  Living will:  This is a written record of the treatment you want. You can also choose which treatments you do not want, which to limit, and which to stop at a certain time. This includes surgery, medicine, IV fluid, and tube feedings.   Durable power of  for healthcare (DPAHC):  This is a written record that states who you want to make healthcare choices for you when you are unable to make them for yourself. This person, called a proxy, is usually a family member or a friend. You may choose more than 1 proxy.  Do not resuscitate (DNR) order:  A DNR order is used in case your heart stops beating or you stop breathing. It is a request not to have certain forms of treatment, such as CPR. A DNR order may be included in other types of advance directives.  Medical directive:  This covers the care that you want if you are in a coma, near death, or unable to make decisions for yourself. You can list the treatments you want for each condition. Treatment may include pain medicine, surgery, blood transfusions, dialysis, IV or tube feedings, and a ventilator (breathing machine).  Values history:  This document has questions about your views, beliefs, and how you feel and think about life. This information can help others choose the care that you would choose.  Why are advance directives important?  An advance directive helps you control your care. Although spoken wishes may be used, it is better to have your wishes written down. Spoken wishes can be misunderstood, or not followed. Treatments may be given even if you do not want them. An advance directive may make it easier for your family to make difficult choices about your care.       © Copyright [employer] 2018 Information is for End User's use only and may not be sold, redistributed or otherwise used for commercial purposes. All illustrations and images included in CareNotes® are the copyrighted property of  [employer] or [employer]    Medicare Preventive Visit Patient Instructions  Thank you for completing your Welcome to Medicare Visit or Medicare Annual Wellness Visit today. Your next wellness visit will be due in one year (1/10/2025).  The screening/preventive services that you may require over the next 5-10 years are detailed below. Some tests may not apply to you based off risk factors and/or age. Screening tests ordered at today's visit but not completed yet may show as past due. Also, please note that scanned in results may not display below.  Preventive Screenings:  Service Recommendations Previous Testing/Comments   Colorectal Cancer Screening  Colonoscopy    Fecal Occult Blood Test (FOBT)/Fecal Immunochemical Test (FIT)  Fecal DNA/Cologuard Test  Flexible Sigmoidoscopy Age: 45-75 years old   Colonoscopy: every 10 years (May be performed more frequently if at higher risk)  OR  FOBT/FIT: every 1 year  OR  Cologuard: every 3 years  OR  Sigmoidoscopy: every 5 years  Screening may be recommended earlier than age 45 if at higher risk for colorectal cancer. Also, an individualized decision between you and your healthcare provider will decide whether screening between the ages of 76-85 would be appropriate. Colonoscopy: Not on file  FOBT/FIT: Not on file  Cologuard: Not on file  Sigmoidoscopy: Not on file    Screening Not Indicated  History Colorectal Cancer     Prostate Cancer Screening Individualized decision between patient and health care provider in men between ages of 55-69   Medicare will cover every 12 months beginning on the day after your 50th birthday PSA: No results in last 5 years     Screening Not Indicated     Hepatitis C Screening Once for adults born between 1945 and 1965  More frequently in patients at high risk for Hepatitis C Hep C Antibody: Not on file        Diabetes Screening 1-2 times per year if you're at risk for diabetes or have pre-diabetes Fasting glucose: 106 mg/dL  (4/4/2023)  A1C: No results in last 5 years (No results in last 5 years)  Screening Current   Cholesterol Screening Once every 5 years if you don't have a lipid disorder. May order more often based on risk factors. Lipid panel: 04/04/2023  Screening Not Indicated  History Lipid Disorder      Other Preventive Screenings Covered by Medicare:  Abdominal Aortic Aneurysm (AAA) Screening: covered once if your at risk. You're considered to be at risk if you have a family history of AAA or a male between the age of 65-75 who smoking at least 100 cigarettes in your lifetime.  Lung Cancer Screening: covers low dose CT scan once per year if you meet all of the following conditions: (1) Age 55-77; (2) No signs or symptoms of lung cancer; (3) Current smoker or have quit smoking within the last 15 years; (4) You have a tobacco smoking history of at least 20 pack years (packs per day x number of years you smoked); (5) You get a written order from a healthcare provider.  Glaucoma Screening: covered annually if you're considered high risk: (1) You have diabetes OR (2) Family history of glaucoma OR (3)  aged 50 and older OR (4)  American aged 65 and older  Osteoporosis Screening: covered every 2 years if you meet one of the following conditions: (1) Have a vertebral abnormality; (2) On glucocorticoid therapy for more than 3 months; (3) Have primary hyperparathyroidism; (4) On osteoporosis medications and need to assess response to drug therapy.  HIV Screening: covered annually if you're between the age of 15-65. Also covered annually if you are younger than 15 and older than 65 with risk factors for HIV infection. For pregnant patients, it is covered up to 3 times per pregnancy.    Immunizations:  Immunization Recommendations   Influenza Vaccine Annual influenza vaccination during flu season is recommended for all persons aged >= 6 months who do not have contraindications   Pneumococcal Vaccine   *  Pneumococcal conjugate vaccine = PCV13 (Prevnar 13), PCV15 (Vaxneuvance), PCV20 (Prevnar 20)  * Pneumococcal polysaccharide vaccine = PPSV23 (Pneumovax) Adults 19-63 yo with certain risk factors or if 65+ yo  If never received any pneumonia vaccine: recommend Prevnar 20 (PCV20)  Give PCV20 if previously received 1 dose of PCV13 or PPSV23   Hepatitis B Vaccine 3 dose series if at intermediate or high risk (ex: diabetes, end stage renal disease, liver disease)   Respiratory syncytial virus (RSV) Vaccine - COVERED BY MEDICARE PART D  * RSVPreF3 (Arexvy) CDC recommends that adults 60 years of age and older may receive a single dose of RSV vaccine using shared clinical decision-making (SCDM)   Tetanus (Td) Vaccine - COST NOT COVERED BY MEDICARE PART B Following completion of primary series, a booster dose should be given every 10 years to maintain immunity against tetanus. Td may also be given as tetanus wound prophylaxis.   Tdap Vaccine - COST NOT COVERED BY MEDICARE PART B Recommended at least once for all adults. For pregnant patients, recommended with each pregnancy.   Shingles Vaccine (Shingrix) - COST NOT COVERED BY MEDICARE PART B  2 shot series recommended in those 19 years and older who have or will have weakened immune systems or those 50 years and older     Health Maintenance Due:  There are no preventive care reminders to display for this patient.  Immunizations Due:      Topic Date Due   • Influenza Vaccine (1) 09/01/2023   • COVID-19 Vaccine (5 - 2023-24 season) 09/01/2023     Advance Directives   What are advance directives?  Advance directives are legal documents that state your wishes and plans for medical care. These plans are made ahead of time in case you lose your ability to make decisions for yourself. Advance directives can apply to any medical decision, such as the treatments you want, and if you want to donate organs.   What are the types of advance directives?  There are many types of advance  directives, and each state has rules about how to use them. You may choose a combination of any of the following:  Living will:  This is a written record of the treatment you want. You can also choose which treatments you do not want, which to limit, and which to stop at a certain time. This includes surgery, medicine, IV fluid, and tube feedings.   Durable power of  for healthcare (DPAHC):  This is a written record that states who you want to make healthcare choices for you when you are unable to make them for yourself. This person, called a proxy, is usually a family member or a friend. You may choose more than 1 proxy.  Do not resuscitate (DNR) order:  A DNR order is used in case your heart stops beating or you stop breathing. It is a request not to have certain forms of treatment, such as CPR. A DNR order may be included in other types of advance directives.  Medical directive:  This covers the care that you want if you are in a coma, near death, or unable to make decisions for yourself. You can list the treatments you want for each condition. Treatment may include pain medicine, surgery, blood transfusions, dialysis, IV or tube feedings, and a ventilator (breathing machine).  Values history:  This document has questions about your views, beliefs, and how you feel and think about life. This information can help others choose the care that you would choose.  Why are advance directives important?  An advance directive helps you control your care. Although spoken wishes may be used, it is better to have your wishes written down. Spoken wishes can be misunderstood, or not followed. Treatments may be given even if you do not want them. An advance directive may make it easier for your family to make difficult choices about your care.       © Copyright [employer] 2018 Information is for End User's use only and may not be sold, redistributed or otherwise used for commercial purposes. All illustrations and  images included in CareNotes® are the copyrighted property of [email or web address] or [employer]

## 2024-01-11 DIAGNOSIS — K21.9 GASTROESOPHAGEAL REFLUX DISEASE WITHOUT ESOPHAGITIS: Primary | Chronic | ICD-10-CM

## 2024-01-11 RX ORDER — FAMOTIDINE 40 MG/1
40 TABLET, FILM COATED ORAL DAILY
Qty: 90 TABLET | Refills: 1 | Status: SHIPPED | OUTPATIENT
Start: 2024-01-11

## 2024-01-31 ENCOUNTER — APPOINTMENT (OUTPATIENT)
Dept: LAB | Facility: IMAGING CENTER | Age: 89
End: 2024-01-31
Payer: MEDICARE

## 2024-01-31 DIAGNOSIS — E78.2 MIXED HYPERLIPIDEMIA: ICD-10-CM

## 2024-01-31 LAB
ANION GAP SERPL CALCULATED.3IONS-SCNC: 8 MMOL/L
BUN SERPL-MCNC: 21 MG/DL (ref 5–25)
CALCIUM SERPL-MCNC: 8.9 MG/DL (ref 8.4–10.2)
CHLORIDE SERPL-SCNC: 106 MMOL/L (ref 96–108)
CO2 SERPL-SCNC: 26 MMOL/L (ref 21–32)
CREAT SERPL-MCNC: 1.23 MG/DL (ref 0.6–1.3)
GFR SERPL CREATININE-BSD FRML MDRD: 51 ML/MIN/1.73SQ M
GLUCOSE P FAST SERPL-MCNC: 108 MG/DL (ref 65–99)
POTASSIUM SERPL-SCNC: 4.2 MMOL/L (ref 3.5–5.3)
SODIUM SERPL-SCNC: 140 MMOL/L (ref 135–147)

## 2024-01-31 PROCEDURE — 36415 COLL VENOUS BLD VENIPUNCTURE: CPT

## 2024-01-31 PROCEDURE — 80048 BASIC METABOLIC PNL TOTAL CA: CPT

## 2024-02-16 ENCOUNTER — APPOINTMENT (OUTPATIENT)
Dept: LAB | Facility: IMAGING CENTER | Age: 89
End: 2024-02-16
Payer: MEDICARE

## 2024-02-16 DIAGNOSIS — Z01.818 PREOP EXAMINATION: ICD-10-CM

## 2024-02-16 DIAGNOSIS — I73.9 CLAUDICATION OF LOWER EXTREMITY (HCC): ICD-10-CM

## 2024-02-16 LAB
ERYTHROCYTE [DISTWIDTH] IN BLOOD BY AUTOMATED COUNT: 12.7 % (ref 11.6–15.1)
HCT VFR BLD AUTO: 35 % (ref 36.5–49.3)
HGB BLD-MCNC: 12 G/DL (ref 12–17)
MCH RBC QN AUTO: 33.3 PG (ref 26.8–34.3)
MCHC RBC AUTO-ENTMCNC: 34.3 G/DL (ref 31.4–37.4)
MCV RBC AUTO: 97 FL (ref 82–98)
PLATELET # BLD AUTO: 192 THOUSANDS/UL (ref 149–390)
PMV BLD AUTO: 10.5 FL (ref 8.9–12.7)
RBC # BLD AUTO: 3.6 MILLION/UL (ref 3.88–5.62)
WBC # BLD AUTO: 4.46 THOUSAND/UL (ref 4.31–10.16)

## 2024-02-16 PROCEDURE — 85027 COMPLETE CBC AUTOMATED: CPT

## 2024-02-16 PROCEDURE — 36415 COLL VENOUS BLD VENIPUNCTURE: CPT

## 2024-02-29 DIAGNOSIS — R33.8 ACUTE URINARY RETENTION: ICD-10-CM

## 2024-02-29 DIAGNOSIS — N40.0 ENLARGED PROSTATE: ICD-10-CM

## 2024-02-29 RX ORDER — FINASTERIDE 5 MG/1
5 TABLET, FILM COATED ORAL DAILY
Qty: 90 TABLET | Refills: 3 | OUTPATIENT
Start: 2024-02-29

## 2024-02-29 RX ORDER — TAMSULOSIN HYDROCHLORIDE 0.4 MG/1
0.4 CAPSULE ORAL
Qty: 90 CAPSULE | Refills: 3 | OUTPATIENT
Start: 2024-02-29

## 2024-03-04 ENCOUNTER — TELEPHONE (OUTPATIENT)
Age: 89
End: 2024-03-04

## 2024-03-04 DIAGNOSIS — N40.0 ENLARGED PROSTATE: ICD-10-CM

## 2024-03-04 DIAGNOSIS — R33.8 ACUTE URINARY RETENTION: ICD-10-CM

## 2024-03-04 NOTE — TELEPHONE ENCOUNTER
Reason for call:   [x] Refill   [] Prior Auth  [x] Other: Pt is also requesting a virtual appointment - forwarding request to clerical team.    Office:   [] PCP/Provider -   [x] Specialty/Provider - [facility]         Pharmacy: [facility]    Does the patient have enough for 3 days?   [] Yes   [x] No - Send as HP to POD

## 2024-03-04 NOTE — TELEPHONE ENCOUNTER
Virtual visit scheduled with Tory for a med refill on 3/27 at 8:00 in the [facility].

## 2024-03-04 NOTE — TELEPHONE ENCOUNTER
Patient called the RX Refill Line. Message is being forwarded to the office.     Patient is requesting to make a virtual appointment to be seen. He is aware Olimpia Rodriguez left and is ok with who ever can see him virtually/over phone.    Please contact patient at

## 2024-03-08 RX ORDER — FINASTERIDE 5 MG/1
5 TABLET, FILM COATED ORAL DAILY
Qty: 90 TABLET | Refills: 1 | OUTPATIENT
Start: 2024-03-08

## 2024-03-08 RX ORDER — TAMSULOSIN HYDROCHLORIDE 0.4 MG/1
0.4 CAPSULE ORAL
Qty: 90 CAPSULE | Refills: 1 | OUTPATIENT
Start: 2024-03-08

## 2024-03-08 NOTE — TELEPHONE ENCOUNTER
Appt has been scheduled for 3/27, I called pt today not realizing he had a med refill visit scheduled already, but pt was very understanding and I could clarify that med will not be refilled until discussed with provider at that appt.     Viritual visit scheduled with JEFF Temple at 8am on 3/27

## 2024-03-10 ENCOUNTER — HOSPITAL ENCOUNTER (EMERGENCY)
Facility: HOSPITAL | Age: 89
Discharge: HOME | End: 2024-03-10
Attending: EMERGENCY MEDICINE | Admitting: EMERGENCY MEDICINE
Payer: MEDICARE

## 2024-03-10 VITALS
TEMPERATURE: 98 F | BODY MASS INDEX: 24.14 KG/M2 | SYSTOLIC BLOOD PRESSURE: 217 MMHG | HEART RATE: 74 BPM | DIASTOLIC BLOOD PRESSURE: 101 MMHG | WEIGHT: 168.21 LBS | OXYGEN SATURATION: 99 % | RESPIRATION RATE: 16 BRPM

## 2024-03-10 DIAGNOSIS — R33.8 ACUTE URINARY RETENTION: Primary | ICD-10-CM

## 2024-03-10 DIAGNOSIS — N40.0 ENLARGED PROSTATE: ICD-10-CM

## 2024-03-10 LAB
BILIRUB UR QL STRIP: NEGATIVE
CLARITY UR: CLEAR
COLOR UR: NORMAL
GLUCOSE UR STRIP-MCNC: NEGATIVE MG/DL
HGB UR QL STRIP.AUTO: NEGATIVE
KETONES UR STRIP-MCNC: NEGATIVE MG/DL
LEUKOCYTE ESTERASE UR QL STRIP: NEGATIVE
NITRITE UR QL STRIP: NEGATIVE
PH UR STRIP.AUTO: 6.5 [PH]
PROT UR STRIP-MCNC: NEGATIVE MG/DL
SP GR UR STRIP.AUTO: 1.01 (ref 1–1.03)
UROBILINOGEN UR STRIP-ACNC: <2 MG/DL

## 2024-03-10 PROCEDURE — 99283 EMERGENCY DEPT VISIT LOW MDM: CPT

## 2024-03-10 PROCEDURE — 81003 URINALYSIS AUTO W/O SCOPE: CPT | Performed by: EMERGENCY MEDICINE

## 2024-03-10 PROCEDURE — 99284 EMERGENCY DEPT VISIT MOD MDM: CPT | Performed by: EMERGENCY MEDICINE

## 2024-03-10 PROCEDURE — 87086 URINE CULTURE/COLONY COUNT: CPT | Performed by: EMERGENCY MEDICINE

## 2024-03-10 RX ORDER — FINASTERIDE 5 MG/1
5 TABLET, FILM COATED ORAL DAILY
Qty: 30 TABLET | Refills: 0 | Status: SHIPPED | OUTPATIENT
Start: 2024-03-10 | End: 2024-04-09

## 2024-03-10 RX ORDER — TAMSULOSIN HYDROCHLORIDE 0.4 MG/1
0.4 CAPSULE ORAL
Qty: 30 CAPSULE | Refills: 0 | Status: SHIPPED | OUTPATIENT
Start: 2024-03-10 | End: 2024-04-09

## 2024-03-10 NOTE — ED NOTES
This RN educated patient on shin care. This RN also educated patient on how to change drainage bag and how to care for shin catheter. Patient verbalizes understanding of procedure.     Karyn Michael RN  03/10/24 [id number]

## 2024-03-10 NOTE — DISCHARGE INSTRUCTIONS
The number for the urologist is included in these discharge instructions. Call tomorrow, let them know you were in the ER had had a Mo Catheter placed. You are to be seen in the office for continued management, the office will determine the appropriate time for a follow up visit.

## 2024-03-10 NOTE — ED PROVIDER NOTES
History  Chief Complaint   Patient presents with    Urinary Retention     Pt reports unable to urinate since 930pm yesterday.      89 YO male presents with urinary retention. Patient has a history of an enlarged prostate, states he had been taking finasteride and tamsulosin, he did run out and has not taken these medications for the last week. He has not bene able to urinate for the last 10 hours, states increasing lower abdominal pain. He has no fevers, Pt denies CP/SOB/F/C/N/V/D/C.      History provided by:  Patient   used: No        Prior to Admission Medications   Prescriptions Last Dose Informant Patient Reported? Taking?   Multiple Vitamins-Minerals (MULTIVITAL-M PO)  Self Yes No   Sig: Take 1 tablet by mouth   amLODIPine (NORVASC) 2.5 mg tablet  Self No No   Sig: TAKE 1 TABLET BY MOUTH DAILY @ 9A (HTN)   aspirin 81 mg chewable tablet  Self Yes No   Sig: Chew 81 mg   atorvastatin (LIPITOR) 20 mg tablet  Self No No   Sig: TAKE 1 TABLET ONCE A DAY @ 9PM.(CHOLESTEROL)   cilostazol (PLETAL) 50 mg tablet   Yes No   Sig: Take 50 mg by mouth 2 (two) times a day   famotidine (PEPCID) 40 MG tablet   No No   Sig: Take 1 tablet (40 mg total) by mouth daily   finasteride (PROSCAR) 5 mg tablet  Self No No   Sig: Take 1 tablet (5 mg total) by mouth daily   finasteride (PROSCAR) 5 mg tablet   No Yes   Sig: Take 1 tablet (5 mg total) by mouth daily   folic acid (FOLVITE) 800 MCG tablet  Self Yes No   Sig: Take 400 mcg by mouth daily   hydrochlorothiazide (HYDRODIURIL) 12.5 mg tablet  Self No No   Sig: TAKE 1 TABLET BY MOUTH DAILY @ 9A (HTN)   levocetirizine (XYZAL) 5 MG tablet  Self No No   Sig: Take 1 tablet (5 mg total) by mouth every evening   lisinopril (ZESTRIL) 10 mg tablet  Self No No   Sig: Take 1 tablet (10 mg total) by mouth daily   metoprolol tartrate (LOPRESSOR) 100 mg tablet  Self No No   Sig: Take 1 tablet (100 mg total) by mouth every 12 (twelve) hours   tamsulosin (FLOMAX) 0.4 mg  Self No  No   Sig: Take 1 capsule (0.4 mg total) by mouth daily at bedtime   tamsulosin (FLOMAX) 0.4 mg   No Yes   Sig: Take 1 capsule (0.4 mg total) by mouth daily at bedtime      Facility-Administered Medications Last Administration Doses Remaining   aspirin chewable tablet 324 mg 11/5/2021  9:06 AM           Past Medical History:   Diagnosis Date    Anemia, iron deficiency     Drug Therapy, poor absorbtion documented    Appendicitis     Arthritis 01/25/2019    Cardiac disease     Colon cancer (HCC) 12/2013    Resected: BREANNA    Congenital nystagmus     Left    Gastroesophageal reflux disease without esophagitis 06/19/2020    Hyperlipidemia     Hypertension     Inguinal hernia     Sciatica 2010    resolved from problem list-2011    Stage 3 chronic kidney disease, unspecified whether stage 3a or 3b CKD (HCC) 05/03/2023       Past Surgical History:   Procedure Laterality Date    APPENDECTOMY      Appendicitis    CHOLECYSTECTOMY      COLON SIGMOID RESECTION  12/2013    COLON SURGERY      CORONARY ARTERY BYPASS GRAFT      x 2    CORONARY ARTERY BYPASS GRAFT      x2    CORONARY ARTERY BYPASS GRAFT      CORONARY ARTERY BYPASS GRAFT      INGUINAL HERNIA REPAIR      SHOULDER ARTHROCENTESIS Right     sub-acromial bursa area       Family History   Problem Relation Age of Onset    Hypertension Father     Heart disease Father     Heart disease Mother     Cancer Sister     Cancer Brother      I have reviewed and agree with the history as documented.    E-Cigarette/Vaping    E-Cigarette Use Never User      E-Cigarette/Vaping Substances    Nicotine No     THC No     CBD No     Flavoring No     Other No     Unknown No      Social History     Tobacco Use    Smoking status: Never    Smokeless tobacco: Never   Vaping Use    Vaping status: Never Used   Substance Use Topics    Alcohol use: No    Drug use: No       Review of Systems   Constitutional:  Negative for fever.   HENT:  Negative for dental problem.    Eyes:  Negative for visual  disturbance.   Respiratory:  Negative for shortness of breath.    Cardiovascular:  Negative for chest pain.   Gastrointestinal:  Negative for abdominal pain, nausea and vomiting.   Genitourinary:  Positive for difficulty urinating. Negative for dysuria and frequency.   Musculoskeletal:  Negative for neck pain and neck stiffness.   Skin:  Negative for rash.   Neurological:  Negative for dizziness, weakness and light-headedness.   Psychiatric/Behavioral:  Negative for agitation, behavioral problems and confusion.    All other systems reviewed and are negative.      Physical Exam  Physical Exam  Vitals and nursing note reviewed.   Constitutional:       Appearance: He is well-developed.   HENT:      Head: Normocephalic and atraumatic.   Eyes:      Extraocular Movements: Extraocular movements intact.   Cardiovascular:      Rate and Rhythm: Normal rate and regular rhythm.      Pulses: Normal pulses.      Heart sounds: Normal heart sounds.   Pulmonary:      Effort: Pulmonary effort is normal.   Abdominal:      General: There is no distension.      Tenderness: There is abdominal tenderness.   Musculoskeletal:         General: Normal range of motion.      Cervical back: Normal range of motion.   Skin:     Findings: No rash.   Neurological:      Mental Status: He is alert and oriented to person, place, and time.   Psychiatric:         Behavior: Behavior normal.         Vital Signs  ED Triage Vitals   Temperature Pulse Respirations Blood Pressure SpO2   03/10/24 0712 03/10/24 0709 03/10/24 0709 03/10/24 0709 03/10/24 0709   98 °F (36.7 °C) 74 16 (!) 217/101 99 %      Temp src Heart Rate Source Patient Position - Orthostatic VS BP Location FiO2 (%)   -- 03/10/24 0709 03/10/24 0709 03/10/24 0709 --    Monitor Lying Left arm       Pain Score       --                  Vitals:    03/10/24 0709   BP: (!) 217/101   Pulse: 74   Patient Position - Orthostatic VS: Lying         Visual Acuity      ED Medications  Medications - No data to  display    Diagnostic Studies  Results Reviewed       Procedure Component Value Units Date/Time    UA (URINE) with reflex to Scope [[id number]] Collected: 03/10/24 0739    Lab Status: Final result Specimen: Urine, Indwelling Mo Catheter Updated: 03/10/24 0757     Color, UA Light Yellow     Clarity, UA Clear     Specific Gravity, UA 1.008     pH, UA 6.5     Leukocytes, UA Negative     Nitrite, UA Negative     Protein, UA Negative mg/dl      Glucose, UA Negative mg/dl      Ketones, UA Negative mg/dl      Urobilinogen, UA <2.0 mg/dl      Bilirubin, UA Negative     Occult Blood, UA Negative    Urine culture [[id number]] Collected: 03/10/24 0739    Lab Status: In process Specimen: Urine, Indwelling Mo Catheter Updated: 03/10/24 0745                   No orders to display              Procedures  Procedures         ED Course  ED Course as of 03/10/24 0842   Sun Mar 10, 2024   0748 Mo catheter placed, drained >700, clear. Patient feeling improved.    0759 Clarity, UA: Clear  Culture sent but does not appear to require antibiotics at this time. Will discharge with home medications.                                SBIRT 22yo+      Flowsheet Row Most Recent Value   Initial Alcohol Screen: US AUDIT-C     1. How often do you have a drink containing alcohol? 0 Filed at: 03/10/2024 0738   2. How many drinks containing alcohol do you have on a typical day you are drinking?  0 Filed at: 03/10/2024 0738   3b. FEMALE Any Age, or MALE 65+: How often do you have 4 or more drinks on one occassion? 0 Filed at: 03/10/2024 0738   Audit-C Score 0 Filed at: 03/10/2024 0738   CRISTIANE: How many times in the past year have you...    Used an illegal drug or used a prescription medication for non-medical reasons? Never Filed at: 03/10/2024 0738                      Medical Decision Making  1. Urinary retention - Patient states he has not taken his medications for urinary retention in the last week. Will have a catheter placed, check urine for  possible infection.    Problems Addressed:  Acute urinary retention: acute illness or injury    Amount and/or Complexity of Data Reviewed  External Data Reviewed: notes.  Labs: ordered. Decision-making details documented in ED Course.    Risk  Prescription drug management.             Disposition  Final diagnoses:   Acute urinary retention     Time reflects when diagnosis was documented in both MDM as applicable and the Disposition within this note       Time User Action Codes Description Comment    3/10/2024  7:59 AM Asad Sandra Add [R33.8] Acute urinary retention     3/10/2024  8:02 AM Asad Sandra Add [N40.0] Enlarged prostate           ED Disposition       ED Disposition   Discharge    Condition   Stable    Date/Time   Sun Mar 10, 2024 0757    Comment   Jameel Benton discharge to home/self care.                   Follow-up Information       Follow up With Specialties Details Why Contact Info Additional Information    [facility]   [address] [place] [id number]  [phone] [facility] [address], [place], [place], [address]   [phone]            Discharge Medication List as of 3/10/2024  8:03 AM        CONTINUE these medications which have CHANGED    Details   finasteride (PROSCAR) 5 mg tablet Take 1 tablet (5 mg total) by mouth daily, Starting Sun 3/10/2024, Until Tue 4/9/2024, Normal      tamsulosin (FLOMAX) 0.4 mg Take 1 capsule (0.4 mg total) by mouth daily at bedtime, Starting Sun 3/10/2024, Until Tue 4/9/2024, Normal           CONTINUE these medications which have NOT CHANGED    Details   amLODIPine (NORVASC) 2.5 mg tablet TAKE 1 TABLET BY MOUTH DAILY @ 9A (HTN), Normal      aspirin 81 mg chewable tablet Chew 81 mg, Historical Med      atorvastatin (LIPITOR) 20 mg tablet TAKE 1 TABLET ONCE A DAY @ 9PM.(CHOLESTEROL), Normal      cilostazol (PLETAL) 50 mg tablet Take 50  mg by mouth 2 (two) times a day, Historical Med      famotidine (PEPCID) 40 MG tablet Take 1 tablet (40 mg total) by mouth daily, Starting Thu 1/11/2024, Normal      folic acid (FOLVITE) 800 MCG tablet Take 400 mcg by mouth daily, Historical Med      hydrochlorothiazide (HYDRODIURIL) 12.5 mg tablet TAKE 1 TABLET BY MOUTH DAILY @ 9A (HTN), Normal      levocetirizine (XYZAL) 5 MG tablet Take 1 tablet (5 mg total) by mouth every evening, Starting Wed 8/2/2023, Normal      lisinopril (ZESTRIL) 10 mg tablet Take 1 tablet (10 mg total) by mouth daily, Starting Wed 12/27/2023, Normal      metoprolol tartrate (LOPRESSOR) 100 mg tablet Take 1 tablet (100 mg total) by mouth every 12 (twelve) hours, Starting Fri 11/3/2023, Normal      Multiple Vitamins-Minerals (MULTIVITAL-M PO) Take 1 tablet by mouth, Historical Med             No discharge procedures on file.    PDMP Review       None            ED Provider  Electronically Signed by             Asad Sandra MD  03/10/24 9004

## 2024-03-11 LAB — BACTERIA UR CULT: NORMAL

## 2024-03-11 NOTE — TELEPHONE ENCOUNTER
JEFF Temple  [facility]1 hour ago (12:09 PM)      He can have a void trial prior to our appointment on the 27th. I would probably feel more comfortable if he were to come to an in-person appointment on the 27th so we can do another PVR to make sure he is not retaining. Please ask the patient if he would be amenable to this plan and switch from virtual to in-person. Thanks!

## 2024-03-11 NOTE — TELEPHONE ENCOUNTER
Called and spoke with patient. Informed on needing two part appt and that protocol is 7-10 days post shin insertion for void trial.

## 2024-03-11 NOTE — TELEPHONE ENCOUNTER
Patient last seen in office 2/3/23 and is scheduled for a virtural visit for medication refill on 3/27/24 - Can we schedule a void trial with nurse prior to appointment on 3/27/24

## 2024-03-11 NOTE — TELEPHONE ENCOUNTER
Patient called stating he was in the ER Yesterday for urinary retention. He has a shin catheter and would like to have it removed.     Patient can be reached at [phone]

## 2024-03-18 ENCOUNTER — PROCEDURE VISIT (OUTPATIENT)
Dept: UROLOGY | Facility: AMBULATORY SURGERY CENTER | Age: 89
End: 2024-03-18
Payer: MEDICARE

## 2024-03-18 VITALS
WEIGHT: 172.8 LBS | HEART RATE: 65 BPM | DIASTOLIC BLOOD PRESSURE: 62 MMHG | SYSTOLIC BLOOD PRESSURE: 116 MMHG | HEIGHT: 70 IN | BODY MASS INDEX: 24.74 KG/M2

## 2024-03-18 DIAGNOSIS — N40.0 ENLARGED PROSTATE: Primary | ICD-10-CM

## 2024-03-18 LAB — POST-VOID RESIDUAL VOLUME, ML POC: 45 ML

## 2024-03-18 PROCEDURE — 99211 OFF/OP EST MAY X REQ PHY/QHP: CPT

## 2024-03-18 PROCEDURE — 51798 US URINE CAPACITY MEASURE: CPT

## 2024-03-18 NOTE — PROGRESS NOTES
"3/18/2024    Jameel Benton  5/14/1932  [id number]    Diagnosis  Chief Complaint    Void trial       Patient presents for void trial managed by our office    Plan  Follow up as scheduled with AP  Educational handout provided and reviewed of s/s he may experience after catheter removal  Advised to contact the office in the meantime with any questions or concerns.    Procedure Mo removal/voiding trial    Mo catheter removed after deflation of an intact balloon. Patient tolerated well. Encouraged patient to hydrate well and return this afternoon for post void residual.  he knows he may return early if uncomfortable and unable to urinate. Patient agrees to this plan.    Patient returned this afternoon. Patient states able to void. Patient voided while in office. Bladder ultrasound performed and PVR measured 45 ml.    Recent Results (from the past 4 hour(s))   POCT Measure PVR    Collection Time: 03/18/24  3:41 PM   Result Value Ref Range    POST-VOID RESIDUAL VOLUME, ML POC 45 mL     Vitals:    03/18/24 0854   BP: 116/62   BP Location: Left arm   Patient Position: Sitting   Cuff Size: Adult   Pulse: 65   Weight: 78.4 kg (172 lb 12.8 oz)   Height: 5' 10\" (1.778 m)             Porsche Feliciano, HANSN, RN       "

## 2024-03-21 ENCOUNTER — OFFICE VISIT (OUTPATIENT)
Dept: INTERNAL MEDICINE CLINIC | Facility: OTHER | Age: 89
End: 2024-03-21
Payer: MEDICARE

## 2024-03-21 VITALS
TEMPERATURE: 98.1 F | BODY MASS INDEX: 24.45 KG/M2 | SYSTOLIC BLOOD PRESSURE: 118 MMHG | DIASTOLIC BLOOD PRESSURE: 74 MMHG | RESPIRATION RATE: 18 BRPM | WEIGHT: 170.8 LBS | HEART RATE: 51 BPM | OXYGEN SATURATION: 100 % | HEIGHT: 70 IN

## 2024-03-21 DIAGNOSIS — I10 ESSENTIAL HYPERTENSION: ICD-10-CM

## 2024-03-21 DIAGNOSIS — N18.30 STAGE 3 CHRONIC KIDNEY DISEASE, UNSPECIFIED WHETHER STAGE 3A OR 3B CKD (HCC): Primary | ICD-10-CM

## 2024-03-21 DIAGNOSIS — I95.89 EXERTIONAL HYPOTENSION: ICD-10-CM

## 2024-03-21 PROCEDURE — G2211 COMPLEX E/M VISIT ADD ON: HCPCS | Performed by: INTERNAL MEDICINE

## 2024-03-21 PROCEDURE — 99213 OFFICE O/P EST LOW 20 MIN: CPT | Performed by: INTERNAL MEDICINE

## 2024-03-21 RX ORDER — OMEPRAZOLE 20 MG/1
20 CAPSULE, DELAYED RELEASE ORAL DAILY
COMMUNITY

## 2024-03-21 RX ORDER — AMLODIPINE BESYLATE 2.5 MG/1
TABLET ORAL
Qty: 30 TABLET | Refills: 5 | Status: SHIPPED | OUTPATIENT
Start: 2024-03-21

## 2024-03-21 RX ORDER — CLOPIDOGREL BISULFATE 75 MG/1
75 TABLET ORAL DAILY
COMMUNITY
Start: 2024-02-23 | End: 2025-02-22

## 2024-03-21 NOTE — PROGRESS NOTES
Assessment/Plan:    Essential hypertension  Controlled. Will discontinue HCTZ. Continue lisinopril 10 mg daily, metoprolol tartrate 100 mg BID, and norvasc 2.5 mg daily.     Stage 3 chronic kidney disease, unspecified whether stage 3a or 3b CKD (HCC)  Lab Results   Component Value Date    EGFR 51 01/31/2024    EGFR 45 04/04/2023    EGFR 73 04/23/2022    CREATININE 1.23 01/31/2024    CREATININE 1.37 (H) 04/04/2023    CREATININE 0.92 04/23/2022   Continue to trend kidney function.     Exertional hypotension  Recommended that he increase his oral hydration.       Diagnoses and all orders for this visit:    Stage 3 chronic kidney disease, unspecified whether stage 3a or 3b CKD (HCC)    Essential hypertension  -     amLODIPine (NORVASC) 2.5 mg tablet; TAKE 1 TABLET BY MOUTH DAILY @ 9A (HTN)    Exertional hypotension    Other orders  -     clopidogrel (PLAVIX) 75 mg tablet; Take 75 mg by mouth daily  -     omeprazole (PriLOSEC) 20 mg delayed release capsule; Take 20 mg by mouth daily                  Subjective:      Patient ID: Jameel Benton is a 89 y.o. male.    Chief Complaint   Patient presents with   • Hypotension     patient stated he started doing PT with [facility] and they check his vitals before and after PT and he is concerned with low blood pressure. Last 2 weeks they have been in low 100's was dizzy every know and then        Jameel Benton is seen today with concern for hypotension.   He has been partaking in physical therapy and his BP has dropped a couple of times during sessions in the past 2 weeks.     Hypertension  This is a chronic problem. The current episode started more than 1 year ago. The problem is unchanged. The problem is controlled. Pertinent negatives include no chest pain, headaches, palpitations or shortness of breath.       The following portions of the patient's history were reviewed and updated as appropriate: allergies, current medications, past family history, past medical history,  past social history, past surgical history, and problem list.    Review of Systems   Constitutional:  Negative for activity change, appetite change, chills, diaphoresis, fatigue and fever.   HENT:  Negative for congestion, postnasal drip, rhinorrhea, sinus pressure, sinus pain, sneezing and sore throat.    Eyes:  Negative for visual disturbance.   Respiratory:  Negative for apnea, cough, choking, chest tightness, shortness of breath and wheezing.    Cardiovascular:  Negative for chest pain, palpitations and leg swelling.   Gastrointestinal:  Negative for abdominal distention, abdominal pain, anal bleeding, blood in stool, constipation, diarrhea, nausea and vomiting.   Endocrine: Negative for cold intolerance and heat intolerance.   Genitourinary:  Negative for difficulty urinating, dysuria and hematuria.   Musculoskeletal: Negative.    Skin: Negative.    Neurological:  Negative for dizziness, weakness, light-headedness, numbness and headaches.   Hematological:  Negative for adenopathy.   Psychiatric/Behavioral:  Negative for agitation, sleep disturbance and suicidal ideas.    All other systems reviewed and are negative.        Past Medical History:   Diagnosis Date   • Anemia, iron deficiency     Drug Therapy, poor absorbtion documented   • Appendicitis    • Arthritis 01/25/2019   • Cardiac disease    • Colon cancer (HCC) 12/2013    Resected: BREANNA   • Congenital nystagmus     Left   • Gastroesophageal reflux disease without esophagitis 06/19/2020   • Hyperlipidemia    • Hypertension    • Inguinal hernia    • Sciatica 2010    resolved from problem list-2011   • Stage 3 chronic kidney disease, unspecified whether stage 3a or 3b CKD (HCC) 05/03/2023         Current Outpatient Medications:   •  amLODIPine (NORVASC) 2.5 mg tablet, TAKE 1 TABLET BY MOUTH DAILY @ 9A (HTN), Disp: 30 tablet, Rfl: 5  •  aspirin 81 mg chewable tablet, Chew 81 mg, Disp: , Rfl:   •  atorvastatin (LIPITOR) 20 mg tablet, TAKE 1 TABLET ONCE A DAY @  "9PM.(CHOLESTEROL), Disp: 30 tablet, Rfl: 5  •  clopidogrel (PLAVIX) 75 mg tablet, Take 75 mg by mouth daily, Disp: , Rfl:   •  finasteride (PROSCAR) 5 mg tablet, Take 1 tablet (5 mg total) by mouth daily, Disp: 30 tablet, Rfl: 0  •  folic acid (FOLVITE) 800 MCG tablet, Take 400 mcg by mouth daily, Disp: , Rfl:   •  levocetirizine (XYZAL) 5 MG tablet, Take 1 tablet (5 mg total) by mouth every evening, Disp: 7 tablet, Rfl: 0  •  lisinopril (ZESTRIL) 10 mg tablet, Take 1 tablet (10 mg total) by mouth daily, Disp: 90 tablet, Rfl: 1  •  metoprolol tartrate (LOPRESSOR) 100 mg tablet, Take 1 tablet (100 mg total) by mouth every 12 (twelve) hours, Disp: 180 tablet, Rfl: 1  •  Multiple Vitamins-Minerals (MULTIVITAL-M PO), Take 1 tablet by mouth, Disp: , Rfl:   •  omeprazole (PriLOSEC) 20 mg delayed release capsule, Take 20 mg by mouth daily, Disp: , Rfl:   •  tamsulosin (FLOMAX) 0.4 mg, Take 1 capsule (0.4 mg total) by mouth daily at bedtime, Disp: 30 capsule, Rfl: 0    Current Facility-Administered Medications:   •  aspirin chewable tablet 324 mg, 324 mg, Oral, Daily, Becca East PA-C, 324 mg at 11/05/21 0906    No Known Allergies    Social History   Past Surgical History:   Procedure Laterality Date   • APPENDECTOMY      Appendicitis   • CHOLECYSTECTOMY     • COLON SIGMOID RESECTION  12/2013   • COLON SURGERY     • CORONARY ARTERY BYPASS GRAFT      x 2   • CORONARY ARTERY BYPASS GRAFT      x2   • CORONARY ARTERY BYPASS GRAFT     • CORONARY ARTERY BYPASS GRAFT     • INGUINAL HERNIA REPAIR     • SHOULDER ARTHROCENTESIS Right     sub-acromial bursa area     Family History   Problem Relation Age of Onset   • Hypertension Father    • Heart disease Father    • Heart disease Mother    • Cancer Sister    • Cancer Brother        Objective:  /74 (BP Location: Left arm, Patient Position: Sitting, Cuff Size: Standard)   Pulse (!) 51   Temp 98.1 °F (36.7 °C) (Temporal)   Resp 18   Ht 5' 10\" (1.778 m)   Wt 77.5 kg (170 " lb 12.8 oz)   SpO2 100%   BMI 24.51 kg/m²     Recent Results (from the past 1344 hour(s))   Basic metabolic panel    Collection Time: 01/31/24 10:29 AM   Result Value Ref Range    Sodium 140 135 - 147 mmol/L    Potassium 4.2 3.5 - 5.3 mmol/L    Chloride 106 96 - 108 mmol/L    CO2 26 21 - 32 mmol/L    ANION GAP 8 mmol/L    BUN 21 5 - 25 mg/dL    Creatinine 1.23 0.60 - 1.30 mg/dL    Glucose, Fasting 108 (H) 65 - 99 mg/dL    Calcium 8.9 8.4 - 10.2 mg/dL    eGFR 51 ml/min/1.73sq m   CBC and Platelet    Collection Time: 02/16/24 10:08 AM   Result Value Ref Range    WBC 4.46 4.31 - 10.16 Thousand/uL    RBC 3.60 (L) 3.88 - 5.62 Million/uL    Hemoglobin 12.0 12.0 - 17.0 g/dL    Hematocrit 35.0 (L) 36.5 - 49.3 %    MCV 97 82 - 98 fL    MCH 33.3 26.8 - 34.3 pg    MCHC 34.3 31.4 - 37.4 g/dL    RDW 12.7 11.6 - 15.1 %    Platelets 192 149 - 390 Thousands/uL    MPV 10.5 8.9 - 12.7 fL   Urine culture    Collection Time: 03/10/24  7:39 AM    Specimen: Urine, Indwelling Mo Catheter   Result Value Ref Range    Urine Culture No Growth <1000 cfu/mL    UA (URINE) with reflex to Scope    Collection Time: 03/10/24  7:39 AM   Result Value Ref Range    Color, UA Light Yellow     Clarity, UA Clear     Specific Gravity, UA 1.008 1.003 - 1.030    pH, UA 6.5 4.5, 5.0, 5.5, 6.0, 6.5, 7.0, 7.5, 8.0    Leukocytes, UA Negative Negative    Nitrite, UA Negative Negative    Protein, UA Negative Negative mg/dl    Glucose, UA Negative Negative mg/dl    Ketones, UA Negative Negative mg/dl    Urobilinogen, UA <2.0 <2.0 mg/dl mg/dl    Bilirubin, UA Negative Negative    Occult Blood, UA Negative Negative   POCT Measure PVR    Collection Time: 03/18/24  3:41 PM   Result Value Ref Range    POST-VOID RESIDUAL VOLUME, ML POC 45 mL            Physical Exam  Vitals and nursing note reviewed.   Constitutional:       General: He is not in acute distress.     Appearance: He is well-developed. He is not diaphoretic.   HENT:      Head: Normocephalic and  atraumatic.   Eyes:      General: No scleral icterus.        Right eye: No discharge.         Left eye: No discharge.      Conjunctiva/sclera: Conjunctivae normal.      Pupils: Pupils are equal, round, and reactive to light.   Neck:      Thyroid: No thyromegaly.      Vascular: No JVD.   Cardiovascular:      Rate and Rhythm: Normal rate and regular rhythm.      Heart sounds: Normal heart sounds. No murmur heard.     No friction rub. No gallop.   Pulmonary:      Effort: Pulmonary effort is normal. No respiratory distress.      Breath sounds: Normal breath sounds. No wheezing or rales.   Chest:      Chest wall: No tenderness.   Abdominal:      General: Bowel sounds are normal. There is no distension.      Palpations: Abdomen is soft. There is no mass.      Tenderness: There is no abdominal tenderness. There is no guarding or rebound.   Musculoskeletal:         General: No tenderness or deformity. Normal range of motion.      Cervical back: Normal range of motion and neck supple.   Lymphadenopathy:      Cervical: No cervical adenopathy.   Skin:     General: Skin is warm and dry.      Coloration: Skin is not pale.      Findings: No erythema or rash.   Neurological:      Mental Status: He is alert and oriented to person, place, and time.      Cranial Nerves: No cranial nerve deficit.      Coordination: Coordination normal.      Deep Tendon Reflexes: Reflexes are normal and symmetric.   Psychiatric:         Behavior: Behavior normal.         Thought Content: Thought content normal.         Judgment: Judgment normal.

## 2024-03-21 NOTE — ASSESSMENT & PLAN NOTE
Lab Results   Component Value Date    EGFR 51 01/31/2024    EGFR 45 04/04/2023    EGFR 73 04/23/2022    CREATININE 1.23 01/31/2024    CREATININE 1.37 (H) 04/04/2023    CREATININE 0.92 04/23/2022   Continue to trend kidney function.

## 2024-03-21 NOTE — ASSESSMENT & PLAN NOTE
Controlled. Will discontinue HCTZ. Continue lisinopril 10 mg daily, metoprolol tartrate 100 mg BID, and norvasc 2.5 mg daily.

## 2024-03-27 ENCOUNTER — TELEMEDICINE (OUTPATIENT)
Dept: UROLOGY | Facility: AMBULATORY SURGERY CENTER | Age: 89
End: 2024-03-27
Payer: MEDICARE

## 2024-03-27 DIAGNOSIS — N40.0 ENLARGED PROSTATE: ICD-10-CM

## 2024-03-27 DIAGNOSIS — R33.8 ACUTE URINARY RETENTION: ICD-10-CM

## 2024-03-27 PROCEDURE — 99214 OFFICE O/P EST MOD 30 MIN: CPT

## 2024-03-27 RX ORDER — FINASTERIDE 5 MG/1
5 TABLET, FILM COATED ORAL DAILY
Qty: 90 TABLET | Refills: 3 | Status: SHIPPED | OUTPATIENT
Start: 2024-03-27 | End: 2025-03-22

## 2024-03-27 RX ORDER — TAMSULOSIN HYDROCHLORIDE 0.4 MG/1
0.4 CAPSULE ORAL
Qty: 90 CAPSULE | Refills: 3 | Status: SHIPPED | OUTPATIENT
Start: 2024-03-27 | End: 2025-03-22

## 2024-03-27 NOTE — PROGRESS NOTES
Virtual Brief Visit    This Visit is being completed by telephone. The Patient is located at Home and in the following state in which I hold an active license [place]    The patient was identified by name and date of birth. Jameel Benton was informed that this is a telemedicine visit and that the visit is being conducted through the [employer] Teams platform. He agrees to proceed..  My office door was closed. No one else was in the room.  He acknowledged consent and understanding of privacy and security of the video platform. The patient has agreed to participate and understands they can discontinue the visit at any time.    Patient is aware this is a billable service.       Assessment/Plan:  BPH with LUTS  -Currently taking Flomax and Finasteride, refills sent to his pharmacy  -PVR 45 mL on 3/18/2024 during void trial  -Follow up in 1 year with an in person office visit to assess PVR and overall urinary symptoms    Urinary Retention  -PVR on 3/18 45 mL with void trial  -History of urinary retention in the past  -Provided instructions to the patient that if he experiences abdominal pain or discomfort/suprapubic pain and not able to urinate for 6 to 8 hours he should initially call our office to see if he can be seen and have a bladder scan done at that time, instead of reporting to the ER, he is amenable to this plan  -Plan to follow-up in 1 year with an in person office visit to assess PVR and overall urinary symptoms    HPI:  Patient was at emergency room on 3/10/2024 and was unable to urinate at that time.  During the time at the ER he had stated that he had been taking finasteride and tamsulosin but he ran out of the medications and had not been taking them over the course of the week.  He had not been able to urinate for around 10 hours and had increasing lower abdominal pain.  He had a Mo catheter placed and it drained over 700 mL of clear yellow urine.  A UA and urine culture was sent at the time and no  infection was noted.    Today the patient reports that he has some burning with urination, but is not every time with urination.  We had a discussion about how the Mo catheter can cause urethral trauma which may take some time for healing.  I instructed him to increase his fluids and monitor his symptoms for infection.  He denies any nausea vomiting, fever or chills.  He states that he is able to empty out his bladder fully and he has no other urinary symptoms.     Problem List Items Addressed This Visit    None      Recent Visits  Date Type Provider Dept   03/21/24 Office Visit Jose Iniguez MD [facility]   Showing recent visits within past 7 days and meeting all other requirements  Future Appointments  No visits were found meeting these conditions.  Showing future appointments within next 150 days and meeting all other requirements         Visit Time  Total Visit Duration: 20 minutes

## 2024-04-26 ENCOUNTER — APPOINTMENT (OUTPATIENT)
Dept: LAB | Facility: IMAGING CENTER | Age: 89
End: 2024-04-26
Payer: MEDICARE

## 2024-04-26 ENCOUNTER — NURSE TRIAGE (OUTPATIENT)
Age: 89
End: 2024-04-26

## 2024-04-26 DIAGNOSIS — R30.0 DYSURIA: ICD-10-CM

## 2024-04-26 DIAGNOSIS — R30.0 DYSURIA: Primary | ICD-10-CM

## 2024-04-26 LAB

## 2024-04-26 PROCEDURE — 81001 URINALYSIS AUTO W/SCOPE: CPT

## 2024-04-26 PROCEDURE — 87086 URINE CULTURE/COLONY COUNT: CPT

## 2024-04-26 PROCEDURE — 87186 SC STD MICRODIL/AGAR DIL: CPT

## 2024-04-26 PROCEDURE — 87147 CULTURE TYPE IMMUNOLOGIC: CPT

## 2024-04-26 NOTE — TELEPHONE ENCOUNTER
Additional Information   Negative: The patient has persistent vomiting   Negative: The patient has severe uncontrolled pain   Negative: The patient has a fever of 101 or higher    Answer Questions - Initial Assessment  When did your symptoms start:Ongoing since shin removed a month and a half ago    Is burning with every void: no    Do you have any other urinary symptoms, urinary frequency, urgency, incontinence: no    Have you become unable to urinate: No: I do not feel as though I am retaining urine    Have you seen blood in your urine: no    Do you have any abdominal pain or flank pain: no    Do you have a fever of 101 or higher: no    Protocols used: Dysuria  Patient will go to [facility]'s lab tomorrow morning to have urine testing done and understands it may take up to 72 hours for results. Reviewed bladder irritants to avoid, and to stay hydrated with at least 64 oz. Of water/day as long as no fluid restrictions. ED precautions reviewed as well.

## 2024-04-28 LAB — BACTERIA UR CULT: ABNORMAL

## 2024-04-29 DIAGNOSIS — N39.0 URINARY TRACT INFECTION WITHOUT HEMATURIA, SITE UNSPECIFIED: Primary | ICD-10-CM

## 2024-04-29 RX ORDER — SULFAMETHOXAZOLE AND TRIMETHOPRIM 800; 160 MG/1; MG/1
1 TABLET ORAL EVERY 12 HOURS SCHEDULED
Qty: 14 TABLET | Refills: 0 | Status: SHIPPED | OUTPATIENT
Start: 2024-04-29 | End: 2024-05-06

## 2024-04-29 NOTE — TELEPHONE ENCOUNTER
Called Jameel bush notified him of new medication and that it was sent to [facility].  He also states he doesn't have a folic acid deficiency

## 2024-04-29 NOTE — TELEPHONE ENCOUNTER
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  I sent a 7-day course of Bactrim to his pharmacy.  He is okay to take this at normal regimen and his creatinine clearance is measured at 43 ml/min.  Please inquire and whether patient has folate deficiency as Bactrim or not be top choice if patient does.  No recorded history of folate deficiency but folic acid in patient's chart under outpatient medications

## 2024-04-30 DIAGNOSIS — I10 ESSENTIAL HYPERTENSION: ICD-10-CM

## 2024-04-30 NOTE — TELEPHONE ENCOUNTER
Reason for call:   [x] Refill   [] Prior Auth  [] Other:     Office:   [x] PCP/Provider - [facility] / [facility]  [] Specialty/Provider -     Medication: metoprolol tartrate    Dose/Frequency: 100mg q12    Quantity: 180    Pharmacy: [facility] - [place], [place] - [address] [facility]     Does the patient have enough for 3 days?   [x] Yes   [] No - Send as HP to POD

## 2024-05-01 RX ORDER — METOPROLOL TARTRATE 100 MG/1
100 TABLET ORAL EVERY 12 HOURS SCHEDULED
Qty: 180 TABLET | Refills: 1 | Status: SHIPPED | OUTPATIENT
Start: 2024-05-01

## 2024-05-11 ENCOUNTER — APPOINTMENT (OUTPATIENT)
Dept: LAB | Facility: IMAGING CENTER | Age: 89
End: 2024-05-11
Payer: MEDICARE

## 2024-05-11 DIAGNOSIS — E78.2 MIXED HYPERLIPIDEMIA: ICD-10-CM

## 2024-05-11 LAB
CHOLEST SERPL-MCNC: 75 MG/DL
HDLC SERPL-MCNC: 25 MG/DL
LDLC SERPL CALC-MCNC: 22 MG/DL (ref 0–100)
NONHDLC SERPL-MCNC: 50 MG/DL
TRIGL SERPL-MCNC: 139 MG/DL

## 2024-05-11 PROCEDURE — 36415 COLL VENOUS BLD VENIPUNCTURE: CPT

## 2024-05-11 PROCEDURE — 80061 LIPID PANEL: CPT

## 2024-06-03 DIAGNOSIS — E78.5 DYSLIPIDEMIA: ICD-10-CM

## 2024-06-03 RX ORDER — ATORVASTATIN CALCIUM 20 MG/1
20 TABLET, FILM COATED ORAL DAILY
Qty: 30 TABLET | Refills: 5 | Status: SHIPPED | OUTPATIENT
Start: 2024-06-03

## 2024-06-03 NOTE — TELEPHONE ENCOUNTER
Reason for call:   [x] Refill   [] Prior Auth  [] Other:     Office:   [x] PCP/Provider - Jose Iniguez MD   [] Specialty/Provider -     Medication: atorvastatin (LIPITOR) 20 mg tablet     Dose/Frequency: TAKE 1 TABLET ONCE A DAY @ 9PM.(CHOLESTEROL)    Quantity: 30 + 5 refills    Pharmacy: [facility] - [place] [address] [facility]     Does the patient have enough for 3 days?   [x] Yes   [] No - Send as HP to POD

## 2024-06-18 ENCOUNTER — APPOINTMENT (EMERGENCY)
Dept: RADIOLOGY | Facility: HOSPITAL | Age: 89
End: 2024-06-18
Payer: MEDICARE

## 2024-06-18 ENCOUNTER — HOSPITAL ENCOUNTER (EMERGENCY)
Facility: HOSPITAL | Age: 89
Discharge: HOME | End: 2024-06-18
Attending: EMERGENCY MEDICINE
Payer: MEDICARE

## 2024-06-18 VITALS
TEMPERATURE: 97.4 F | OXYGEN SATURATION: 96 % | RESPIRATION RATE: 17 BRPM | WEIGHT: 174.38 LBS | SYSTOLIC BLOOD PRESSURE: 164 MMHG | BODY MASS INDEX: 25.02 KG/M2 | DIASTOLIC BLOOD PRESSURE: 78 MMHG | HEART RATE: 50 BPM

## 2024-06-18 DIAGNOSIS — R07.9 CHEST PAIN WITH LOW RISK OF ACUTE CORONARY SYNDROME: Primary | ICD-10-CM

## 2024-06-18 LAB
2HR DELTA HS TROPONIN: -1 NG/L
ALBUMIN SERPL BCP-MCNC: 4.4 G/DL (ref 3.5–5)
ALP SERPL-CCNC: 133 U/L (ref 34–104)
ALT SERPL W P-5'-P-CCNC: 22 U/L (ref 7–52)
ANION GAP SERPL CALCULATED.3IONS-SCNC: 5 MMOL/L (ref 4–13)
AST SERPL W P-5'-P-CCNC: 25 U/L (ref 13–39)
ATRIAL RATE: 96 BPM
BASOPHILS # BLD AUTO: 0.02 THOUSANDS/ÂΜL (ref 0–0.1)
BASOPHILS NFR BLD AUTO: 0 % (ref 0–1)
BILIRUB SERPL-MCNC: 0.74 MG/DL (ref 0.2–1)
BUN SERPL-MCNC: 17 MG/DL (ref 5–25)
CALCIUM SERPL-MCNC: 8.7 MG/DL (ref 8.4–10.2)
CARDIAC TROPONIN I PNL SERPL HS: 7 NG/L
CARDIAC TROPONIN I PNL SERPL HS: 8 NG/L
CHLORIDE SERPL-SCNC: 108 MMOL/L (ref 96–108)
CO2 SERPL-SCNC: 23 MMOL/L (ref 21–32)
CREAT SERPL-MCNC: 1.02 MG/DL (ref 0.6–1.3)
EOSINOPHIL # BLD AUTO: 0.16 THOUSAND/ÂΜL (ref 0–0.61)
EOSINOPHIL NFR BLD AUTO: 3 % (ref 0–6)
ERYTHROCYTE [DISTWIDTH] IN BLOOD BY AUTOMATED COUNT: 12.9 % (ref 11.6–15.1)
GFR SERPL CREATININE-BSD FRML MDRD: 63 ML/MIN/1.73SQ M
GLUCOSE SERPL-MCNC: 90 MG/DL (ref 65–140)
HCT VFR BLD AUTO: 37.5 % (ref 36.5–49.3)
HGB BLD-MCNC: 12.2 G/DL (ref 12–17)
IMM GRANULOCYTES # BLD AUTO: 0.02 THOUSAND/UL (ref 0–0.2)
IMM GRANULOCYTES NFR BLD AUTO: 0 % (ref 0–2)
LYMPHOCYTES # BLD AUTO: 0.89 THOUSANDS/ÂΜL (ref 0.6–4.47)
LYMPHOCYTES NFR BLD AUTO: 18 % (ref 14–44)
MCH RBC QN AUTO: 32 PG (ref 26.8–34.3)
MCHC RBC AUTO-ENTMCNC: 32.5 G/DL (ref 31.4–37.4)
MCV RBC AUTO: 98 FL (ref 82–98)
MONOCYTES # BLD AUTO: 0.64 THOUSAND/ÂΜL (ref 0.17–1.22)
MONOCYTES NFR BLD AUTO: 13 % (ref 4–12)
NEUTROPHILS # BLD AUTO: 3.25 THOUSANDS/ÂΜL (ref 1.85–7.62)
NEUTS SEG NFR BLD AUTO: 66 % (ref 43–75)
NRBC BLD AUTO-RTO: 0 /100 WBCS
PLATELET # BLD AUTO: 145 THOUSANDS/UL (ref 149–390)
PMV BLD AUTO: 10.7 FL (ref 8.9–12.7)
POTASSIUM SERPL-SCNC: 4.1 MMOL/L (ref 3.5–5.3)
PROT SERPL-MCNC: 7.1 G/DL (ref 6.4–8.4)
QRS AXIS: 10 DEGREES
QRSD INTERVAL: 90 MS
QT INTERVAL: 472 MS
QTC INTERVAL: 475 MS
RBC # BLD AUTO: 3.81 MILLION/UL (ref 3.88–5.62)
SODIUM SERPL-SCNC: 136 MMOL/L (ref 135–147)
T WAVE AXIS: 38 DEGREES
VENTRICULAR RATE: 61 BPM
WBC # BLD AUTO: 4.98 THOUSAND/UL (ref 4.31–10.16)

## 2024-06-18 PROCEDURE — 85025 COMPLETE CBC W/AUTO DIFF WBC: CPT | Performed by: EMERGENCY MEDICINE

## 2024-06-18 PROCEDURE — 99285 EMERGENCY DEPT VISIT HI MDM: CPT | Performed by: EMERGENCY MEDICINE

## 2024-06-18 PROCEDURE — 93010 ELECTROCARDIOGRAM REPORT: CPT | Performed by: INTERNAL MEDICINE

## 2024-06-18 PROCEDURE — 99285 EMERGENCY DEPT VISIT HI MDM: CPT

## 2024-06-18 PROCEDURE — 80053 COMPREHEN METABOLIC PANEL: CPT | Performed by: EMERGENCY MEDICINE

## 2024-06-18 PROCEDURE — 96374 THER/PROPH/DIAG INJ IV PUSH: CPT

## 2024-06-18 PROCEDURE — 36415 COLL VENOUS BLD VENIPUNCTURE: CPT | Performed by: EMERGENCY MEDICINE

## 2024-06-18 PROCEDURE — 71046 X-RAY EXAM CHEST 2 VIEWS: CPT

## 2024-06-18 PROCEDURE — 93005 ELECTROCARDIOGRAM TRACING: CPT

## 2024-06-18 PROCEDURE — 84484 ASSAY OF TROPONIN QUANT: CPT | Performed by: EMERGENCY MEDICINE

## 2024-06-18 RX ORDER — BENZONATATE 100 MG/1
100 CAPSULE ORAL DAILY PRN
COMMUNITY

## 2024-06-18 RX ORDER — ACETAMINOPHEN 325 MG/1
650 TABLET ORAL ONCE
Status: COMPLETED | OUTPATIENT
Start: 2024-06-18 | End: 2024-06-18

## 2024-06-18 RX ORDER — FAMOTIDINE 40 MG/1
40 TABLET, FILM COATED ORAL
COMMUNITY

## 2024-06-18 RX ORDER — FAMOTIDINE 10 MG/ML
20 INJECTION, SOLUTION INTRAVENOUS ONCE
Status: COMPLETED | OUTPATIENT
Start: 2024-06-18 | End: 2024-06-18

## 2024-06-18 RX ADMIN — FAMOTIDINE 20 MG: 10 INJECTION, SOLUTION INTRAVENOUS at 12:08

## 2024-06-18 RX ADMIN — ACETAMINOPHEN 650 MG: 325 TABLET, FILM COATED ORAL at 12:07

## 2024-06-18 NOTE — ED NOTES
Pt transport is for 1700- pt would prefer to leave sooner and is attempting to contact family for a ride. Pt will notify oncoming RN of status.      Marita Staley RN  06/18/24 [id number]

## 2024-06-18 NOTE — ED PROVIDER NOTES
History  Chief Complaint   Patient presents with    Chest Pain     Chest pain around 0530 this morning on left side of chest that pt states is pressure and does not subside. 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  He say it is not severe, and not radiating.  He ate breakfast, took his morning medications, and the care manager opted to call EMS after he mentioned he was having pain.        History provided by:  Patient  Chest Pain  Pain location:  L chest      Prior to Admission Medications   Prescriptions Last Dose Informant Patient Reported? Taking?   Multiple Vitamins-Minerals (MULTIVITAL-M PO) Not Taking Self Yes No   Sig: Take 1 tablet by mouth   Patient not taking: Reported on 6/18/2024   amLODIPine (NORVASC) 2.5 mg tablet   No Yes   Sig: TAKE 1 TABLET BY MOUTH DAILY @ 9A (HTN)   aspirin 81 mg chewable tablet  Self Yes Yes   Sig: Chew 81 mg   atorvastatin (LIPITOR) 20 mg tablet   No Yes   Sig: Take 1 tablet (20 mg total) by mouth daily   benzonatate (TESSALON PERLES) 100 mg capsule   Yes Yes   Sig: Take 100 mg by mouth daily as needed for cough   clopidogrel (PLAVIX) 75 mg tablet  Self Yes Yes   Sig: Take 75 mg by mouth daily   famotidine (PEPCID) 40 MG tablet   Yes Yes   Sig: Take 40 mg by mouth daily at bedtime   finasteride (PROSCAR) 5 mg tablet   No Yes   Sig: Take 1 tablet (5 mg total) by mouth daily   folic acid (FOLVITE) 800 MCG tablet Not Taking Self Yes No   Sig: Take 400 mcg by mouth daily   Patient not taking: Reported on 6/18/2024   levocetirizine (XYZAL) 5 MG tablet  Self No Yes   Sig: Take 1 tablet (5 mg total) by mouth every evening   lisinopril (ZESTRIL) 10 mg tablet  Self No Yes   Sig: Take 1 tablet (10 mg total) by mouth daily   metoprolol tartrate (LOPRESSOR) 100 mg tablet    No Yes   Sig: Take 1 tablet (100 mg total) by mouth every 12 (twelve) hours   omeprazole (PriLOSEC) 20 mg delayed release capsule Not Taking Self Yes No   Sig: Take 20 mg by mouth daily   Patient not taking: Reported on 6/18/2024   tamsulosin (FLOMAX) 0.4 mg   No Yes   Sig: Take 1 capsule (0.4 mg total) by mouth daily at bedtime      Facility-Administered Medications Last Administration Doses Remaining   aspirin chewable tablet 324 mg 11/5/2021  9:06 AM           Past Medical History:   Diagnosis Date    Anemia, iron deficiency     Drug Therapy, poor absorbtion documented    Appendicitis     Arthritis 01/25/2019    Cardiac disease     Colon cancer (HCC) 12/2013    Resected: BREANNA    Congenital nystagmus     Left    Gastroesophageal reflux disease without esophagitis 06/19/2020    Hyperlipidemia     Hypertension     Inguinal hernia     Sciatica 2010    resolved from problem list-2011    Stage 3 chronic kidney disease, unspecified whether stage 3a or 3b CKD (HCC) 05/03/2023       Past Surgical History:   Procedure Laterality Date    APPENDECTOMY      Appendicitis    CHOLECYSTECTOMY      COLON SIGMOID RESECTION  12/2013    COLON SURGERY      CORONARY ARTERY BYPASS GRAFT      x 2    CORONARY ARTERY BYPASS GRAFT      x2    CORONARY ARTERY BYPASS GRAFT      CORONARY ARTERY BYPASS GRAFT      INGUINAL HERNIA REPAIR      SHOULDER ARTHROCENTESIS Right     sub-acromial bursa area       Family History   Problem Relation Age of Onset    Hypertension Father     Heart disease Father     Heart disease Mother     Cancer Sister     Cancer Brother      I have reviewed and agree with the history as documented.    E-Cigarette/Vaping    E-Cigarette Use Never User      E-Cigarette/Vaping Substances    Nicotine No     THC No     CBD No     Flavoring No     Other No     Unknown No      Social History     Tobacco Use    Smoking status: Never    Smokeless tobacco: Never   Vaping Use    Vaping status: Never Used   Substance Use Topics    Alcohol  use: No    Drug use: No       Review of Systems   Cardiovascular:  Positive for chest pain.       Physical Exam  Physical Exam  Vitals and nursing note reviewed.   Constitutional:       Appearance: He is well-developed.   HENT:      Head: Normocephalic and atraumatic.      Right Ear: External ear normal.      Left Ear: External ear normal.      Nose: Nose normal.      Mouth/Throat:      Mouth: Mucous membranes are moist.   Eyes:      Conjunctiva/sclera: Conjunctivae normal.      Pupils: Pupils are equal, round, and reactive to light.   Cardiovascular:      Rate and Rhythm: Normal rate.   Pulmonary:      Effort: Pulmonary effort is normal.   Abdominal:      Tenderness: There is no abdominal tenderness.   Musculoskeletal:         General: Normal range of motion.      Cervical back: Normal range of motion and neck supple.   Skin:     General: Skin is warm and dry.      Capillary Refill: Capillary refill takes less than 2 seconds.   Neurological:      Mental Status: He is alert and oriented to person, place, and time.      Cranial Nerves: No cranial nerve deficit.      Coordination: Coordination normal.   Psychiatric:         Behavior: Behavior normal.         Thought Content: Thought content normal.         Judgment: Judgment normal.         Vital Signs  ED Triage Vitals   Temperature Pulse Respirations Blood Pressure SpO2   06/18/24 1207 06/18/24 1143 06/18/24 1143 06/18/24 1143 06/18/24 1143   (!) 97.4 °F (36.3 °C) 66 16 (!) 178/85 98 %      Temp Source Heart Rate Source Patient Position - Orthostatic VS BP Location FiO2 (%)   06/18/24 1207 06/18/24 1143 06/18/24 1143 06/18/24 1143 --   Oral Monitor Sitting Right arm       Pain Score       06/18/24 1141       4           Vitals:    06/18/24 1143 06/18/24 1300 06/18/24 1400   BP: (!) 178/85 (!) 179/85 164/78   Pulse: 66 (!) 52 (!) 50   Patient Position - Orthostatic VS: Sitting Sitting          Visual Acuity      ED Medications  Medications   acetaminophen (TYLENOL)  tablet 650 mg (650 mg Oral Given 6/18/24 1207)   Famotidine (PF) (PEPCID) injection 20 mg (20 mg Intravenous Given 6/18/24 1208)       Diagnostic Studies  Results Reviewed       Procedure Component Value Units Date/Time    HS Troponin I 2hr [[id number]]  (Normal) Collected: 06/18/24 1401    Lab Status: Final result Specimen: Blood from Hand, Left Updated: 06/18/24 1428     hs TnI 2hr 7 ng/L      Delta 2hr hsTnI -1 ng/L     Comprehensive metabolic panel [[id number]]  (Abnormal) Collected: 06/18/24 1205    Lab Status: Final result Specimen: Blood from Arm, Left Updated: 06/18/24 1238     Sodium 136 mmol/L      Potassium 4.1 mmol/L      Chloride 108 mmol/L      CO2 23 mmol/L      ANION GAP 5 mmol/L      BUN 17 mg/dL      Creatinine 1.02 mg/dL      Glucose 90 mg/dL      Calcium 8.7 mg/dL      AST 25 U/L      ALT 22 U/L      Alkaline Phosphatase 133 U/L      Total Protein 7.1 g/dL      Albumin 4.4 g/dL      Total Bilirubin 0.74 mg/dL      eGFR 63 ml/min/1.73sq m     Narrative:      National Kidney Disease Foundation guidelines for Chronic Kidney Disease (CKD):     Stage 1 with normal or high GFR (GFR > 90 mL/min/1.73 square meters)    Stage 2 Mild CKD (GFR = 60-89 mL/min/1.73 square meters)    Stage 3A Moderate CKD (GFR = 45-59 mL/min/1.73 square meters)    Stage 3B Moderate CKD (GFR = 30-44 mL/min/1.73 square meters)    Stage 4 Severe CKD (GFR = 15-29 mL/min/1.73 square meters)    Stage 5 End Stage CKD (GFR <15 mL/min/1.73 square meters)  Note: GFR calculation is accurate only with a steady state creatinine    HS Troponin 0hr (reflex protocol) [[id number]]  (Normal) Collected: 06/18/24 1205    Lab Status: Final result Specimen: Blood from Arm, Left Updated: 06/18/24 1236     hs TnI 0hr 8 ng/L     HS Troponin I 4hr [[id number]]     Lab Status: No result Specimen: Blood     CBC and differential [[id number]]  (Abnormal) Collected: 06/18/24 1205    Lab Status: Final result Specimen: Blood from Arm, Left Updated: 06/18/24 1218      WBC 4.98 Thousand/uL      RBC 3.81 Million/uL      Hemoglobin 12.2 g/dL      Hematocrit 37.5 %      MCV 98 fL      MCH 32.0 pg      MCHC 32.5 g/dL      RDW 12.9 %      MPV 10.7 fL      Platelets 145 Thousands/uL      nRBC 0 /100 WBCs      Segmented % 66 %      Immature Grans % 0 %      Lymphocytes % 18 %      Monocytes % 13 %      Eosinophils Relative 3 %      Basophils Relative 0 %      Absolute Neutrophils 3.25 Thousands/µL      Absolute Immature Grans 0.02 Thousand/uL      Absolute Lymphocytes 0.89 Thousands/µL      Absolute Monocytes 0.64 Thousand/µL      Eosinophils Absolute 0.16 Thousand/µL      Basophils Absolute 0.02 Thousands/µL                    XR chest 2 views   Final Result by Erik Martinez MD (06/18 1644)      No acute cardiopulmonary disease.            Workstation performed: [id number]                    Procedures  ECG 12 Lead Documentation Only    Date/Time: 6/18/2024 12:08 PM    Performed by: Barry Simmons MD  Authorized by: Barry Simmons MD    Rate:     ECG rate:  61    ECG rate assessment: normal    Rhythm:     Rhythm: other rhythm      Rhythm comment:  Irregular, possibly afib  Ectopy:     Ectopy comment:  Possibly PAC           ED Course  ED Course as of 06/18/24 1723   Tue Jun 18, 2024   1253 hs TnI 0hr: 8  Elevated over 5, by protocol serial troponin indicated   1314 XR chest 2 views  My independent interpretation of imaging:   No acute findings      1356 Reviewed results with patient at bedside and updated on the plan.  He reports resolution of discomfort. He is tolerating demi.  ED workup is reassuring for what is atypical chest discomfort . I do not suspect cardiac source.  He can be safely discharged if repeat troponin is normal.     1431 hs TnI 2hr: 7  Serial troponin is not c/w cardiac source             HEART Risk Score      Flowsheet Row Most Recent Value   Heart Score Risk Calculator    History 0 Filed at: 06/18/2024 1358   ECG 0 Filed at: 06/18/2024 1358   Age 2  Filed at: 06/18/2024 1358   Risk Factors 2 Filed at: 06/18/2024 1358   Troponin 0 Filed at: 06/18/2024 1358   HEART Score 4 Filed at: 06/18/2024 1358                          SBIRT 20yo+      Flowsheet Row Most Recent Value   Initial Alcohol Screen: US AUDIT-C     1. How often do you have a drink containing alcohol? 1 Filed at: 06/18/2024 1214   2. How many drinks containing alcohol do you have on a typical day you are drinking?  1 Filed at: 06/18/2024 1214   3a. Male UNDER 65: How often do you have five or more drinks on one occasion? 0 Filed at: 06/18/2024 1214   3b. FEMALE Any Age, or MALE 65+: How often do you have 4 or more drinks on one occassion? 0 Filed at: 06/18/2024 1214   Audit-C Score 2 Filed at: 06/18/2024 1214   CRISTIANE: How many times in the past year have you...    Used an illegal drug or used a prescription medication for non-medical reasons? Never Filed at: 06/18/2024 1214                      Medical Decision Making  Amount and/or Complexity of Data Reviewed  Labs: ordered. Decision-making details documented in ED Course.  Radiology: ordered. Decision-making details documented in ED Course.    Risk  OTC drugs.  Prescription drug management.             Disposition  Final diagnoses:   Chest pain with low risk of acute coronary syndrome     Time reflects when diagnosis was documented in both MDM as applicable and the Disposition within this note       Time User Action Codes Description Comment    6/18/2024  2:32 PM Barry Simmons Add [R07.9] Chest pain with low risk of acute coronary syndrome           ED Disposition       ED Disposition   Discharge    Condition   Good    Date/Time   Tue Jun 18, 2024 1431    Comment   Jameel Benton discharge to home/self care.                   Follow-up Information       Follow up With Specialties Details Why Contact Info    Jose Iniguez MD Internal Medicine Go to  As needed [address] [address] [phone]              Discharge Medication  List as of 6/18/2024  2:33 PM        CONTINUE these medications which have NOT CHANGED    Details   amLODIPine (NORVASC) 2.5 mg tablet TAKE 1 TABLET BY MOUTH DAILY @ 9A (HTN), Normal      aspirin 81 mg chewable tablet Chew 81 mg, Historical Med      atorvastatin (LIPITOR) 20 mg tablet Take 1 tablet (20 mg total) by mouth daily, Starting Mon 6/3/2024, Normal      benzonatate (TESSALON PERLES) 100 mg capsule Take 100 mg by mouth daily as needed for cough, Historical Med      clopidogrel (PLAVIX) 75 mg tablet Take 75 mg by mouth daily, Starting Fri 2/23/2024, Until Sat 2/22/2025, Historical Med      famotidine (PEPCID) 40 MG tablet Take 40 mg by mouth daily at bedtime, Historical Med      finasteride (PROSCAR) 5 mg tablet Take 1 tablet (5 mg total) by mouth daily, Starting Wed 3/27/2024, Until Sat 3/22/2025, Normal      levocetirizine (XYZAL) 5 MG tablet Take 1 tablet (5 mg total) by mouth every evening, Starting Wed 8/2/2023, Normal      lisinopril (ZESTRIL) 10 mg tablet Take 1 tablet (10 mg total) by mouth daily, Starting Wed 12/27/2023, Normal      metoprolol tartrate (LOPRESSOR) 100 mg tablet Take 1 tablet (100 mg total) by mouth every 12 (twelve) hours, Starting Wed 5/1/2024, Normal      tamsulosin (FLOMAX) 0.4 mg Take 1 capsule (0.4 mg total) by mouth daily at bedtime, Starting Wed 3/27/2024, Until Sat 3/22/2025, Normal      folic acid (FOLVITE) 800 MCG tablet Take 400 mcg by mouth daily, Historical Med      Multiple Vitamins-Minerals (MULTIVITAL-M PO) Take 1 tablet by mouth, Historical Med      omeprazole (PriLOSEC) 20 mg delayed release capsule Take 20 mg by mouth daily, Historical Med             No discharge procedures on file.    PDMP Review       None            ED Provider  Electronically Signed by             Barry Simmons MD  06/18/24 9193

## 2024-06-18 NOTE — DISCHARGE INSTRUCTIONS
ED tests were reassuring with no findings to suggest a cardiac abnormality.  Continue Medications as ordered.  Return as needed for recurrent chest pain, shortness of breath, uncontrolled vomiting, or other new concerns.

## 2024-06-19 LAB
ATRIAL RATE: 93 BPM
P AXIS: 62 DEGREES
QRS AXIS: 6 DEGREES
QRSD INTERVAL: 94 MS
QT INTERVAL: 494 MS
QTC INTERVAL: 501 MS
T WAVE AXIS: 41 DEGREES
VENTRICULAR RATE: 62 BPM

## 2024-06-19 PROCEDURE — 93010 ELECTROCARDIOGRAM REPORT: CPT | Performed by: STUDENT IN AN ORGANIZED HEALTH CARE EDUCATION/TRAINING PROGRAM

## 2024-06-20 DIAGNOSIS — I10 ESSENTIAL HYPERTENSION: ICD-10-CM

## 2024-06-20 NOTE — TELEPHONE ENCOUNTER
Reason for call:   [x] Refill   [] Prior Auth  [] Other:     Office:   [x] PCP/Provider - Jose Iniguez MD   [] Specialty/Provider -     Medication: lisinopril (ZESTRIL) 10 mg tablet     Dose/Frequency:     10 mg, Oral, Daily       Quantity: 90    Pharmacy: [facility] - [place], [place] - [address]     Does the patient have enough for 3 days?   [x] Yes   [] No - Send as HP to POD

## 2024-06-21 RX ORDER — LISINOPRIL 10 MG/1
10 TABLET ORAL DAILY
Qty: 90 TABLET | Refills: 1 | Status: SHIPPED | OUTPATIENT
Start: 2024-06-21

## 2024-07-10 ENCOUNTER — APPOINTMENT (OUTPATIENT)
Dept: LAB | Facility: IMAGING CENTER | Age: 89
End: 2024-07-10
Payer: MEDICARE

## 2024-07-10 DIAGNOSIS — E78.5 DYSLIPIDEMIA: ICD-10-CM

## 2024-07-10 DIAGNOSIS — N18.30 STAGE 3 CHRONIC KIDNEY DISEASE, UNSPECIFIED WHETHER STAGE 3A OR 3B CKD (HCC): ICD-10-CM

## 2024-07-10 DIAGNOSIS — I10 ESSENTIAL HYPERTENSION: ICD-10-CM

## 2024-07-10 LAB
ALBUMIN SERPL BCG-MCNC: 4.2 G/DL (ref 3.5–5)
ALP SERPL-CCNC: 123 U/L (ref 34–104)
ALT SERPL W P-5'-P-CCNC: 19 U/L (ref 7–52)
ANION GAP SERPL CALCULATED.3IONS-SCNC: 7 MMOL/L (ref 4–13)
AST SERPL W P-5'-P-CCNC: 22 U/L (ref 13–39)
BILIRUB SERPL-MCNC: 0.87 MG/DL (ref 0.2–1)
BUN SERPL-MCNC: 15 MG/DL (ref 5–25)
CALCIUM SERPL-MCNC: 8.3 MG/DL (ref 8.4–10.2)
CHLORIDE SERPL-SCNC: 107 MMOL/L (ref 96–108)
CHOLEST SERPL-MCNC: 77 MG/DL
CO2 SERPL-SCNC: 26 MMOL/L (ref 21–32)
CREAT SERPL-MCNC: 1.08 MG/DL (ref 0.6–1.3)
ERYTHROCYTE [DISTWIDTH] IN BLOOD BY AUTOMATED COUNT: 13.2 % (ref 11.6–15.1)
GFR SERPL CREATININE-BSD FRML MDRD: 59 ML/MIN/1.73SQ M
GLUCOSE P FAST SERPL-MCNC: 101 MG/DL (ref 65–99)
HCT VFR BLD AUTO: 37.8 % (ref 36.5–49.3)
HDLC SERPL-MCNC: 28 MG/DL
HGB BLD-MCNC: 12.5 G/DL (ref 12–17)
LDLC SERPL CALC-MCNC: 25 MG/DL (ref 0–100)
MCH RBC QN AUTO: 32.7 PG (ref 26.8–34.3)
MCHC RBC AUTO-ENTMCNC: 33.1 G/DL (ref 31.4–37.4)
MCV RBC AUTO: 99 FL (ref 82–98)
NONHDLC SERPL-MCNC: 49 MG/DL
PLATELET # BLD AUTO: 157 THOUSANDS/UL (ref 149–390)
PMV BLD AUTO: 11.1 FL (ref 8.9–12.7)
POTASSIUM SERPL-SCNC: 4.6 MMOL/L (ref 3.5–5.3)
PROT SERPL-MCNC: 6.5 G/DL (ref 6.4–8.4)
RBC # BLD AUTO: 3.82 MILLION/UL (ref 3.88–5.62)
SODIUM SERPL-SCNC: 140 MMOL/L (ref 135–147)
TRIGL SERPL-MCNC: 122 MG/DL
WBC # BLD AUTO: 4.82 THOUSAND/UL (ref 4.31–10.16)

## 2024-07-10 PROCEDURE — 80053 COMPREHEN METABOLIC PANEL: CPT

## 2024-07-10 PROCEDURE — 36415 COLL VENOUS BLD VENIPUNCTURE: CPT

## 2024-07-10 PROCEDURE — 85027 COMPLETE CBC AUTOMATED: CPT

## 2024-07-10 PROCEDURE — 80061 LIPID PANEL: CPT

## 2024-07-17 ENCOUNTER — TELEPHONE (OUTPATIENT)
Dept: INTERNAL MEDICINE CLINIC | Facility: OTHER | Age: 89
End: 2024-07-17

## 2024-07-17 ENCOUNTER — OFFICE VISIT (OUTPATIENT)
Dept: INTERNAL MEDICINE CLINIC | Facility: OTHER | Age: 89
End: 2024-07-17
Payer: MEDICARE

## 2024-07-17 VITALS
SYSTOLIC BLOOD PRESSURE: 118 MMHG | HEIGHT: 70 IN | DIASTOLIC BLOOD PRESSURE: 76 MMHG | WEIGHT: 171 LBS | BODY MASS INDEX: 24.48 KG/M2 | TEMPERATURE: 97.7 F | OXYGEN SATURATION: 98 % | HEART RATE: 45 BPM | RESPIRATION RATE: 20 BRPM

## 2024-07-17 DIAGNOSIS — N18.30 STAGE 3 CHRONIC KIDNEY DISEASE, UNSPECIFIED WHETHER STAGE 3A OR 3B CKD (HCC): ICD-10-CM

## 2024-07-17 DIAGNOSIS — I10 ESSENTIAL HYPERTENSION: Primary | ICD-10-CM

## 2024-07-17 DIAGNOSIS — N40.1 BPH WITH OBSTRUCTION/LOWER URINARY TRACT SYMPTOMS: ICD-10-CM

## 2024-07-17 DIAGNOSIS — R07.9 CHEST PAIN, UNSPECIFIED TYPE: ICD-10-CM

## 2024-07-17 DIAGNOSIS — K21.9 GASTROESOPHAGEAL REFLUX DISEASE WITHOUT ESOPHAGITIS: Chronic | ICD-10-CM

## 2024-07-17 DIAGNOSIS — N13.8 BPH WITH OBSTRUCTION/LOWER URINARY TRACT SYMPTOMS: ICD-10-CM

## 2024-07-17 PROCEDURE — 93000 ELECTROCARDIOGRAM COMPLETE: CPT | Performed by: INTERNAL MEDICINE

## 2024-07-17 PROCEDURE — 99214 OFFICE O/P EST MOD 30 MIN: CPT | Performed by: INTERNAL MEDICINE

## 2024-07-17 RX ORDER — FAMOTIDINE 20 MG/1
20 TABLET, FILM COATED ORAL
Qty: 30 TABLET | Refills: 0 | Status: SHIPPED | OUTPATIENT
Start: 2024-07-17 | End: 2024-07-26

## 2024-07-17 NOTE — PROGRESS NOTES
" Assessment/Plan:    Essential hypertension  Controlled.  Continue current antihypertensive regimen.    Atrial fibrillation (HCC)  I recommended he contact his cardiologist which she will be doing today.  I will also be reaching out to his cardiologist .    Stage 3 chronic kidney disease, unspecified whether stage 3a or 3b CKD (HCC)  Lab Results   Component Value Date    EGFR 59 07/10/2024    EGFR 63 06/18/2024    EGFR 51 01/31/2024    CREATININE 1.08 07/10/2024    CREATININE 1.02 06/18/2024    CREATININE 1.23 01/31/2024   Stable.  Continue to trend kidney function.    BPH with obstruction/lower urinary tract symptoms  Controlled with Flomax and finasteride.       Diagnoses and all orders for this visit:    Essential hypertension    Gastroesophageal reflux disease without esophagitis  -     famotidine (PEPCID) 20 mg tablet; Take 1 tablet (20 mg total) by mouth daily at bedtime as needed for heartburn or indigestion    Stage 3 chronic kidney disease, unspecified whether stage 3a or 3b CKD (HCC)    BPH with obstruction/lower urinary tract symptoms    Chest pain, unspecified type  -     POCT ECG                  Subjective:      Patient ID: Jameel Benton is a 89 y.o. male.    Chief Complaint   Patient presents with   • Follow-up     6 month -labs done 7/10/24 -    • hm     Nothing due       Jameel Benton is seen today for follow up of chronic conditions.   Recent laboratory studies reviewed today with the patient.   he has been compliant with his medication regimen.   He has been experiencing recurrent left sided chest pain, since 3 weeks.  He went to the emergency department and underwent cardiac evaluation to which was negative.  EKG then showed \"Sinus rhythm with 2nd degree A-V block (Mobitz I)\". Chest pain has been occurring a few times a week, occurring at rest.  EKG today showed \"  he has no complaints or concerns at this time.       Heartburn  He reports no abdominal pain, no chest pain, no choking, no " coughing, no nausea, no sore throat or no wheezing. This is a chronic problem. The current episode started more than 1 year ago. The problem occurs occasionally. The problem has been unchanged. Pertinent negatives include no fatigue. He has tried a PPI for the symptoms. The treatment provided moderate relief.   Hypertension  This is a chronic problem. The current episode started more than 1 year ago. The problem is unchanged. The problem is controlled. Pertinent negatives include no chest pain, headaches, palpitations or shortness of breath. Past treatments include ACE inhibitors, calcium channel blockers and beta blockers. The current treatment provides moderate improvement. There are no compliance problems.  There is no history of chronic renal disease.   Hyperlipidemia  This is a chronic problem. The current episode started more than 1 year ago. The problem is controlled. Recent lipid tests were reviewed and are normal. He has no history of chronic renal disease. Pertinent negatives include no chest pain or shortness of breath. Current antihyperlipidemic treatment includes statins. The current treatment provides moderate improvement of lipids. There are no compliance problems.        The following portions of the patient's history were reviewed and updated as appropriate: allergies, current medications, past family history, past medical history, past social history, past surgical history, and problem list.    Review of Systems   Constitutional:  Negative for activity change, appetite change, chills, diaphoresis, fatigue and fever.   HENT:  Negative for congestion, postnasal drip, rhinorrhea, sinus pressure, sinus pain, sneezing and sore throat.    Eyes:  Negative for visual disturbance.   Respiratory:  Negative for apnea, cough, choking, chest tightness, shortness of breath and wheezing.    Cardiovascular:  Negative for chest pain, palpitations and leg swelling.   Gastrointestinal:  Negative for abdominal  distention, abdominal pain, anal bleeding, blood in stool, constipation, diarrhea, nausea and vomiting.   Endocrine: Negative for cold intolerance and heat intolerance.   Genitourinary:  Negative for difficulty urinating, dysuria and hematuria.   Musculoskeletal: Negative.    Skin: Negative.    Neurological:  Negative for dizziness, weakness, light-headedness, numbness and headaches.   Hematological:  Negative for adenopathy.   Psychiatric/Behavioral:  Negative for agitation, sleep disturbance and suicidal ideas.    All other systems reviewed and are negative.        Past Medical History:   Diagnosis Date   • Anemia, iron deficiency     Drug Therapy, poor absorbtion documented   • Appendicitis    • Arthritis 01/25/2019   • Cardiac disease    • Colon cancer (HCC) 12/2013    Resected: BREANNA   • Congenital nystagmus     Left   • Gastroesophageal reflux disease without esophagitis 06/19/2020   • Hyperlipidemia    • Hypertension    • Inguinal hernia    • Sciatica 2010    resolved from problem list-2011   • Stage 3 chronic kidney disease, unspecified whether stage 3a or 3b CKD (HCC) 05/03/2023         Current Outpatient Medications:   •  amLODIPine (NORVASC) 2.5 mg tablet, TAKE 1 TABLET BY MOUTH DAILY @ 9A (HTN), Disp: 30 tablet, Rfl: 5  •  aspirin 81 mg chewable tablet, Chew 81 mg, Disp: , Rfl:   •  atorvastatin (LIPITOR) 20 mg tablet, Take 1 tablet (20 mg total) by mouth daily, Disp: 30 tablet, Rfl: 5  •  clopidogrel (PLAVIX) 75 mg tablet, Take 75 mg by mouth daily, Disp: , Rfl:   •  famotidine (PEPCID) 20 mg tablet, Take 1 tablet (20 mg total) by mouth daily at bedtime as needed for heartburn or indigestion, Disp: 30 tablet, Rfl: 0  •  finasteride (PROSCAR) 5 mg tablet, Take 1 tablet (5 mg total) by mouth daily, Disp: 90 tablet, Rfl: 3  •  folic acid (FOLVITE) 800 MCG tablet, Take 400 mcg by mouth daily, Disp: , Rfl:   •  lisinopril (ZESTRIL) 10 mg tablet, Take 1 tablet (10 mg total) by mouth daily, Disp: 90 tablet, Rfl:  "1  •  metoprolol tartrate (LOPRESSOR) 100 mg tablet, Take 1 tablet (100 mg total) by mouth every 12 (twelve) hours, Disp: 180 tablet, Rfl: 1  •  Multiple Vitamins-Minerals (MULTIVITAL-M PO), Take 1 tablet by mouth, Disp: , Rfl:   •  omeprazole (PriLOSEC) 20 mg delayed release capsule, Take 20 mg by mouth daily in the early morning, Disp: , Rfl:   •  tamsulosin (FLOMAX) 0.4 mg, Take 1 capsule (0.4 mg total) by mouth daily at bedtime, Disp: 90 capsule, Rfl: 3    Current Facility-Administered Medications:   •  aspirin chewable tablet 324 mg, 324 mg, Oral, Daily, Becca East PA-C, 324 mg at 11/05/21 0906    No Known Allergies    Social History   Past Surgical History:   Procedure Laterality Date   • APPENDECTOMY      Appendicitis   • CHOLECYSTECTOMY     • COLON SIGMOID RESECTION  12/2013   • COLON SURGERY     • CORONARY ARTERY BYPASS GRAFT      x 2   • CORONARY ARTERY BYPASS GRAFT      x2   • CORONARY ARTERY BYPASS GRAFT     • CORONARY ARTERY BYPASS GRAFT     • INGUINAL HERNIA REPAIR     • SHOULDER ARTHROCENTESIS Right     sub-acromial bursa area     Family History   Problem Relation Age of Onset   • Hypertension Father    • Heart disease Father    • Heart disease Mother    • Cancer Sister    • Cancer Brother        Objective:  /76 (BP Location: Left arm, Patient Position: Sitting, Cuff Size: Standard)   Pulse (!) 45   Temp 97.7 °F (36.5 °C) (Oral)   Resp 20   Ht 5' 10\" (1.778 m)   Wt 77.6 kg (171 lb)   SpO2 98%   BMI 24.54 kg/m²     Recent Results (from the past 1344 hour(s))   ECG 12 lead    Collection Time: 06/18/24 11:47 AM   Result Value Ref Range    Ventricular Rate 61 BPM    Atrial Rate 96 BPM    NV Interval  ms    QRSD Interval 90 ms    QT Interval 472 ms    QTC Interval 475 ms    P Axis  degrees    QRS Axis 10 degrees    T Wave Axis 38 degrees   CBC and differential    Collection Time: 06/18/24 12:05 PM   Result Value Ref Range    WBC 4.98 4.31 - 10.16 Thousand/uL    RBC 3.81 (L) 3.88 - 5.62 " "Million/uL    Hemoglobin 12.2 12.0 - 17.0 g/dL    Hematocrit 37.5 36.5 - 49.3 %    MCV 98 82 - 98 fL    MCH 32.0 26.8 - 34.3 pg    MCHC 32.5 31.4 - 37.4 g/dL    RDW 12.9 11.6 - 15.1 %    MPV 10.7 8.9 - 12.7 fL    Platelets 145 (L) 149 - 390 Thousands/uL    nRBC 0 /100 WBCs    Segmented % 66 43 - 75 %    Immature Grans % 0 0 - 2 %    Lymphocytes % 18 14 - 44 %    Monocytes % 13 (H) 4 - 12 %    Eosinophils Relative 3 0 - 6 %    Basophils Relative 0 0 - 1 %    Absolute Neutrophils 3.25 1.85 - 7.62 Thousands/µL    Absolute Immature Grans 0.02 0.00 - 0.20 Thousand/uL    Absolute Lymphocytes 0.89 0.60 - 4.47 Thousands/µL    Absolute Monocytes 0.64 0.17 - 1.22 Thousand/µL    Eosinophils Absolute 0.16 0.00 - 0.61 Thousand/µL    Basophils Absolute 0.02 0.00 - 0.10 Thousands/µL   Comprehensive metabolic panel    Collection Time: 06/18/24 12:05 PM   Result Value Ref Range    Sodium 136 135 - 147 mmol/L    Potassium 4.1 3.5 - 5.3 mmol/L    Chloride 108 96 - 108 mmol/L    CO2 23 21 - 32 mmol/L    ANION GAP 5 4 - 13 mmol/L    BUN 17 5 - 25 mg/dL    Creatinine 1.02 0.60 - 1.30 mg/dL    Glucose 90 65 - 140 mg/dL    Calcium 8.7 8.4 - 10.2 mg/dL    AST 25 13 - 39 U/L    ALT 22 7 - 52 U/L    Alkaline Phosphatase 133 (H) 34 - 104 U/L    Total Protein 7.1 6.4 - 8.4 g/dL    Albumin 4.4 3.5 - 5.0 g/dL    Total Bilirubin 0.74 0.20 - 1.00 mg/dL    eGFR 63 ml/min/1.73sq m   HS Troponin 0hr (reflex protocol)    Collection Time: 06/18/24 12:05 PM   Result Value Ref Range    hs TnI 0hr 8 \"Refer to ACS Flowchart\"- see link ng/L   ECG 12 lead    Collection Time: 06/18/24  2:00 PM   Result Value Ref Range    Ventricular Rate 62 BPM    Atrial Rate 93 BPM    TN Interval  ms    QRSD Interval 94 ms    QT Interval 494 ms    QTC Interval 501 ms    P [place] 62 degrees    QRS Axis 6 degrees    T Wave [place] 41 degrees   HS Troponin I 2hr    Collection Time: 06/18/24  2:01 PM   Result Value Ref Range    hs TnI 2hr 7 \"Refer to ACS Flowchart\"- see link ng/L    " Delta 2hr hsTnI -1 <20 ng/L   CBC    Collection Time: 07/10/24  7:01 AM   Result Value Ref Range    WBC 4.82 4.31 - 10.16 Thousand/uL    RBC 3.82 (L) 3.88 - 5.62 Million/uL    Hemoglobin 12.5 12.0 - 17.0 g/dL    Hematocrit 37.8 36.5 - 49.3 %    MCV 99 (H) 82 - 98 fL    MCH 32.7 26.8 - 34.3 pg    MCHC 33.1 31.4 - 37.4 g/dL    RDW 13.2 11.6 - 15.1 %    Platelets 157 149 - 390 Thousands/uL    MPV 11.1 8.9 - 12.7 fL   Comprehensive metabolic panel    Collection Time: 07/10/24  7:01 AM   Result Value Ref Range    Sodium 140 135 - 147 mmol/L    Potassium 4.6 3.5 - 5.3 mmol/L    Chloride 107 96 - 108 mmol/L    CO2 26 21 - 32 mmol/L    ANION GAP 7 4 - 13 mmol/L    BUN 15 5 - 25 mg/dL    Creatinine 1.08 0.60 - 1.30 mg/dL    Glucose, Fasting 101 (H) 65 - 99 mg/dL    Calcium 8.3 (L) 8.4 - 10.2 mg/dL    AST 22 13 - 39 U/L    ALT 19 7 - 52 U/L    Alkaline Phosphatase 123 (H) 34 - 104 U/L    Total Protein 6.5 6.4 - 8.4 g/dL    Albumin 4.2 3.5 - 5.0 g/dL    Total Bilirubin 0.87 0.20 - 1.00 mg/dL    eGFR 59 ml/min/1.73sq m   Lipid panel    Collection Time: 07/10/24  7:01 AM   Result Value Ref Range    Cholesterol 77 See Comment mg/dL    Triglycerides 122 See Comment mg/dL    HDL, Direct 28 (L) >=40 mg/dL    LDL Calculated 25 0 - 100 mg/dL    Non-HDL-Chol (CHOL-HDL) 49 mg/dl            Physical Exam  Vitals and nursing note reviewed.   Constitutional:       General: He is not in acute distress.     Appearance: He is well-developed. He is not diaphoretic.   HENT:      Head: Normocephalic and atraumatic.   Eyes:      General: No scleral icterus.        Right eye: No discharge.         Left eye: No discharge.      Conjunctiva/sclera: Conjunctivae normal.      Pupils: Pupils are equal, round, and reactive to light.   Neck:      Thyroid: No thyromegaly.      Vascular: No JVD.   Cardiovascular:      Rate and Rhythm: Normal rate. Rhythm irregular.      Heart sounds: Normal heart sounds. No murmur heard.     No friction rub. No gallop.    Pulmonary:      Effort: Pulmonary effort is normal. No respiratory distress.      Breath sounds: Normal breath sounds. No wheezing or rales.   Chest:      Chest wall: No tenderness.   Abdominal:      General: Bowel sounds are normal. There is no distension.      Palpations: Abdomen is soft. There is no mass.      Tenderness: There is no abdominal tenderness. There is no guarding or rebound.   Musculoskeletal:         General: No tenderness or deformity. Normal range of motion.      Cervical back: Normal range of motion and neck supple.   Lymphadenopathy:      Cervical: No cervical adenopathy.   Skin:     General: Skin is warm and dry.      Coloration: Skin is not pale.      Findings: No erythema or rash.   Neurological:      Mental Status: He is alert and oriented to person, place, and time.      Cranial Nerves: No cranial nerve deficit.      Coordination: Coordination normal.      Deep Tendon Reflexes: Reflexes are normal and symmetric.   Psychiatric:         Behavior: Behavior normal.         Thought Content: Thought content normal.         Judgment: Judgment normal.

## 2024-07-17 NOTE — TELEPHONE ENCOUNTER
Called [facility] and spoke with the Nurse Juanita and was requesting a sooner appointment for this patient due to the abnormal EKG and also chest pain that he was having.  Juanita stated that she did speak with the patient on this issue and got him in today at 1:35 p.m. with the JEFF Foss.  I got the Fax number [phone] to fax over the EKG that we did in our office as well.     I told this to Dr. Iniguez as well and was pleased that they got him in right away for this issue

## 2024-07-17 NOTE — ASSESSMENT & PLAN NOTE
I recommended he contact his cardiologist which she will be doing today.  I will also be reaching out to his cardiologist .

## 2024-07-17 NOTE — ASSESSMENT & PLAN NOTE
Lab Results   Component Value Date    EGFR 59 07/10/2024    EGFR 63 06/18/2024    EGFR 51 01/31/2024    CREATININE 1.08 07/10/2024    CREATININE 1.02 06/18/2024    CREATININE 1.23 01/31/2024   Stable.  Continue to trend kidney function.

## 2024-07-26 ENCOUNTER — NURSE TRIAGE (OUTPATIENT)
Age: 89
End: 2024-07-26

## 2024-07-26 ENCOUNTER — TELEPHONE (OUTPATIENT)
Age: 89
End: 2024-07-26

## 2024-07-26 ENCOUNTER — APPOINTMENT (OUTPATIENT)
Dept: LAB | Facility: IMAGING CENTER | Age: 89
End: 2024-07-26
Payer: MEDICARE

## 2024-07-26 DIAGNOSIS — R39.9 UTI SYMPTOMS: Primary | ICD-10-CM

## 2024-07-26 DIAGNOSIS — K21.9 GASTROESOPHAGEAL REFLUX DISEASE WITHOUT ESOPHAGITIS: ICD-10-CM

## 2024-07-26 DIAGNOSIS — R39.9 UTI SYMPTOMS: ICD-10-CM

## 2024-07-26 PROCEDURE — 87086 URINE CULTURE/COLONY COUNT: CPT

## 2024-07-26 PROCEDURE — 81001 URINALYSIS AUTO W/SCOPE: CPT

## 2024-07-26 PROCEDURE — 87077 CULTURE AEROBIC IDENTIFY: CPT

## 2024-07-26 PROCEDURE — 87186 SC STD MICRODIL/AGAR DIL: CPT

## 2024-07-26 RX ORDER — FAMOTIDINE 20 MG/1
20 TABLET, FILM COATED ORAL 2 TIMES DAILY
Qty: 30 TABLET | Refills: 0 | Status: SHIPPED | OUTPATIENT
Start: 2024-07-26

## 2024-07-26 RX ORDER — SULFAMETHOXAZOLE AND TRIMETHOPRIM 800; 160 MG/1; MG/1
1 TABLET ORAL EVERY 12 HOURS SCHEDULED
Qty: 14 TABLET | Refills: 0 | Status: SHIPPED | OUTPATIENT
Start: 2024-07-26 | End: 2024-08-02

## 2024-07-26 NOTE — TELEPHONE ENCOUNTER
Owen calling to confirm prescription info for pt. Attempted to warm transfer to office but no one was available to take call.     Per Chloe's verbal message, advised pharmacist that medication increase is only temporary until pt can be seen by his regular physician who is currently out of office.

## 2024-07-26 NOTE — TELEPHONE ENCOUNTER
Patient following up on appointment from last week. He started the pepcid for acid reflux after dinner and states he is getting no relief. Would like to know if there is a different medication he can take.

## 2024-07-26 NOTE — TELEPHONE ENCOUNTER
Patient called in d/t burning w/ urination. Patient had UTI back in April and states he feels same as he did then. Denies hematuria and fever. Ordered urine testing, reviewed ED precautions.         Answer Questions - Initial Assessment  When did your symptoms start: a couple weeks    Is burning with every void: yes    Do you have any other urinary symptoms, urinary frequency, urgency, incontinence: no    Does your urine stream spray (Specifically for males): no    Have you become unable to urinate: No: I do not feel as though I am retaining urine    Have you seen blood in your urine: no    Do you have any abdominal pain or flank pain: no    Do you have a fever of 101 or higher: no    Do you have a history of urinary tract infections: Yes: @LASTLAB(MG)@    Have you had any recent urine testing: no    Have you ever been tested for an STD: no    Have you had a recent urologic procedure or surgery: no    Protocols used: Dysuria

## 2024-07-27 LAB
BACTERIA UR QL AUTO: ABNORMAL /HPF
BILIRUB UR QL STRIP: NEGATIVE
CLARITY UR: ABNORMAL
COLOR UR: YELLOW
GLUCOSE UR STRIP-MCNC: NEGATIVE MG/DL
HGB UR QL STRIP.AUTO: ABNORMAL
KETONES UR STRIP-MCNC: NEGATIVE MG/DL
LEUKOCYTE ESTERASE UR QL STRIP: ABNORMAL
NITRITE UR QL STRIP: NEGATIVE
NON-SQ EPI CELLS URNS QL MICRO: ABNORMAL /HPF
PH UR STRIP.AUTO: 6 [PH]
PROT UR STRIP-MCNC: ABNORMAL MG/DL
RBC #/AREA URNS AUTO: ABNORMAL /HPF
SP GR UR STRIP.AUTO: 1.01 (ref 1–1.03)
UROBILINOGEN UR STRIP-ACNC: <2 MG/DL
WBC #/AREA URNS AUTO: ABNORMAL /HPF

## 2024-07-28 LAB — BACTERIA UR CULT: ABNORMAL

## 2024-07-30 ENCOUNTER — TELEPHONE (OUTPATIENT)
Dept: UROLOGY | Facility: AMBULATORY SURGERY CENTER | Age: 89
End: 2024-07-30

## 2024-07-30 NOTE — TELEPHONE ENCOUNTER
Spoke with pt to share that Bactrim is an appropriate antibiotic for his positive culture. Pt has been taking Bactrim since appt the other day and will continue until course is complete. Pt appreciated the call.

## 2024-07-30 NOTE — TELEPHONE ENCOUNTER
----- Message from Kunal Valencia PA-C sent at 7/29/2024  9:53 AM EDT -----  Bactrim appropriate.  Patient should complete course

## 2024-08-07 ENCOUNTER — TELEPHONE (OUTPATIENT)
Age: 89
End: 2024-08-07

## 2024-08-07 NOTE — TELEPHONE ENCOUNTER
Patient called to request refill on famotidine. Advised it was sent on the 26th. Patient will contact the pharmacy

## 2024-08-26 DIAGNOSIS — K21.9 GASTROESOPHAGEAL REFLUX DISEASE WITHOUT ESOPHAGITIS: ICD-10-CM

## 2024-08-26 RX ORDER — FAMOTIDINE 20 MG/1
20 TABLET, FILM COATED ORAL 2 TIMES DAILY
Qty: 60 TABLET | Refills: 5 | Status: SHIPPED | OUTPATIENT
Start: 2024-08-26

## 2024-08-26 NOTE — TELEPHONE ENCOUNTER
Reason for call:   [x] Refill   [] Prior Auth  [] Other:     Office:   [x] PCP/Provider - Jose Iniguez MD  [] Specialty/Provider -     Medication: famotidine (PEPCID) 20 mg tablet     Dose/Frequency:     20 mg, Oral, 2 times daily       Quantity: 60    Pharmacy: [facility] - [place], [place] - [address]     Does the patient have enough for 3 days?   [] Yes   [x] No - Send as HP to POD

## 2024-09-14 ENCOUNTER — APPOINTMENT (EMERGENCY)
Dept: RADIOLOGY | Facility: HOSPITAL | Age: 89
End: 2024-09-14
Payer: MEDICARE

## 2024-09-14 ENCOUNTER — HOSPITAL ENCOUNTER (EMERGENCY)
Facility: HOSPITAL | Age: 89
Discharge: HOME | End: 2024-09-14
Attending: EMERGENCY MEDICINE
Payer: MEDICARE

## 2024-09-14 ENCOUNTER — APPOINTMENT (EMERGENCY)
Dept: NON INVASIVE DIAGNOSTICS | Facility: HOSPITAL | Age: 89
End: 2024-09-14
Payer: MEDICARE

## 2024-09-14 VITALS
HEART RATE: 57 BPM | TEMPERATURE: 97.3 F | WEIGHT: 170.19 LBS | BODY MASS INDEX: 24.37 KG/M2 | HEIGHT: 70 IN | DIASTOLIC BLOOD PRESSURE: 80 MMHG | SYSTOLIC BLOOD PRESSURE: 159 MMHG | OXYGEN SATURATION: 97 % | RESPIRATION RATE: 18 BRPM

## 2024-09-14 DIAGNOSIS — M25.561 ACUTE PAIN OF RIGHT KNEE: Primary | ICD-10-CM

## 2024-09-14 PROCEDURE — 99284 EMERGENCY DEPT VISIT MOD MDM: CPT

## 2024-09-14 PROCEDURE — 93971 EXTREMITY STUDY: CPT

## 2024-09-14 PROCEDURE — 93971 EXTREMITY STUDY: CPT | Performed by: SURGERY

## 2024-09-14 PROCEDURE — 73564 X-RAY EXAM KNEE 4 OR MORE: CPT

## 2024-09-14 PROCEDURE — 99284 EMERGENCY DEPT VISIT MOD MDM: CPT | Performed by: EMERGENCY MEDICINE

## 2024-09-14 NOTE — ED PROVIDER NOTES
1. Acute pain of right knee      ED Disposition       ED Disposition   Discharge    Condition   Stable    Date/Time   Sat Sep 14, 2024 12:08 PM    Comment   Jameel Benton discharge to home/self care.                   Assessment & Plan       Medical Decision Making  Amount and/or Complexity of Data Reviewed  Radiology: ordered and independent interpretation performed.      There is no evidence of a DVT or Baker's cyst.  His knee x-ray shows some arthritis but I do not see any fracture or dislocation.  Have patient follow-up with orthopedics.  Most likely then muscular strain if having posterior knee tenderness at this point.  I do not believe it is a tendinitis.            ED Course as of 09/14/24 1611   Sat Sep 14, 2024   1204 Per vascular tech. Negative for DVT and no baker's cyst reported.       Medications - No data to display    History of Present Illness       HPI    Chief Complaint   Patient presents with    Knee Pain     Pt reports R-knee pain for a couple of days. Denies any injuries.      Past Medical History:   Diagnosis Date    Anemia, iron deficiency     Drug Therapy, poor absorbtion documented    Appendicitis     Arthritis 01/25/2019    Cardiac disease     Colon cancer (HCC) 12/2013    Resected: BREANNA    Congenital nystagmus     Left    Gastroesophageal reflux disease without esophagitis 06/19/2020    Hyperlipidemia     Hypertension     Inguinal hernia     Sciatica 2010    resolved from problem list-2011    Stage 3 chronic kidney disease, unspecified whether stage 3a or 3b CKD (HCC) 05/03/2023     Past Surgical History:   Procedure Laterality Date    APPENDECTOMY      Appendicitis    CHOLECYSTECTOMY      COLON SIGMOID RESECTION  12/2013    COLON SURGERY      CORONARY ARTERY BYPASS GRAFT      x 2    CORONARY ARTERY BYPASS GRAFT      x2    CORONARY ARTERY BYPASS GRAFT      CORONARY ARTERY BYPASS GRAFT      INGUINAL HERNIA REPAIR      SHOULDER ARTHROCENTESIS Right     sub-acromial bursa area     Prior to  Admission medications    Medication Sig Start Date End Date Taking? Authorizing Provider   amLODIPine (NORVASC) 2.5 mg tablet TAKE 1 TABLET BY MOUTH DAILY @ 9A (HTN) 3/21/24   Jose Iniguez MD   aspirin 81 mg chewable tablet Chew 81 mg    Historical Provider, MD   atorvastatin (LIPITOR) 20 mg tablet Take 1 tablet (20 mg total) by mouth daily 6/3/24   Jose Iniguez MD   clopidogrel (PLAVIX) 75 mg tablet Take 75 mg by mouth daily 2/23/24 2/22/25  Historical Provider, MD   famotidine (PEPCID) 20 mg tablet Take 1 tablet (20 mg total) by mouth 2 (two) times a day 8/26/24   Jose Iniguez MD   finasteride (PROSCAR) 5 mg tablet Take 1 tablet (5 mg total) by mouth daily 3/27/24 3/22/25  JEFF Temple   folic acid (FOLVITE) 800 MCG tablet Take 400 mcg by mouth daily    Historical Provider, MD   lisinopril (ZESTRIL) 10 mg tablet Take 1 tablet (10 mg total) by mouth daily 6/21/24   Jose Iniguez MD   metoprolol tartrate (LOPRESSOR) 100 mg tablet Take 1 tablet (100 mg total) by mouth every 12 (twelve) hours 5/1/24   Jose Iniguez MD   Multiple Vitamins-Minerals (MULTIVITAL-M PO) Take 1 tablet by mouth    Historical Provider, MD   omeprazole (PriLOSEC) 20 mg delayed release capsule Take 20 mg by mouth daily in the early morning    Historical Provider, MD   tamsulosin (FLOMAX) 0.4 mg Take 1 capsule (0.4 mg total) by mouth daily at bedtime 3/27/24 3/22/25  JEFF Temple   Posterior right knee pain for the last couple of days.  No obvious injury.  No swelling.  He normally walks with a cane.  He can still ambulate but he has pain worse with movement.  No history of PE or DVT.  No history of Baker's cyst.  No prior knee issues.      Review of Systems        Objective     ED Triage Vitals [09/14/24 1027]   Temperature Pulse Blood Pressure Respirations SpO2 Patient Position - Orthostatic VS   (!) 97.3 °F (36.3 °C) 57 159/80 18 97 % Sitting      Temp Source Heart Rate Source BP Location FiO2 (%) Pain Score    Oral  Monitor Right arm -- 8        Physical Exam  Vitals and nursing note reviewed.   Constitutional:       General: He is not in acute distress.     Appearance: Normal appearance. He is not ill-appearing, toxic-appearing or diaphoretic.   Musculoskeletal:      Right lower leg: No edema.      Left lower leg: No edema.      Comments: Right knee externally appears normal.  There is no laxity.  He has flexion and extension.  He feels pain posteriorly.  Not feel a mass.  He is not tender over the hamstring cords.  Has normal pulses.  Normal sensation.   Skin:     General: Skin is warm.      Findings: No bruising or erythema.   Neurological:      Mental Status: He is alert.         Labs Reviewed - No data to display  XR knee 4+ views Right injury   ED Interpretation by Deny Brito MD (09/14 0091)   I have reviewed the film, per my independent interpretation : no acute fracture or dislocation. Formal read per radiology.        Final Interpretation by Bradley Landon Kocher, MD (09/14 1969)      No acute osseous abnormality.      Heterotopic ossification versus degenerative loose body in the posterolateral joint space.         Computerized Assisted Algorithm (CAA) may have been used to analyze all applicable images.         Resident: Bebeto Mckenna I, the attending radiologist, have reviewed the images and agree with the final report above.      Workstation performed: [id number]         VAS lower limb venous duplex study, unilateral/limited    (Results Pending)       Procedures       Deny Brito MD  09/14/24 7440

## 2024-09-16 ENCOUNTER — VBI (OUTPATIENT)
Dept: ADMINISTRATIVE | Facility: OTHER | Age: 89
End: 2024-09-16

## 2024-09-16 NOTE — TELEPHONE ENCOUNTER
09/16/24 11:40 AM    Patient contacted post ED visit, first outreach attempt made. Message was left for patient to return a call to the VBI Department at [place]: Phone [phone].    Thank you.  Beryl Lopez MA  [facility]     CHIEF COMPLAINT:  Elementary school well-child check.    HISTORY OF PRESENT ILLNESS:  Alexa is a 8 year old female who presents for a well-child exam.  Patient presents with Mother.       Concerns.   None    Nutrition.   Eating all foods well.  Good variety.      Elimination.   Urinating and stooling fine.  No trouble with constipation.    Sleep.   Sleeping well at night.    Temperament/Behavior.    No behavior issues  What methods of discipline work best for her? privilege removal    Developmental Screening (PEDS-DM):   Developmental progress reviewed with caregiver today.    SOCIAL:  Primary caretakers:  Mother and father.  Siblings:  no  School:  Doing well in grade 2. No social, performance, or other school issues raised today.  Activities/sports:  Swim team  Smoking:  No exposure:    Patient Active Problem List   Diagnosis   • Food allergy   • Allergic rhinitis   • Keratosis pilaris   • Urticaria       Outpatient Prescriptions Marked as Taking for the 3/28/17 encounter (Office Visit) with Sergei Escalante MD   Medication Sig Dispense Refill   • prednisoLONE sod-phos (PRELONE) 15 MG/5ML oral solution Take 1/2 tsp twice daily for 5 days 25 mL 0   • cetirizine (ZYRTEC CHILDRENS ALLERGY) 5 MG/5ML syrup Take 1 tsp twice daily for 1-2 weeks then 1.5 tsp daily thereafter. 235 mL 4   • triamcinolone (ARISTOCORT) 0.1 % ointment Apply sparingly bid prn 30 g 2   • montelukast (SINGULAIR) 4 MG chewable tablet Chew 1 tablet by mouth nightly. 90 tablet 1   • EPINEPHrine (EPIPEN 2-RYLEE) 0.3 MG/0.3ML Solution Auto-injector Inject 0.3 mLs into the muscle once as needed for Anaphylaxis. 2 each 2   • diphenhydrAMINE (BENADRYL) 12.5 MG/5ML liquid Take  by mouth as needed.         ALLERGIES:   Allergen Reactions   • Amoxicillin Other (See Comments)   • Penicillin G HIVES       PHYSICAL EXAM:  Blood pressure 98/60, pulse 100, resp. rate 20, height 4' 1.5\" (1.257 m), weight 29.5 kg. Body mass index is 18.65 kg/(m^2).  77 %ile (Z= 0.74)  based on CDC 2-20 Years weight-for-age data using vitals from 3/28/2017.  37 %ile (Z= -0.34) based on CDC 2-20 Years stature-for-age data using vitals from 3/28/2017.   Visual Acuity Screening    Right eye Left eye Both eyes   Without correction: 20/70 20/70 20/70   With correction:      Comments: Patient has glasses at home used for distance,       GENERAL:  The patient is an awake, alert, and well-appearing female. No acute distress. Nontoxic. Alert and interactive.   HEAD:  Normocephalic, atraumatic.   EYES:  Pupils reactive to light. Conjunctivae without injection or icterus. EOMI (Extraocular movements are intact).  EARS:  TMs (Tympanic membranes) transparent with good landmarks. No effusion or inflammation.  NARES:  Patent. No discharge.   THROAT:  Oropharynx with moist mucous membranes. No erythema or exudate or oral lesions.  NECK:  Supple, no lymphadenopathy or masses.  HEART:  Regular rate and rhythm. Normal S1 and S2. No murmurs, rubs, or gallops. Femoral pulses palpable and symmetric.  LUNGS:  Clear to auscultation bilaterally. No wheezes, rales, or rhonchi. Normal work of breathing.  ABDOMEN:  Soft, nondistended, nontender. Bowel sounds normoactive. No organomegaly or masses.  GENITOURINARY:  No hernia present  EXTREMITIES:  Warm, dry, without abnormalities.  NEUROLOGIC:  Normal tone, bulk, or strength. Deep tendon reflexes are 2+ throughout.  SKIN: No rashes or lesions or cyanosis.    ASSESSMENT AND PLAN:  Alexa is an otherwise healthy 8  Yrs 0  Mos female here for well-child check.  1. Growth:  Reviewed growth parameters. Will continue to monitor. We discussed continuing with a variety of fruits and vegetables, good calcium and iron intake.  2. Vaccinations:  Up to date.   3. Developmental Screening:  Developmental milestones reviewed. We discussed expectations for further growth and development.  4. All parental concerns and questions discussed. Anticipatory guidance provided.  Discussed safety in the  car/on a bicycle/as well as with fire, sharp objects, falls, and water. Also, reviewed development, appropriate discipline, diet, television, and sun exposure. Handout given.  5. Will follow up at next annual well-child check or sooner as needed should other issues arise.

## 2024-09-16 NOTE — LETTER
VALUE BASED VIR  [address] [facility] [address]    Date: 09/18/24    Jameel Benton  [address]  [address]  [facility] [place] [facility] [address]    Dear Jameel:                                                                                                                                Thank you for choosing [facility] emergency department for care.  Your primary care provider wants to make sure that your ongoing medical care is being addressed. If you require follow up care as a result of your emergency department visit, there are a few things the practice would like you to know.                As part of the network's continuing commitment to caring for our patients, we have added more same day appointments and have extended office hours to meet your medical needs. After hours, on-call physicians are available via your primary care provider's main office line.               We encourage you to contact our office prior to seeking treatment to discuss your symptoms with the medical staff.  Together, we can determine the correct course of action.  A majority of non-emergent conditions such as: common cold, flu-like symptoms, fevers, strains/sprains, dislocations, minor burns, cuts and animal bites can be treated at [facility] facilities. Diagnostic testing is available at some sites.               Of course, if you are experiencing a life threatening medical emergency call 911 or proceed directly to the nearest emergency room.    Your nearest [facility] facility is conveniently located at:    [address] [address]  [phone]  SKIP THE WAIT  Conveniently offered at most [facility] locations  [place] your spot online at [email or web address]/[email or web address] or on the [facility]    Sincerely,    VALUE BASED VIR  No information on file.

## 2024-09-17 NOTE — TELEPHONE ENCOUNTER
09/17/24 12:50 PM    Patient contacted post ED visit, second outreach attempt made. Message was left for patient to return a call to the VBI Department at [place]: Phone [phone].    Thank you.  Beryl Lopez MA  [facility]

## 2024-09-18 NOTE — TELEPHONE ENCOUNTER
09/18/24 12:49 PM    Patient contacted post ED visit, third outreach attempt made. Message was left for patient to return a call to either the VBI Department at [place]: Phone [phone] or the PCP office.     Thank you.  Beryl Lopez MA  [facility]    09/18/24 12:50 PM    Patient contacted post ED visit, phone outreaches were unsuccessful and a MyChart letter has been sent to the patient as follow-up.    Thank you.  Beryl Lopez MA  [facility]

## 2024-09-30 DIAGNOSIS — I10 ESSENTIAL HYPERTENSION: ICD-10-CM

## 2024-09-30 NOTE — TELEPHONE ENCOUNTER
Reason for call:   [x] Refill   [] Prior Auth  [] Other:     Office:   [x] PCP/Provider -Jose Iniguez MD   [] Specialty/Provider -     Medication: AmLODIPine (NORVASC) 2.5 mg tablet / TAKE 1 TABLET BY MOUTH DAILY @ 9A (HTN)     Pharmacy: [facility] - [place], [place] - [address]     Does the patient have enough for 3 days?   [x] Yes   [] No - Send as HP to POD

## 2024-09-30 NOTE — TELEPHONE ENCOUNTER
Tunde from [facility] call to check on a fax for in home physical therapy. Please be on the lookout. Thank you

## 2024-10-01 RX ORDER — AMLODIPINE BESYLATE 2.5 MG/1
TABLET ORAL
Qty: 90 TABLET | Refills: 1 | Status: SHIPPED | OUTPATIENT
Start: 2024-10-01

## 2024-10-28 DIAGNOSIS — I10 ESSENTIAL HYPERTENSION: ICD-10-CM

## 2024-10-28 RX ORDER — METOPROLOL TARTRATE 100 MG/1
100 TABLET ORAL EVERY 12 HOURS SCHEDULED
Qty: 180 TABLET | Refills: 1 | Status: SHIPPED | OUTPATIENT
Start: 2024-10-28

## 2024-10-28 NOTE — TELEPHONE ENCOUNTER
Reason for call:   [x] Refill   [] Prior Auth  [] Other:     Office:   [x] PCP/Provider - Jose Iniguez MD   [] Specialty/Provider -     Medication:     metoprolol tartrate (LOPRESSOR) 100 mg tablet       Dose/Frequency:     Take 1 tablet (100 mg total) by mouth every 12 (twelve) hours       Quantity: 180    Pharmacy: [address] [phone]     Does the patient have enough for 3 days?   [] Yes   [x] No - Send as HP to POD

## 2024-11-22 DIAGNOSIS — I10 ESSENTIAL HYPERTENSION: ICD-10-CM

## 2024-11-22 RX ORDER — LISINOPRIL 10 MG/1
TABLET ORAL
Qty: 90 TABLET | Refills: 1 | Status: SHIPPED | OUTPATIENT
Start: 2024-11-22

## 2024-12-02 DIAGNOSIS — E78.5 DYSLIPIDEMIA: ICD-10-CM

## 2024-12-02 NOTE — TELEPHONE ENCOUNTER
Reason for call:   [x] Refill   [] Prior Auth  [] Other:     Office:   [x] PCP/Provider -   [] Specialty/Provider -     Medication:   Atorvastatin 20mg- take 1 tablet by mouth daily      Pharmacy: [facility]    Does the patient have enough for 3 days?   [x] Yes   [] No - Send as HP to POD

## 2024-12-03 RX ORDER — ATORVASTATIN CALCIUM 20 MG/1
20 TABLET, FILM COATED ORAL DAILY
Qty: 30 TABLET | Refills: 5 | Status: SHIPPED | OUTPATIENT
Start: 2024-12-03

## 2024-12-18 DIAGNOSIS — I10 ESSENTIAL HYPERTENSION: ICD-10-CM

## 2024-12-18 RX ORDER — LISINOPRIL 10 MG/1
10 TABLET ORAL DAILY
Qty: 30 TABLET | Refills: 5 | Status: SHIPPED | OUTPATIENT
Start: 2024-12-18

## 2024-12-18 NOTE — TELEPHONE ENCOUNTER
Reason for call:   [x] Refill   [] Prior Auth  [] Other:     Office:   [x] PCP/Provider -  [facility]  Authorized By: Jose Iniguez MD  [] Specialty/Provider -     Medication:     lisinopril (ZESTRIL) 10 mg tablet        Pharmacy: [facility] - [place], [place] - [address]      Does the patient have enough for 3 days?   [] Yes   [x] No - Send as HP to POD

## 2025-01-22 ENCOUNTER — OFFICE VISIT (OUTPATIENT)
Dept: INTERNAL MEDICINE CLINIC | Facility: OTHER | Age: OVER 89
End: 2025-01-22
Payer: MEDICARE

## 2025-01-22 VITALS
BODY MASS INDEX: 24.48 KG/M2 | DIASTOLIC BLOOD PRESSURE: 78 MMHG | HEART RATE: 76 BPM | WEIGHT: 171 LBS | OXYGEN SATURATION: 100 % | RESPIRATION RATE: 18 BRPM | SYSTOLIC BLOOD PRESSURE: 134 MMHG | TEMPERATURE: 98.4 F | HEIGHT: 70 IN

## 2025-01-22 DIAGNOSIS — Z13.1 SCREENING FOR DIABETES MELLITUS: ICD-10-CM

## 2025-01-22 DIAGNOSIS — N13.8 BPH WITH OBSTRUCTION/LOWER URINARY TRACT SYMPTOMS: ICD-10-CM

## 2025-01-22 DIAGNOSIS — I25.10 CORONARY ARTERY DISEASE INVOLVING NATIVE CORONARY ARTERY OF NATIVE HEART WITHOUT ANGINA PECTORIS: ICD-10-CM

## 2025-01-22 DIAGNOSIS — N40.1 BPH WITH OBSTRUCTION/LOWER URINARY TRACT SYMPTOMS: ICD-10-CM

## 2025-01-22 DIAGNOSIS — E78.5 DYSLIPIDEMIA: ICD-10-CM

## 2025-01-22 DIAGNOSIS — N18.30 STAGE 3 CHRONIC KIDNEY DISEASE, UNSPECIFIED WHETHER STAGE 3A OR 3B CKD (HCC): ICD-10-CM

## 2025-01-22 DIAGNOSIS — I48.0 PAROXYSMAL ATRIAL FIBRILLATION (HCC): ICD-10-CM

## 2025-01-22 DIAGNOSIS — Z00.00 MEDICARE ANNUAL WELLNESS VISIT, SUBSEQUENT: ICD-10-CM

## 2025-01-22 DIAGNOSIS — Z71.89 ADVANCED CARE PLANNING/COUNSELING DISCUSSION: ICD-10-CM

## 2025-01-22 DIAGNOSIS — Z13.6 SCREENING FOR CARDIOVASCULAR CONDITION: ICD-10-CM

## 2025-01-22 DIAGNOSIS — I10 ESSENTIAL HYPERTENSION: Primary | ICD-10-CM

## 2025-01-22 DIAGNOSIS — K21.9 GASTROESOPHAGEAL REFLUX DISEASE WITHOUT ESOPHAGITIS: Chronic | ICD-10-CM

## 2025-01-22 DIAGNOSIS — I73.9 PERIPHERAL ARTERIAL DISEASE (HCC): ICD-10-CM

## 2025-01-22 PROBLEM — R07.9 CHEST PAIN: Status: RESOLVED | Noted: 2024-07-17 | Resolved: 2025-01-22

## 2025-01-22 PROCEDURE — 99214 OFFICE O/P EST MOD 30 MIN: CPT | Performed by: INTERNAL MEDICINE

## 2025-01-22 PROCEDURE — G2211 COMPLEX E/M VISIT ADD ON: HCPCS | Performed by: INTERNAL MEDICINE

## 2025-01-22 PROCEDURE — 99497 ADVNCD CARE PLAN 30 MIN: CPT | Performed by: INTERNAL MEDICINE

## 2025-01-22 PROCEDURE — G0439 PPPS, SUBSEQ VISIT: HCPCS | Performed by: INTERNAL MEDICINE

## 2025-01-22 RX ORDER — HYDROCORTISONE 25 MG/G
OINTMENT TOPICAL 2 TIMES DAILY
COMMUNITY
Start: 2025-01-21

## 2025-01-22 NOTE — ASSESSMENT & PLAN NOTE
Continue atorvastatin.  Orders:    Ambulatory Referral to Cardiology; Future    CBC; Future    Comprehensive metabolic panel; Future    Lipid panel; Future

## 2025-01-22 NOTE — PATIENT INSTRUCTIONS
Medicare Preventive Visit Patient Instructions  Thank you for completing your Welcome to Medicare Visit or Medicare Annual Wellness Visit today. Your next wellness visit will be due in one year (1/23/2026).  The screening/preventive services that you may require over the next 5-10 years are detailed below. Some tests may not apply to you based off risk factors and/or age. Screening tests ordered at today's visit but not completed yet may show as past due. Also, please note that scanned in results may not display below.  Preventive Screenings:  Service Recommendations Previous Testing/Comments   Colorectal Cancer Screening  Colonoscopy    Fecal Occult Blood Test (FOBT)/Fecal Immunochemical Test (FIT)  Fecal DNA/Cologuard Test  Flexible Sigmoidoscopy Age: 45-75 years old   Colonoscopy: every 10 years (May be performed more frequently if at higher risk)  OR  FOBT/FIT: every 1 year  OR  Cologuard: every 3 years  OR  Sigmoidoscopy: every 5 years  Screening may be recommended earlier than age 45 if at higher risk for colorectal cancer. Also, an individualized decision between you and your healthcare provider will decide whether screening between the ages of 76-85 would be appropriate. Colonoscopy: Not on file  FOBT/FIT: Not on file  Cologuard: Not on file  Sigmoidoscopy: Not on file    Screening Not Indicated  History Colorectal Cancer     Prostate Cancer Screening Individualized decision between patient and health care provider in men between ages of 55-69   Medicare will cover every 12 months beginning on the day after your 50th birthday PSA: No results in last 5 years     Screening Not Indicated     Hepatitis C Screening Once for adults born between 1945 and 1965  More frequently in patients at high risk for Hepatitis C Hep C Antibody: Not on file        Diabetes Screening 1-2 times per year if you're at risk for diabetes or have pre-diabetes Fasting glucose: 101 mg/dL (7/10/2024)  A1C: No results in last 5 years (No  results in last 5 years)  Screening Current   Cholesterol Screening Once every 5 years if you don't have a lipid disorder. May order more often based on risk factors. Lipid panel: 07/10/2024  Screening Not Indicated  History Lipid Disorder      Other Preventive Screenings Covered by Medicare:  Abdominal Aortic Aneurysm (AAA) Screening: covered once if your at risk. You're considered to be at risk if you have a family history of AAA or a male between the age of 65-75 who smoking at least 100 cigarettes in your lifetime.  Lung Cancer Screening: covers low dose CT scan once per year if you meet all of the following conditions: (1) Age 55-77; (2) No signs or symptoms of lung cancer; (3) Current smoker or have quit smoking within the last 15 years; (4) You have a tobacco smoking history of at least 20 pack years (packs per day x number of years you smoked); (5) You get a written order from a healthcare provider.  Glaucoma Screening: covered annually if you're considered high risk: (1) You have diabetes OR (2) Family history of glaucoma OR (3)  aged 50 and older OR (4)  American aged 65 and older  Osteoporosis Screening: covered every 2 years if you meet one of the following conditions: (1) Have a vertebral abnormality; (2) On glucocorticoid therapy for more than 3 months; (3) Have primary hyperparathyroidism; (4) On osteoporosis medications and need to assess response to drug therapy.  HIV Screening: covered annually if you're between the age of 15-65. Also covered annually if you are younger than 15 and older than 65 with risk factors for HIV infection. For pregnant patients, it is covered up to 3 times per pregnancy.    Immunizations:  Immunization Recommendations   Influenza Vaccine Annual influenza vaccination during flu season is recommended for all persons aged >= 6 months who do not have contraindications   Pneumococcal Vaccine   * Pneumococcal conjugate vaccine = PCV13 (Prevnar 13), PCV15  (Vaxneuvance), PCV20 (Prevnar 20)  * Pneumococcal polysaccharide vaccine = PPSV23 (Pneumovax) Adults 19-63 yo with certain risk factors or if 65+ yo  If never received any pneumonia vaccine: recommend Prevnar 20 (PCV20)  Give PCV20 if previously received 1 dose of PCV13 or PPSV23   Hepatitis B Vaccine 3 dose series if at intermediate or high risk (ex: diabetes, end stage renal disease, liver disease)   Respiratory syncytial virus (RSV) Vaccine - COVERED BY MEDICARE PART D  * RSVPreF3 (Arexvy) CDC recommends that adults 60 years of age and older may receive a single dose of RSV vaccine using shared clinical decision-making (SCDM)   Tetanus (Td) Vaccine - COST NOT COVERED BY MEDICARE PART B Following completion of primary series, a booster dose should be given every 10 years to maintain immunity against tetanus. Td may also be given as tetanus wound prophylaxis.   Tdap Vaccine - COST NOT COVERED BY MEDICARE PART B Recommended at least once for all adults. For pregnant patients, recommended with each pregnancy.   Shingles Vaccine (Shingrix) - COST NOT COVERED BY MEDICARE PART B  2 shot series recommended in those 19 years and older who have or will have weakened immune systems or those 50 years and older     Health Maintenance Due:  There are no preventive care reminders to display for this patient.  Immunizations Due:      Topic Date Due   • Influenza Vaccine (1) 09/01/2024     Advance Directives   What are advance directives?  Advance directives are legal documents that state your wishes and plans for medical care. These plans are made ahead of time in case you lose your ability to make decisions for yourself. Advance directives can apply to any medical decision, such as the treatments you want, and if you want to donate organs.   What are the types of advance directives?  There are many types of advance directives, and each state has rules about how to use them. You may choose a combination of any of the  following:  Living will:  This is a written record of the treatment you want. You can also choose which treatments you do not want, which to limit, and which to stop at a certain time. This includes surgery, medicine, IV fluid, and tube feedings.   Durable power of  for healthcare (DPAHC):  This is a written record that states who you want to make healthcare choices for you when you are unable to make them for yourself. This person, called a proxy, is usually a family member or a friend. You may choose more than 1 proxy.  Do not resuscitate (DNR) order:  A DNR order is used in case your heart stops beating or you stop breathing. It is a request not to have certain forms of treatment, such as CPR. A DNR order may be included in other types of advance directives.  Medical directive:  This covers the care that you want if you are in a coma, near death, or unable to make decisions for yourself. You can list the treatments you want for each condition. Treatment may include pain medicine, surgery, blood transfusions, dialysis, IV or tube feedings, and a ventilator (breathing machine).  Values history:  This document has questions about your views, beliefs, and how you feel and think about life. This information can help others choose the care that you would choose.  Why are advance directives important?  An advance directive helps you control your care. Although spoken wishes may be used, it is better to have your wishes written down. Spoken wishes can be misunderstood, or not followed. Treatments may be given even if you do not want them. An advance directive may make it easier for your family to make difficult choices about your care.       © Copyright [employer] 2018 Information is for End User's use only and may not be sold, redistributed or otherwise used for commercial purposes. All illustrations and images included in CareNotes® are the copyrighted property of A.D.A.M., Inc. or [address]

## 2025-01-22 NOTE — ASSESSMENT & PLAN NOTE
Currently in sinus rhythm. Continue metoprolol.   Orders:    Ambulatory Referral to Cardiology; Future    CBC; Future    Comprehensive metabolic panel; Future    Lipid panel; Future

## 2025-01-22 NOTE — ASSESSMENT & PLAN NOTE
Jameel Benton and I had a thorough discussion regarding advance care planning.  he  has a Living Will at home to which I recommended he provide a copy to be scanned for his medical records.  ACP documentation provided to patient today.  he  understands that today's visit is a billable service.  Refer to ACP note.    Serious Illness Conversation    1. What is your understanding now of where you are with your illness?  Prognostic Understanding: appropriate understanding of prognosis     2. How much information about what is likely to be ahead with your illness would you like to have?  Information: patient wants to be fully informed     3. What did you (clinician) communicate to the patient?     4. If your health situation worsens, what are your most important goals?  Goals: have my medical decisions respected, be mentally aware, be at home, be physically comfortable, be emotionally at peace, not be a burden, be spiritually and emotionally at peace     5. What are the biggest fears and worries about the future and your health?  Fears/Worries: loss of control, being a financial burden, being dependent, burdening others, being a physical burden     6. What abilities are so critical to your life that you cannot imagine living without them?  Unacceptable Function: being unconscious     7. What gives you strength as you think about the future with your illness?     8. If you become sicker, how much are you willing to go through for the possibility of gaining more time?  Be in the hospital: Yes Have a feeding tube: Yes   Be in the ICU: Yes Live in a nursing home: Yes   Be on a ventilator: Yes Be uncomfortable: Yes   Be on dialysis: Yes Undergo aggressive test and/or procedures: Yes   9. How much does your proxy and family know about your priorities and wishes?  Discussion Discussion: extensive discussion with family about goals and wishes        How does this plan sound to you? I will do everything I can to help you through  this.  Patient verbalized understanding of the plan     I have spent 16 minutes speaking with my patient on advanced care planning today or during this visit     Advanced directives  Five Wishes: Patient does not have Five Wishes- would like information

## 2025-01-22 NOTE — ASSESSMENT & PLAN NOTE
Controlled.  Continue current antihypertensive regimen.  Orders:    CBC; Future    Comprehensive metabolic panel; Future    Lipid panel; Future

## 2025-01-22 NOTE — ASSESSMENT & PLAN NOTE
Continue atorvastatin.   Orders:    CBC; Future    Comprehensive metabolic panel; Future    Lipid panel; Future

## 2025-01-22 NOTE — PROGRESS NOTES
Name: Jameel Benton      : 1932      MRN: [id number]  Encounter Provider: Jose Iniguez MD  Encounter Date: 2025   Encounter department: [facility]  Medicare annual wellness visit, subsequent         Screening for diabetes mellitus         Screening for cardiovascular condition         Essential hypertension  Controlled.  Continue current antihypertensive regimen.  Orders:    CBC; Future    Comprehensive metabolic panel; Future    Lipid panel; Future    Coronary artery disease involving native coronary artery of native heart without angina pectoris  Continue atorvastatin.  Orders:    Ambulatory Referral to Cardiology; Future    CBC; Future    Comprehensive metabolic panel; Future    Lipid panel; Future    Paroxysmal atrial fibrillation (HCC)  Currently in sinus rhythm. Continue metoprolol.   Orders:    Ambulatory Referral to Cardiology; Future    CBC; Future    Comprehensive metabolic panel; Future    Lipid panel; Future    Gastroesophageal reflux disease without esophagitis  Controlled, continue omeprazole.       Stage 3 chronic kidney disease, unspecified whether stage 3a or 3b CKD (HCC)  Lab Results   Component Value Date    EGFR 59 07/10/2024    EGFR 63 2024    EGFR 51 2024    CREATININE 1.08 07/10/2024    CREATININE 1.02 2024    CREATININE 1.23 2024   Continue to trend kidney function.        BPH with obstruction/lower urinary tract symptoms  Controlled with Flomax and finasteride.       Peripheral arterial disease (HCC)         Dyslipidemia  Continue atorvastatin.   Orders:    CBC; Future    Comprehensive metabolic panel; Future    Lipid panel; Future    Advanced care planning/counseling discussion  Jameel Benton and I had a thorough discussion regarding advance care planning.  he  has a Living Will at home to which I recommended he provide a copy to be scanned for his medical records.  ACP documentation  provided to patient today.  he  understands that today's visit is a billable service.  Refer to ACP note.    Serious Illness Conversation    1. What is your understanding now of where you are with your illness?  Prognostic Understanding: appropriate understanding of prognosis     2. How much information about what is likely to be ahead with your illness would you like to have?  Information: patient wants to be fully informed     3. What did you (clinician) communicate to the patient?     4. If your health situation worsens, what are your most important goals?  Goals: have my medical decisions respected, be mentally aware, be at home, be physically comfortable, be emotionally at peace, not be a burden, be spiritually and emotionally at peace     5. What are the biggest fears and worries about the future and your health?  Fears/Worries: loss of control, being a financial burden, being dependent, burdening others, being a physical burden     6. What abilities are so critical to your life that you cannot imagine living without them?  Unacceptable Function: being unconscious     7. What gives you strength as you think about the future with your illness?     8. If you become sicker, how much are you willing to go through for the possibility of gaining more time?  Be in the hospital: Yes Have a feeding tube: Yes   Be in the ICU: Yes Live in a nursing home: Yes   Be on a ventilator: Yes Be uncomfortable: Yes   Be on dialysis: Yes Undergo aggressive test and/or procedures: Yes   9. How much does your proxy and family know about your priorities and wishes?  Discussion Discussion: extensive discussion with family about goals and wishes        How does this plan sound to you? I will do everything I can to help you through this.  Patient verbalized understanding of the plan     I have spent 16 minutes speaking with my patient on advanced care planning today or during this visit     Advanced directives  Five Wishes: Patient does  not have Five Wishes- would like information                 Preventive health issues were discussed with patient, and age appropriate screening tests were ordered as noted in patient's After Visit Summary. Personalized health advice and appropriate referrals for health education or preventive services given if needed, as noted in patient's After Visit Summary.    History of Present Illness     Jameel Benton is seen today for follow up of chronic conditions.   Recent laboratory studies reviewed today with the patient.   he has been compliant with his medication regimen.     he has no complaints or concerns at this time.       Hyperlipidemia  This is a chronic problem. The current episode started more than 1 year ago. The problem is controlled. Recent lipid tests were reviewed and are normal. Pertinent negatives include no chest pain or shortness of breath. Current antihyperlipidemic treatment includes statins. The current treatment provides moderate improvement of lipids. There are no compliance problems.    Hypertension  This is a chronic problem. The current episode started more than 1 year ago. The problem is unchanged. The problem is controlled. Pertinent negatives include no chest pain, headaches, palpitations or shortness of breath. Past treatments include calcium channel blockers, beta blockers and ACE inhibitors. The current treatment provides moderate improvement. There are no compliance problems.    Heartburn  He reports no abdominal pain, no chest pain, no choking, no coughing, no nausea, no sore throat or no wheezing. This is a chronic problem. The current episode started more than 1 year ago. The problem occurs rarely. Pertinent negatives include no fatigue. He has tried a PPI for the symptoms. The treatment provided moderate relief.      Patient Care Team:  Jose Iniguez MD as PCP - General (Internal Medicine)    Review of Systems   Constitutional:  Negative for activity change, appetite change,  chills, diaphoresis, fatigue and fever.   HENT:  Negative for congestion, postnasal drip, rhinorrhea, sinus pressure, sinus pain, sneezing and sore throat.    Eyes:  Negative for visual disturbance.   Respiratory:  Negative for apnea, cough, choking, chest tightness, shortness of breath and wheezing.    Cardiovascular:  Negative for chest pain, palpitations and leg swelling.   Gastrointestinal:  Negative for abdominal distention, abdominal pain, anal bleeding, blood in stool, constipation, diarrhea, nausea and vomiting.   Endocrine: Negative for cold intolerance and heat intolerance.   Genitourinary:  Negative for difficulty urinating, dysuria and hematuria.   Musculoskeletal: Negative.    Skin: Negative.    Neurological:  Negative for dizziness, weakness, light-headedness, numbness and headaches.   Hematological:  Negative for adenopathy.   Psychiatric/Behavioral:  Negative for agitation, sleep disturbance and suicidal ideas.    All other systems reviewed and are negative.    Medical History Reviewed by provider this encounter:  Tobacco  Allergies  Meds  Problems  Med Hx  Surg Hx  Fam Hx       Annual Wellness Visit Questionnaire   Jameel is here for his Subsequent Wellness visit. Last Medicare Wellness visit information reviewed, patient interviewed and updates made to the record today.      Health Risk Assessment:   Patient rates overall health as good. Patient feels that their physical health rating is slightly worse. Patient is satisfied with their life. Eyesight was rated as slightly worse. Hearing was rated as same. Patient feels that their emotional and mental health rating is same. Patients states they are never, rarely angry. Patient states they are sometimes unusually tired/fatigued. Pain experienced in the last 7 days has been some. Patient's pain rating has been 4/10. Patient states that he has experienced no weight loss or gain in last 6 months.     Depression Screening:   PHQ-2 Score: 0      Fall  Risk Screening:   In the past year, patient has experienced: no history of falling in past year      Home Safety:  Patient does not have trouble with stairs inside or outside of their home. Patient has working smoke alarms and has working carbon monoxide detector. Home safety hazards include: none.     Nutrition:   Current diet is Regular.     Medications:   Patient is currently taking over-the-counter supplements. OTC medications include: see medication list. Patient is able to manage medications.     Activities of Daily Living (ADLs)/Instrumental Activities of Daily Living (IADLs):   Walk and transfer into and out of bed and chair?: Yes  Dress and groom yourself?: Yes    Bathe or shower yourself?: Yes    Feed yourself? Yes  Do your laundry/housekeeping?: Yes  Manage your money, pay your bills and track your expenses?: Yes  Make your own meals?: No    Do your own shopping?: Yes    Previous Hospitalizations:   Any hospitalizations or ED visits within the last 12 months?: Yes    How many hospitalizations have you had in the last year?: 1-2    Advance Care Planning:   Living will: Yes    Durable POA for healthcare: Yes    Advanced directive: Yes    Advanced directive counseling given: Yes    End of Life Decisions reviewed with patient: Yes    Provider agrees with end of life decisions: Yes      Cognitive Screening:   Provider or family/friend/caregiver concerned regarding cognition?: No    PREVENTIVE SCREENINGS      Cardiovascular Screening:    General: Screening Not Indicated, History Lipid Disorder, Risks and Benefits Discussed and Screening Current    Due for: Lipid Panel      Diabetes Screening:     General: Screening Current and Risks and Benefits Discussed    Due for: Blood Glucose      Colorectal Cancer Screening:     General: Screening Not Indicated, History Colorectal Cancer and Risks and Benefits Discussed      Prostate Cancer Screening:    General: Screening Not Indicated      Osteoporosis Screening:     General: Screening Not Indicated      Abdominal Aortic Aneurysm (AAA) Screening:        General: Screening Not Indicated      Lung Cancer Screening:     General: Screening Not Indicated      Hepatitis C Screening:    General: Risks and Benefits Discussed and Screening Current    Screening, Brief Intervention, and Referral to Treatment (SBIRT)    Screening  Typical number of drinks in a day: 0  Typical number of drinks in a week: 0  Interpretation: Low risk drinking behavior.    Single Item Drug Screening:  How often have you used an illegal drug (including marijuana) or a prescription medication for non-medical reasons in the past year? never    Single Item Drug Screen Score: 0  Interpretation: Negative screen for possible drug use disorder    Brief Intervention  Alcohol & drug use screenings were reviewed. No concerns regarding substance use disorder identified.     Other Counseling Topics:   Car/seat belt/driving safety, skin self-exam, sunscreen and regular weightbearing exercise.     Social Drivers of Health     Financial Resource Strain: Low Risk  (1/10/2024)    Overall Financial Resource Strain (CARDIA)     Difficulty of Paying Living Expenses: Not hard at all   Food Insecurity: No Food Insecurity (1/22/2025)    Hunger Vital Sign     Worried About Running Out of Food in the Last Year: Never true     Ran Out of Food in the Last Year: Never true   Transportation Needs: No Transportation Needs (1/22/2025)    PRAPARE - Transportation     Lack of Transportation (Medical): No     Lack of Transportation (Non-Medical): No   Housing Stability: Low Risk  (1/22/2025)    Housing Stability Vital Sign     Unable to Pay for Housing in the Last Year: No     Number of Times Moved in the Last Year: 1     Homeless in the Last Year: No   Utilities: Not At Risk (1/22/2025)    [facility] Utilities     Threatened with loss of utilities: No     No results found.    Objective   /78 (BP Location: Left arm, Patient Position: Sitting,  "Cuff Size: Standard)   Pulse 76   Temp 98.4 °F (36.9 °C) (Temporal)   Resp 18   Ht 5' 10\" (1.778 m)   Wt 77.6 kg (171 lb)   SpO2 100%   BMI 24.54 kg/m²     Physical Exam  Vitals and nursing note reviewed.   Constitutional:       General: He is not in acute distress.     Appearance: Normal appearance. He is normal weight. He is not ill-appearing, toxic-appearing or diaphoretic.   HENT:      Head: Normocephalic and atraumatic.      Right Ear: Tympanic membrane, ear canal and external ear normal. There is no impacted cerumen.      Left Ear: Tympanic membrane, ear canal and external ear normal. There is no impacted cerumen.      Nose: Nose normal. No congestion or rhinorrhea.      Mouth/Throat:      Mouth: Mucous membranes are moist.      Pharynx: Oropharynx is clear. No oropharyngeal exudate or posterior oropharyngeal erythema.   Eyes:      General: No scleral icterus.        Right eye: No discharge.         Left eye: No discharge.      Extraocular Movements: Extraocular movements intact.      Conjunctiva/sclera: Conjunctivae normal.      Pupils: Pupils are equal, round, and reactive to light.   Neck:      Vascular: No carotid bruit.   Cardiovascular:      Rate and Rhythm: Normal rate and regular rhythm.      Pulses: Normal pulses.      Heart sounds: Normal heart sounds. No murmur heard.     No friction rub. No gallop.   Pulmonary:      Effort: Pulmonary effort is normal. No respiratory distress.      Breath sounds: Normal breath sounds. No wheezing or rales.   Abdominal:      General: Abdomen is flat. Bowel sounds are normal. There is no distension.      Palpations: Abdomen is soft. There is no mass.      Tenderness: There is no abdominal tenderness. There is no guarding.      Hernia: No hernia is present.   Musculoskeletal:         General: No swelling, tenderness, deformity or signs of injury. Normal range of motion.      Cervical back: Normal range of motion and neck supple. No rigidity. No muscular " tenderness.      Right lower leg: No edema.      Left lower leg: No edema.   Lymphadenopathy:      Cervical: No cervical adenopathy.   Skin:     General: Skin is warm and dry.      Capillary Refill: Capillary refill takes less than 2 seconds.      Coloration: Skin is not jaundiced or pale.      Findings: No bruising, erythema, lesion or rash.   Neurological:      General: No focal deficit present.      Mental Status: He is alert and oriented to person, place, and time. Mental status is at baseline.      Cranial Nerves: No cranial nerve deficit.      Sensory: No sensory deficit.      Motor: No weakness.      Coordination: Coordination normal.      Gait: Gait normal.      Deep Tendon Reflexes: Reflexes normal.   Psychiatric:         Mood and Affect: Mood normal.         Behavior: Behavior normal.         Thought Content: Thought content normal.         Judgment: Judgment normal.

## 2025-01-22 NOTE — ASSESSMENT & PLAN NOTE
Lab Results   Component Value Date    EGFR 59 07/10/2024    EGFR 63 06/18/2024    EGFR 51 01/31/2024    CREATININE 1.08 07/10/2024    CREATININE 1.02 06/18/2024    CREATININE 1.23 01/31/2024   Continue to trend kidney function.

## 2025-02-17 DIAGNOSIS — I25.10 CORONARY ARTERY DISEASE INVOLVING NATIVE CORONARY ARTERY OF NATIVE HEART, UNSPECIFIED WHETHER ANGINA PRESENT: Primary | ICD-10-CM

## 2025-02-17 RX ORDER — CLOPIDOGREL BISULFATE 75 MG/1
75 TABLET ORAL DAILY
Qty: 30 TABLET | Refills: 5 | Status: CANCELLED | OUTPATIENT
Start: 2025-02-17 | End: 2026-02-17

## 2025-02-17 NOTE — TELEPHONE ENCOUNTER
Reason for call:   [] Refill   [] Prior Auth  [x] Other: Pt called for refill on his plavix, explained to pt that he gets this from his cardio at [facility] and must call them for refill, pt aware

## 2025-02-17 NOTE — TELEPHONE ENCOUNTER
Reason for call:   [x] Refill   [] Prior Auth  [] Other:     Office:   [x] PCP/Provider -   [] Specialty/Provider -     Medication:     clopidogrel (PLAVIX) 75 mg tablet 75 mg, Daily       Pharmacy: [facility] - [place], [place] - [address]      Does the patient have enough for 3 days?   [] Yes   [x] No - Send as HP to POD

## 2025-02-20 NOTE — TELEPHONE ENCOUNTER
Patient called to check the status on his refill for Plavix. Patient made aware that he needs to contact his cardiologist at [facility] for the refill. Patient verbalized understanding and will contact his cardiologist.

## 2025-03-11 ENCOUNTER — OFFICE VISIT (OUTPATIENT)
Dept: INTERNAL MEDICINE CLINIC | Facility: OTHER | Age: OVER 89
End: 2025-03-11
Payer: MEDICARE

## 2025-03-11 VITALS
TEMPERATURE: 98.3 F | WEIGHT: 166.6 LBS | OXYGEN SATURATION: 94 % | HEART RATE: 57 BPM | SYSTOLIC BLOOD PRESSURE: 124 MMHG | RESPIRATION RATE: 18 BRPM | HEIGHT: 70 IN | DIASTOLIC BLOOD PRESSURE: 64 MMHG | BODY MASS INDEX: 23.85 KG/M2

## 2025-03-11 DIAGNOSIS — K21.9 GASTROESOPHAGEAL REFLUX DISEASE WITHOUT ESOPHAGITIS: Primary | Chronic | ICD-10-CM

## 2025-03-11 PROCEDURE — G2211 COMPLEX E/M VISIT ADD ON: HCPCS | Performed by: INTERNAL MEDICINE

## 2025-03-11 PROCEDURE — 99213 OFFICE O/P EST LOW 20 MIN: CPT | Performed by: INTERNAL MEDICINE

## 2025-03-11 NOTE — ASSESSMENT & PLAN NOTE
Recommended he increase Prilosec/omeprazole from 20 mg daily to BID for the remainder of this month, then back to daily (AM) thereafter. He is to contact our office for worsening symptoms.

## 2025-03-11 NOTE — PROGRESS NOTES
Name: Jameel Benton      : 1932      MRN: [id number]  Encounter Provider: Jose Iniguez MD  Encounter Date: 3/11/2025   Encounter department: [facility]  :  Assessment & Plan  Gastroesophageal reflux disease without esophagitis  Recommended he increase Prilosec/omeprazole from 20 mg daily to BID for the remainder of this month, then back to daily (AM) thereafter. He is to contact our office for worsening symptoms.               History of Present Illness   Jameel Benton is seen today with concern for indigestion/acid reflux.   He has been experiencing 2-weeks, ever since he had a bout of viral gastroenteritis in February, of indigestion/acid reflux most evenings followed by loose BM in the evening. He has been taking immodium when he has the loose stools. He has no symptoms in the morning or evening. He takes Prilosec in the morning.     Heartburn  He complains of heartburn. He reports no abdominal pain, no chest pain, no coughing, no nausea, no sore throat or no wheezing. Pertinent negatives include no fatigue.     Review of Systems   Constitutional: Negative.  Negative for chills, fatigue and fever.   HENT:  Negative for congestion, ear pain, postnasal drip, rhinorrhea and sore throat.    Eyes: Negative.    Respiratory:  Negative for cough, chest tightness, shortness of breath and wheezing.    Cardiovascular:  Negative for chest pain and palpitations.   Gastrointestinal:  Positive for heartburn. Negative for abdominal distention, abdominal pain, blood in stool, constipation, diarrhea and nausea.   Endocrine: Negative.    Genitourinary:  Negative for difficulty urinating, dysuria and hematuria.   Musculoskeletal: Negative.    Skin: Negative.    Allergic/Immunologic: Negative for environmental allergies and food allergies.   Neurological: Negative.    Hematological:  Negative for adenopathy.   Psychiatric/Behavioral:  Negative for agitation, behavioral problems,  "confusion and sleep disturbance.    All other systems reviewed and are negative.      Objective   /64 (BP Location: Left arm, Patient Position: Sitting, Cuff Size: Standard)   Pulse 57   Temp 98.3 °F (36.8 °C) (Temporal)   Resp 18   Ht 5' 10\" (1.778 m)   Wt 75.6 kg (166 lb 9.6 oz)   SpO2 94%   BMI 23.90 kg/m²      Physical Exam  Vitals and nursing note reviewed.   Constitutional:       General: He is not in acute distress.     Appearance: He is well-developed. He is not diaphoretic.   HENT:      Head: Normocephalic and atraumatic.   Eyes:      General: No scleral icterus.        Right eye: No discharge.         Left eye: No discharge.      Conjunctiva/sclera: Conjunctivae normal.      Pupils: Pupils are equal, round, and reactive to light.   Neck:      Thyroid: No thyromegaly.      Vascular: No JVD.   Cardiovascular:      Rate and Rhythm: Normal rate and regular rhythm.      Heart sounds: Normal heart sounds. No murmur heard.     No friction rub. No gallop.   Pulmonary:      Effort: Pulmonary effort is normal. No respiratory distress.      Breath sounds: Normal breath sounds. No wheezing or rales.   Chest:      Chest wall: No tenderness.   Abdominal:      General: Bowel sounds are normal. There is no distension.      Palpations: Abdomen is soft. There is no mass.      Tenderness: There is no abdominal tenderness. There is no guarding or rebound.   Musculoskeletal:         General: No tenderness or deformity. Normal range of motion.      Cervical back: Normal range of motion and neck supple.   Lymphadenopathy:      Cervical: No cervical adenopathy.   Skin:     General: Skin is warm and dry.      Coloration: Skin is not pale.      Findings: No erythema or rash.   Neurological:      Mental Status: He is alert and oriented to person, place, and time.      Cranial Nerves: No cranial nerve deficit.      Coordination: Coordination normal.      Deep Tendon Reflexes: Reflexes are normal and symmetric. "   Psychiatric:         Behavior: Behavior normal.         Thought Content: Thought content normal.         Judgment: Judgment normal.

## 2025-03-19 ENCOUNTER — TELEPHONE (OUTPATIENT)
Age: OVER 89
End: 2025-03-19

## 2025-03-19 ENCOUNTER — OFFICE VISIT (OUTPATIENT)
Dept: INTERNAL MEDICINE CLINIC | Facility: OTHER | Age: OVER 89
End: 2025-03-19
Payer: MEDICARE

## 2025-03-19 VITALS
OXYGEN SATURATION: 97 % | WEIGHT: 167 LBS | BODY MASS INDEX: 23.91 KG/M2 | SYSTOLIC BLOOD PRESSURE: 120 MMHG | DIASTOLIC BLOOD PRESSURE: 70 MMHG | HEART RATE: 63 BPM | TEMPERATURE: 98 F | HEIGHT: 70 IN

## 2025-03-19 DIAGNOSIS — J10.1 INFLUENZA A: ICD-10-CM

## 2025-03-19 DIAGNOSIS — J06.9 VIRAL UPPER RESPIRATORY TRACT INFECTION: Primary | ICD-10-CM

## 2025-03-19 LAB
SARS-COV-2 AG UPPER RESP QL IA: NEGATIVE
SL AMB POCT RAPID FLU A: POSITIVE
SL AMB POCT RAPID FLU B: NEGATIVE
VALID CONTROL: NORMAL

## 2025-03-19 PROCEDURE — G2211 COMPLEX E/M VISIT ADD ON: HCPCS | Performed by: INTERNAL MEDICINE

## 2025-03-19 PROCEDURE — 87811 SARS-COV-2 COVID19 W/OPTIC: CPT | Performed by: INTERNAL MEDICINE

## 2025-03-19 PROCEDURE — 99213 OFFICE O/P EST LOW 20 MIN: CPT | Performed by: INTERNAL MEDICINE

## 2025-03-19 PROCEDURE — 87804 INFLUENZA ASSAY W/OPTIC: CPT | Performed by: INTERNAL MEDICINE

## 2025-03-19 RX ORDER — OSELTAMIVIR PHOSPHATE 75 MG/1
75 CAPSULE ORAL EVERY 12 HOURS SCHEDULED
Qty: 10 CAPSULE | Refills: 0 | Status: SHIPPED | OUTPATIENT
Start: 2025-03-19 | End: 2025-03-24

## 2025-03-19 RX ORDER — OXYMETAZOLINE HCL 0.05 %
AEROSOL, MIST NASAL
Qty: 355 ML | Refills: 0 | Status: SHIPPED | OUTPATIENT
Start: 2025-03-19

## 2025-03-19 NOTE — PATIENT INSTRUCTIONS
I have ordered a medication, Tamiflu. Please take twice daily for 5 days.  I have ordered Coricidin cough syrup, use as directed.  You have a follow up scheduled in July.

## 2025-03-19 NOTE — TELEPHONE ENCOUNTER
Appointment made today to be re evaluated. Patient's appointment on 03/11/2025, symptoms of chest tightness and cough were not discussed

## 2025-03-19 NOTE — TELEPHONE ENCOUNTER
Patient was seen on 3/11/25 he is having head cold, cough and tightness in the chest. He is looking if Dr. Iniguez can prescribe some medication for him or if he needs to be seen. Please advise him accordingly.  Tried reaching out to the office.  Thank you!!

## 2025-03-19 NOTE — PROGRESS NOTES
Name: Jameel Benton      : 1932      MRN: [id number]  Encounter Provider: Sánchez Bautista MD  Encounter Date: 3/19/2025   Encounter department: [facility]  :  Assessment & Plan  Viral upper respiratory tract infection  -Patient presenting with 1 day history of upper respiratory symptoms.  -Primary symptoms are cough and rhinorrhea.  -Denies sinus symptoms, fever, chills.  -Using tylenol.    Plan:  -Tested positive for Influenza A in the office today.  -Will prescribe Tamiflu 75 mg BID for 5 days.  -Will prescribe Coricidin cough syrup.  -Okay to continue OTC tylenol.  -Encouraged rest, good oral hydration.  -Patient has follow up in July.  -Instructed patient to seek sooner follow up if symptoms do not improve in 7-14 days.  Orders:    oseltamivir (TAMIFLU) 75 mg capsule; Take 1 capsule (75 mg total) by mouth every 12 (twelve) hours for 5 days    Acetaminophen-DM (Coricidin HBP Cold/Cough/Flu) 650-20 MG/30ML LIQD; As directed.    POCT rapid flu A and B    POCT Rapid Covid Ag    Influenza A  -See A&P above.  Orders:    oseltamivir (TAMIFLU) 75 mg capsule; Take 1 capsule (75 mg total) by mouth every 12 (twelve) hours for 5 days    Acetaminophen-DM (Coricidin HBP Cold/Cough/Flu) 650-20 MG/30ML LIQD; As directed.           History of Present Illness   Patient is a 89 y/o male with PMH including CAD, BPH, GERD, CKD who presents with upper respiratory symptoms. Per patient, his symptoms started 1 day ago and primarily include a cough and a runny nose. Denies sinus symptoms, fever, chills. He has been using tylenol for symptoms. No other concerns at this time.      Review of Systems   Constitutional:  Negative for chills and fever.   HENT:  Negative for ear pain and sore throat.    Eyes:  Negative for pain and visual disturbance.   Respiratory:  Positive for cough. Negative for shortness of breath.    Cardiovascular:  Negative for chest pain and palpitations.  "  Gastrointestinal:  Negative for abdominal pain, constipation, diarrhea, nausea and vomiting.   Endocrine: Negative for polyuria.   Genitourinary:  Negative for dysuria and hematuria.   Musculoskeletal:  Negative for arthralgias and back pain.   Skin:  Negative for color change and rash.   Neurological:  Negative for seizures and syncope.   Psychiatric/Behavioral:  The patient is not nervous/anxious.    All other systems reviewed and are negative.      Objective   /70 (BP Location: Left arm, Patient Position: Sitting, Cuff Size: Adult)   Pulse 63   Temp 98 °F (36.7 °C) (Temporal)   Ht 5' 10\" (1.778 m)   Wt 75.8 kg (167 lb)   SpO2 97%   BMI 23.96 kg/m²      Physical Exam  Vitals and nursing note reviewed.   Constitutional:       General: He is not in acute distress.     Appearance: He is well-developed.   HENT:      Head: Normocephalic and atraumatic.      Right Ear: External ear normal.      Left Ear: External ear normal.      Nose: Nose normal.      Mouth/Throat:      Mouth: Mucous membranes are moist.   Eyes:      Conjunctiva/sclera: Conjunctivae normal.   Cardiovascular:      Rate and Rhythm: Normal rate and regular rhythm.      Heart sounds: No murmur heard.  Pulmonary:      Effort: Pulmonary effort is normal. No respiratory distress.      Comments: Coughing noted.  Abdominal:      Palpations: Abdomen is soft.      Tenderness: There is no abdominal tenderness.   Musculoskeletal:         General: No swelling.      Cervical back: Neck supple.   Skin:     General: Skin is warm and dry.      Capillary Refill: Capillary refill takes less than 2 seconds.   Neurological:      General: No focal deficit present.      Mental Status: He is alert and oriented to person, place, and time.   Psychiatric:         Mood and Affect: Mood normal.         "

## 2025-04-04 DIAGNOSIS — I10 ESSENTIAL HYPERTENSION: ICD-10-CM

## 2025-04-04 RX ORDER — AMLODIPINE BESYLATE 2.5 MG/1
TABLET ORAL
Qty: 90 TABLET | Refills: 1 | Status: SHIPPED | OUTPATIENT
Start: 2025-04-04

## 2025-04-04 NOTE — TELEPHONE ENCOUNTER
Medication: amLODIPine (NORVASC) 2.5 mg tablet     Dose/Frequency: TAKE 1 TABLET BY MOUTH DAILY @ 9A (HTN),     Quantity: 90     Pharmacy: [facility]    Office:   [x] PCP/Provider -   [] Speciality/Provider -     Does the patient have enough for 3 days?   [x] Yes   [] No - Send as HP to POD

## 2025-04-07 DIAGNOSIS — I10 ESSENTIAL HYPERTENSION: ICD-10-CM

## 2025-04-07 DIAGNOSIS — R33.8 ACUTE URINARY RETENTION: ICD-10-CM

## 2025-04-07 DIAGNOSIS — N40.0 ENLARGED PROSTATE: ICD-10-CM

## 2025-04-07 RX ORDER — TAMSULOSIN HYDROCHLORIDE 0.4 MG/1
0.4 CAPSULE ORAL
Qty: 90 CAPSULE | Refills: 0 | Status: SHIPPED | OUTPATIENT
Start: 2025-04-07 | End: 2026-04-02

## 2025-04-07 RX ORDER — FINASTERIDE 5 MG/1
5 TABLET, FILM COATED ORAL DAILY
Qty: 90 TABLET | Refills: 0 | Status: SHIPPED | OUTPATIENT
Start: 2025-04-07 | End: 2026-04-02

## 2025-04-07 NOTE — TELEPHONE ENCOUNTER
Reason for call:   [x] Refill   [] Prior Auth  [] Other:     Office:   [] PCP/Provider -   [x] Specialty/Provider - [facility]  Authorized By: JEFF Temple      Medication: finasteride (PROSCAR) 5 mg tablet    Dose/Frequency: Take 1 tablet (5 mg total) by mouth daily    Quantity: 90 tablet    Pharmacy:  [facility]   Does the patient have enough for 3 days?   [] Yes   [x] No - Send as HP to POD    [facility]   Does the patient have enough for 10 days?   [] Yes   [] No - Send as HP to POD    Reason for call:   [x] Refill   [] Prior Auth  [] Other:     Office:   [] PCP/Provider -   [x] Specialty/Provider - [facility]  Authorized By: JEFF Temple      Medication:  tamsulosin (FLOMAX) 0.4 mg    Dose/Frequency: Take 1 capsule (0.4 mg total) by mouth daily at bedtime    Quantity: 90 capsule    Pharmacy: [facility]   Does the patient have enough for 3 days?   [] Yes   [x] No - Send as HP to POD    [facility]   Does the patient have enough for 10 days?   [] Yes   [] No - Send as HP to POD

## 2025-04-09 ENCOUNTER — TELEPHONE (OUTPATIENT)
Age: OVER 89
End: 2025-04-09

## 2025-04-09 NOTE — TELEPHONE ENCOUNTER
Patient called requesting refill for Proscar and Flomax. Patient made aware medication was refilled on 4/7/25 for 90 tablets with 0 refills to [facility] pharmacy. Patient instructed to contact the pharmacy to obtain refills of medication. Patient verbalized understanding.

## 2025-05-01 DIAGNOSIS — I10 ESSENTIAL HYPERTENSION: ICD-10-CM

## 2025-05-01 RX ORDER — METOPROLOL TARTRATE 100 MG/1
100 TABLET ORAL EVERY 12 HOURS SCHEDULED
Qty: 180 TABLET | Refills: 1 | Status: SHIPPED | OUTPATIENT
Start: 2025-05-01

## 2025-05-01 NOTE — TELEPHONE ENCOUNTER
Reason for call:   [x] Refill   [] Prior Auth  [] Other:     Office:   [x] PCP/Provider -   [] Specialty/Provider -     Medication: Metoprolol Tartrate    Dose/Frequency: 100 mg    Quantity: 180 tablet    Pharmacy: [facility] - [place], [place] - [address]      Local Pharmacy   Does the patient have enough for 3 days?   [] Yes   [x] No - Send as HP to POD    [facility] Pharmacy   Does the patient have enough for 10 days?   [] Yes   [] No - Send as HP to POD

## 2025-05-27 DIAGNOSIS — E78.5 DYSLIPIDEMIA: ICD-10-CM

## 2025-05-27 NOTE — TELEPHONE ENCOUNTER
Medication: atorvastatin (LIPITOR) 20 mg tablet     Dose/Frequency: Take 1 tablet (20 mg total) by mouth daily     Quantity: 30 tablet     Pharmacy: [facility] - [place], [place] - [address] [facility] [address]     Office:   [x] PCP/Provider -   [] Speciality/Provider -     Does the patient have enough for 3 days?   [] Yes   [x] No - Send as HP to POD

## 2025-05-28 RX ORDER — ATORVASTATIN CALCIUM 20 MG/1
20 TABLET, FILM COATED ORAL DAILY
Qty: 100 TABLET | Refills: 1 | Status: SHIPPED | OUTPATIENT
Start: 2025-05-28

## 2025-05-29 NOTE — TELEPHONE ENCOUNTER
Patient called checking on status of refill for atorvastatin (LIPITOR). Patient made aware medication was refilled on 5/28 for 100 tablets with 1 refill to [facility]. Patient instructed to contact the pharmacy and speak with someone directly to obtain refills of medication. Patient advised to call back for refill if their pharmacy is unable to assist them. Patient verbalized understanding.

## 2025-06-20 ENCOUNTER — TELEPHONE (OUTPATIENT)
Age: OVER 89
End: 2025-06-20

## 2025-06-23 ENCOUNTER — OFFICE VISIT (OUTPATIENT)
Dept: INTERNAL MEDICINE CLINIC | Facility: OTHER | Age: OVER 89
End: 2025-06-23
Payer: MEDICARE

## 2025-06-23 VITALS
OXYGEN SATURATION: 97 % | WEIGHT: 170 LBS | BODY MASS INDEX: 24.39 KG/M2 | HEART RATE: 66 BPM | SYSTOLIC BLOOD PRESSURE: 134 MMHG | TEMPERATURE: 97.6 F | DIASTOLIC BLOOD PRESSURE: 82 MMHG

## 2025-06-23 DIAGNOSIS — J00 COMMON COLD: ICD-10-CM

## 2025-06-23 DIAGNOSIS — I10 ESSENTIAL HYPERTENSION: ICD-10-CM

## 2025-06-23 DIAGNOSIS — R50.9 FEVER, UNSPECIFIED FEVER CAUSE: Primary | ICD-10-CM

## 2025-06-23 LAB
SARS-COV-2 AG UPPER RESP QL IA: NEGATIVE
VALID CONTROL: NORMAL

## 2025-06-23 PROCEDURE — 99213 OFFICE O/P EST LOW 20 MIN: CPT | Performed by: FAMILY MEDICINE

## 2025-06-23 PROCEDURE — 87811 SARS-COV-2 COVID19 W/OPTIC: CPT | Performed by: FAMILY MEDICINE

## 2025-06-23 RX ORDER — CETIRIZINE HYDROCHLORIDE, PSEUDOEPHEDRINE HYDROCHLORIDE 5; 120 MG/1; MG/1
1 TABLET, FILM COATED, EXTENDED RELEASE ORAL DAILY
Qty: 7 TABLET | Refills: 0 | Status: SHIPPED | OUTPATIENT
Start: 2025-06-23

## 2025-06-23 RX ORDER — LISINOPRIL 10 MG/1
10 TABLET ORAL DAILY
Qty: 30 TABLET | Refills: 5 | Status: SHIPPED | OUTPATIENT
Start: 2025-06-23

## 2025-06-23 NOTE — PROGRESS NOTES
Assessment/Plan:    1. Fever, unspecified fever cause  -     POCT Rapid Covid Ag  2. Essential hypertension  -     lisinopril (ZESTRIL) 10 mg tablet; Take 1 tablet (10 mg total) by mouth daily  3. Common cold  -     cetirizine-pseudoephedrine (ZyrTEC-D) 5-120 MG per tablet; Take 1 tablet by mouth in the morning     COVID test negative done in the office    There are no Patient Instructions on file for this visit.    Return for Next scheduled follow up.    Subjective:      Patient ID: Jameel Benton is a 89 y.o. male.    Chief Complaint   Patient presents with    Cough     Dry cough started yesterday    Sore Throat     Started Friday       HPI  Day 4 of upper respiratory issues, started with a sore throat and progressed to a fever of 100.3, and now both of those are resolved but he has nasal congestion postnasal drip and a dry cough.  Cough is not worse at night, he was told that there are other sick people in his assisted living facility but he is not sure if he has had contact with them.  Has fatigue today.  Appetite is normal.        The following portions of the patient's history were reviewed and updated as appropriate: allergies, current medications, past family history, past medical history, past social history, past surgical history and problem list.    Review of Systems      Constitutional:  Denies fever or chills   Eyes:  Denies double , blurry vision or eye pain  HENT:  see HPI  Respiratory:  Denies cough or shortness of breath or wheezing  Cardiovascular:  Denies palpitations or chest pain  GI:  Denies abdominal pain, nausea, or vomiting, no loose stools, no reflux  Integument:  Denies rash , no open areas  Neurologic:  Denies headache or focal weakness, no dizziness  : no dysuria, or hematuria        Current Medications[1]    Objective:    /82 (BP Location: Right arm, Patient Position: Sitting, Cuff Size: Standard)   Pulse 66   Temp 97.6 °F (36.4 °C) (Temporal)   Wt 77.1 kg (170 lb)   SpO2  97%   BMI 24.39 kg/m²        Physical Exam       Constitutional:  Well developed, well nourished, no acute distress, non-toxic appearance congested voice,  Eyes:  PERRL, conjunctiva normal , non icteric sclera  HENT:  Atraumatic, oropharynx moist.  No erythema or exudate in posterior pharynx, no sinus tenderness to palpation.  Neck-  supple   Respiratory:  CTA b/l, normal breath sounds, no rales, no wheezing   Cardiovascular:  RRR, no murmurs, no LE edema b/l  GI:  Soft, nondistended, normal bowel sounds x 4, nontender, no organomegaly, no mass, no rebound, no guarding   Neurologic:  no focal deficits noted   Psychiatric:  Speech and behavior appropriate , AAO x 3        Larry Camarena DO         [1]   Current Outpatient Medications   Medication Sig Dispense Refill    Acetaminophen-DM (Coricidin HBP Cold/Cough/Flu) 650-20 MG/30ML LIQD As directed. 355 mL 0    amLODIPine (NORVASC) 2.5 mg tablet TAKE 1 TABLET BY MOUTH DAILY @ 9A (HTN) 90 tablet 1    aspirin 81 mg chewable tablet Chew 81 mg      atorvastatin (LIPITOR) 20 mg tablet Take 1 tablet (20 mg total) by mouth daily 100 tablet 1    cetirizine-pseudoephedrine (ZyrTEC-D) 5-120 MG per tablet Take 1 tablet by mouth in the morning 7 tablet 0    clopidogrel (PLAVIX) 75 mg tablet Take 75 mg by mouth in the morning.      finasteride (PROSCAR) 5 mg tablet Take 1 tablet (5 mg total) by mouth daily 90 tablet 0    folic acid (FOLVITE) 800 MCG tablet Take 400 mcg by mouth in the morning.      hydrocortisone 2.5 % ointment Apply topically in the morning and in the evening.      lisinopril (ZESTRIL) 10 mg tablet Take 1 tablet (10 mg total) by mouth daily 30 tablet 5    metoprolol tartrate (LOPRESSOR) 100 mg tablet Take 1 tablet (100 mg total) by mouth every 12 (twelve) hours 180 tablet 1    Multiple Vitamins-Minerals (MULTIVITAL-M PO) Take 1 tablet by mouth      omeprazole (PriLOSEC) 20 mg delayed release capsule Take 20 mg by mouth daily in the early morning      tamsulosin  (FLOMAX) 0.4 mg Take 1 capsule (0.4 mg total) by mouth daily at bedtime 90 capsule 0     No current facility-administered medications for this visit.

## 2025-07-03 DIAGNOSIS — R33.8 ACUTE URINARY RETENTION: ICD-10-CM

## 2025-07-03 DIAGNOSIS — N40.0 ENLARGED PROSTATE: ICD-10-CM

## 2025-07-03 RX ORDER — TAMSULOSIN HYDROCHLORIDE 0.4 MG/1
0.4 CAPSULE ORAL
Qty: 90 CAPSULE | Refills: 0 | Status: SHIPPED | OUTPATIENT
Start: 2025-07-03 | End: 2026-06-28

## 2025-07-03 RX ORDER — FINASTERIDE 5 MG/1
5 TABLET, FILM COATED ORAL DAILY
Qty: 90 TABLET | Refills: 0 | Status: SHIPPED | OUTPATIENT
Start: 2025-07-03 | End: 2026-06-28

## 2025-07-03 NOTE — TELEPHONE ENCOUNTER
Requested Prescriptions     Pending Prescriptions Disp Refills    tamsulosin (FLOMAX) 0.4 mg 90 capsule 0     Sig: Take 1 capsule (0.4 mg total) by mouth daily at bedtime    finasteride (PROSCAR) 5 mg tablet 90 tablet 0     Sig: Take 1 tablet (5 mg total) by mouth daily

## 2025-07-11 ENCOUNTER — NURSE TRIAGE (OUTPATIENT)
Age: OVER 89
End: 2025-07-11

## 2025-07-11 NOTE — TELEPHONE ENCOUNTER
"REASON FOR CONVERSATION: Cough    SYMPTOMS: Patient with runny nose with clear secretion. Continued mild cough. Denies fever, shortness of breath.    OTHER HEALTH INFORMATION: treated last month with URI    PROTOCOL DISPOSITION: Home Care    CARE ADVICE PROVIDED: Advised to monitor symptoms closely at home. If any changes or symptoms worsen to call back. Offered Over the counter medications to patient to help with nasal congestion and cough. Patient understood.    PRACTICE FOLLOW-UP: scheduled appointment for Monday            Reason for Disposition   Cough with cold symptoms (e.g., runny nose, postnasal drip, throat clearing)    Answer Assessment - Initial Assessment Questions  1. ONSET: \"When did the cough begin?\"       Since last month  2. SEVERITY: \"How bad is the cough today?\"       Annoy at times.  3. SPUTUM: \"Describe the color of your sputum\" (e.g., none, dry cough; clear, white, yellow, green)      clear  4. HEMOPTYSIS: \"Are you coughing up any blood?\" If Yes, ask: \"How much?\" (e.g., flecks, streaks, tablespoons, etc.)      denies  5. DIFFICULTY BREATHING: \"Are you having difficulty breathing?\" If Yes, ask: \"How bad is it?\" (e.g., mild, moderate, severe)       denies  6. FEVER: \"Do you have a fever?\" If Yes, ask: \"What is your temperature, how was it measured, and when did it start?\"      denies  7. CARDIAC HISTORY: \"Do you have any history of heart disease?\" (e.g., heart attack, congestive heart failure)       yes  8. LUNG HISTORY: \"Do you have any history of lung disease?\"  (e.g., pulmonary embolus, asthma, emphysema)      denies  9. PE RISK FACTORS: \"Do you have a history of blood clots?\" (or: recent major surgery, recent prolonged travel, bedridden)      denies  10. OTHER SYMPTOMS: \"Do you have any other symptoms?\" (e.g., runny nose, wheezing, chest pain)        Running nose. Denies shortness of breath or chest pain    Protocols used: Cough - Acute Productive-Adult-AH    "

## 2025-07-22 ENCOUNTER — OFFICE VISIT (OUTPATIENT)
Dept: INTERNAL MEDICINE CLINIC | Facility: OTHER | Age: OVER 89
End: 2025-07-22
Payer: MEDICARE

## 2025-07-22 VITALS
RESPIRATION RATE: 18 BRPM | WEIGHT: 169 LBS | HEIGHT: 70 IN | DIASTOLIC BLOOD PRESSURE: 82 MMHG | TEMPERATURE: 98.1 F | HEART RATE: 77 BPM | BODY MASS INDEX: 24.2 KG/M2 | OXYGEN SATURATION: 97 % | SYSTOLIC BLOOD PRESSURE: 130 MMHG

## 2025-07-22 DIAGNOSIS — N40.1 BPH WITH OBSTRUCTION/LOWER URINARY TRACT SYMPTOMS: ICD-10-CM

## 2025-07-22 DIAGNOSIS — I10 ESSENTIAL HYPERTENSION: Primary | ICD-10-CM

## 2025-07-22 DIAGNOSIS — K21.9 GASTROESOPHAGEAL REFLUX DISEASE WITHOUT ESOPHAGITIS: Chronic | ICD-10-CM

## 2025-07-22 DIAGNOSIS — N13.8 BPH WITH OBSTRUCTION/LOWER URINARY TRACT SYMPTOMS: ICD-10-CM

## 2025-07-22 DIAGNOSIS — I25.10 CORONARY ARTERY DISEASE INVOLVING NATIVE CORONARY ARTERY OF NATIVE HEART WITHOUT ANGINA PECTORIS: ICD-10-CM

## 2025-07-22 DIAGNOSIS — I73.9 PERIPHERAL ARTERIAL DISEASE (HCC): ICD-10-CM

## 2025-07-22 DIAGNOSIS — E78.5 DYSLIPIDEMIA: ICD-10-CM

## 2025-07-22 DIAGNOSIS — I48.0 PAROXYSMAL ATRIAL FIBRILLATION (HCC): ICD-10-CM

## 2025-07-22 DIAGNOSIS — N18.30 STAGE 3 CHRONIC KIDNEY DISEASE, UNSPECIFIED WHETHER STAGE 3A OR 3B CKD (HCC): ICD-10-CM

## 2025-07-22 PROBLEM — J00 COMMON COLD: Status: RESOLVED | Noted: 2025-06-23 | Resolved: 2025-07-22

## 2025-07-22 PROCEDURE — 99214 OFFICE O/P EST MOD 30 MIN: CPT | Performed by: INTERNAL MEDICINE

## 2025-07-22 PROCEDURE — G2211 COMPLEX E/M VISIT ADD ON: HCPCS | Performed by: INTERNAL MEDICINE

## 2025-07-22 NOTE — PROGRESS NOTES
Name: Jameel Benton      : 1932      MRN: [id number]  Encounter Provider: Jose Iniguez MD  Encounter Date: 2025   Encounter department: [facility]  :  Assessment & Plan  Essential hypertension  Controlled.  Continue current antihypertensive regimen.       Coronary artery disease involving native coronary artery of native heart without angina pectoris  Continue atorvastatin and aspirin 81 mg.       Paroxysmal atrial fibrillation (HCC)  Currently in sinus rhythm. Continue metoprolol and aspirin.        Gastroesophageal reflux disease without esophagitis  Controlled with omeprazole 20 mg daily.        Stage 3 chronic kidney disease, unspecified whether stage 3a or 3b CKD (HCC)  Lab Results   Component Value Date    EGFR 59 07/10/2024    EGFR 63 2024    EGFR 51 2024    CREATININE 1.08 07/10/2024    CREATININE 1.02 2024    CREATININE 1.23 2024   Stable, continue to trend kidney function.        BPH with obstruction/lower urinary tract symptoms  Controlled with Flomax and finasteride.       Dyslipidemia  Continue atorvastatin.        Peripheral arterial disease (HCC)  Continue aspirin, statin, and plavix.              Depression Screening and Follow-up Plan: Patient was screened for depression during today's encounter. They screened negative with a PHQ-2 score of 0.        History of Present Illness   Jameel Benton is seen today for follow up of chronic conditions.   Recent laboratory studies reviewed today with the patient.   he has been compliant with his medication regimen.     he has no complaints or concerns at this time.       Hyperlipidemia  This is a chronic problem. The current episode started more than 1 year ago. The problem is controlled. Recent lipid tests were reviewed and are normal. Pertinent negatives include no chest pain or shortness of breath. Current antihyperlipidemic treatment includes statins. The current treatment  provides moderate improvement of lipids. There are no compliance problems.    Hypertension  This is a chronic problem. The current episode started more than 1 year ago. The problem is unchanged. The problem is controlled. Pertinent negatives include no blurred vision, chest pain, headaches, malaise/fatigue, orthopnea, palpitations, PND or shortness of breath. Past treatments include beta blockers, calcium channel blockers and ACE inhibitors. The current treatment provides moderate improvement. There are no compliance problems.      Review of Systems   Constitutional: Negative.  Negative for activity change, appetite change, chills, diaphoresis, fatigue, fever and malaise/fatigue.   HENT:  Negative for congestion, ear pain, postnasal drip, rhinorrhea, sinus pressure, sinus pain, sneezing and sore throat.    Eyes: Negative.  Negative for blurred vision and visual disturbance.   Respiratory:  Negative for apnea, cough, choking, chest tightness, shortness of breath and wheezing.    Cardiovascular:  Negative for chest pain, palpitations, orthopnea, leg swelling and PND.   Gastrointestinal:  Negative for abdominal distention, abdominal pain, anal bleeding, blood in stool, constipation, diarrhea, nausea and vomiting.   Endocrine: Negative.  Negative for cold intolerance and heat intolerance.   Genitourinary:  Negative for difficulty urinating, dysuria and hematuria.   Musculoskeletal: Negative.    Skin: Negative.    Allergic/Immunologic: Negative for environmental allergies and food allergies.   Neurological: Negative.  Negative for dizziness, weakness, light-headedness, numbness and headaches.   Hematological:  Negative for adenopathy.   Psychiatric/Behavioral:  Negative for agitation, behavioral problems, confusion, sleep disturbance and suicidal ideas.    All other systems reviewed and are negative.      Objective   /82 (BP Location: Left arm, Patient Position: Sitting, Cuff Size: Standard)   Pulse 77   Temp  "98.1 °F (36.7 °C) (Temporal)   Resp 18   Ht 5' 10\" (1.778 m)   Wt 76.7 kg (169 lb)   SpO2 97%   BMI 24.25 kg/m²      Physical Exam  Vitals and nursing note reviewed.   Constitutional:       General: He is not in acute distress.     Appearance: He is well-developed. He is not diaphoretic.   HENT:      Head: Normocephalic and atraumatic.     Eyes:      General: No scleral icterus.        Right eye: No discharge.         Left eye: No discharge.      Conjunctiva/sclera: Conjunctivae normal.      Pupils: Pupils are equal, round, and reactive to light.     Neck:      Thyroid: No thyromegaly.      Vascular: No JVD.     Cardiovascular:      Rate and Rhythm: Normal rate and regular rhythm.      Heart sounds: Normal heart sounds. No murmur heard.     No friction rub. No gallop.   Pulmonary:      Effort: Pulmonary effort is normal. No respiratory distress.      Breath sounds: Normal breath sounds. No wheezing or rales.   Chest:      Chest wall: No tenderness.   Abdominal:      General: Bowel sounds are normal. There is no distension.      Palpations: Abdomen is soft. There is no mass.      Tenderness: There is no abdominal tenderness. There is no guarding or rebound.     Musculoskeletal:         General: No tenderness or deformity. Normal range of motion.      Cervical back: Normal range of motion and neck supple.   Lymphadenopathy:      Cervical: No cervical adenopathy.     Skin:     General: Skin is warm and dry.      Coloration: Skin is not pale.      Findings: No erythema or rash.     Neurological:      Mental Status: He is alert and oriented to person, place, and time.      Cranial Nerves: No cranial nerve deficit.      Coordination: Coordination normal.      Deep Tendon Reflexes: Reflexes are normal and symmetric.     Psychiatric:         Behavior: Behavior normal.         Thought Content: Thought content normal.         Judgment: Judgment normal.         "

## 2025-07-22 NOTE — ASSESSMENT & PLAN NOTE
Lab Results   Component Value Date    EGFR 59 07/10/2024    EGFR 63 06/18/2024    EGFR 51 01/31/2024    CREATININE 1.08 07/10/2024    CREATININE 1.02 06/18/2024    CREATININE 1.23 01/31/2024   Stable, continue to trend kidney function.

## 2025-08-20 ENCOUNTER — OFFICE VISIT (OUTPATIENT)
Dept: INTERNAL MEDICINE CLINIC | Facility: OTHER | Age: OVER 89
End: 2025-08-20
Payer: MEDICARE

## 2025-08-20 VITALS
HEART RATE: 53 BPM | BODY MASS INDEX: 24.48 KG/M2 | DIASTOLIC BLOOD PRESSURE: 60 MMHG | OXYGEN SATURATION: 97 % | HEIGHT: 70 IN | SYSTOLIC BLOOD PRESSURE: 132 MMHG | WEIGHT: 171 LBS | TEMPERATURE: 97.3 F

## 2025-08-20 DIAGNOSIS — R19.7 DIARRHEA, UNSPECIFIED TYPE: Primary | ICD-10-CM

## 2025-08-20 DIAGNOSIS — K21.9 GASTROESOPHAGEAL REFLUX DISEASE WITHOUT ESOPHAGITIS: Chronic | ICD-10-CM

## 2025-08-20 DIAGNOSIS — I73.9 PERIPHERAL ARTERIAL DISEASE (HCC): ICD-10-CM

## 2025-08-20 DIAGNOSIS — N18.30 STAGE 3 CHRONIC KIDNEY DISEASE, UNSPECIFIED WHETHER STAGE 3A OR 3B CKD (HCC): ICD-10-CM

## 2025-08-20 DIAGNOSIS — I48.0 PAROXYSMAL ATRIAL FIBRILLATION (HCC): ICD-10-CM

## 2025-08-20 PROCEDURE — G2211 COMPLEX E/M VISIT ADD ON: HCPCS | Performed by: INTERNAL MEDICINE

## 2025-08-20 PROCEDURE — 99214 OFFICE O/P EST MOD 30 MIN: CPT | Performed by: INTERNAL MEDICINE
